# Patient Record
Sex: MALE | Race: OTHER | HISPANIC OR LATINO | Employment: OTHER | URBAN - METROPOLITAN AREA
[De-identification: names, ages, dates, MRNs, and addresses within clinical notes are randomized per-mention and may not be internally consistent; named-entity substitution may affect disease eponyms.]

---

## 2019-10-17 ENCOUNTER — APPOINTMENT (EMERGENCY)
Dept: RADIOLOGY | Facility: HOSPITAL | Age: 74
End: 2019-10-17
Payer: COMMERCIAL

## 2019-10-17 ENCOUNTER — APPOINTMENT (EMERGENCY)
Dept: MRI IMAGING | Facility: HOSPITAL | Age: 74
End: 2019-10-17
Payer: COMMERCIAL

## 2019-10-17 ENCOUNTER — APPOINTMENT (EMERGENCY)
Dept: CT IMAGING | Facility: HOSPITAL | Age: 74
End: 2019-10-17
Payer: COMMERCIAL

## 2019-10-17 ENCOUNTER — HOSPITAL ENCOUNTER (OUTPATIENT)
Facility: HOSPITAL | Age: 74
Setting detail: OBSERVATION
Discharge: HOME HEALTH | End: 2019-10-18
Attending: EMERGENCY MEDICINE | Admitting: INTERNAL MEDICINE
Payer: COMMERCIAL

## 2019-10-17 DIAGNOSIS — F32.A DEPRESSION: ICD-10-CM

## 2019-10-17 DIAGNOSIS — R41.82 ALTERED MENTAL STATUS: Primary | ICD-10-CM

## 2019-10-17 DIAGNOSIS — F19.929 DRUG INTOXICATION (HCC): ICD-10-CM

## 2019-10-17 DIAGNOSIS — F10.20 ALCOHOLISM (HCC): ICD-10-CM

## 2019-10-17 PROBLEM — G92.8 TOXIC METABOLIC ENCEPHALOPATHY: Status: ACTIVE | Noted: 2019-10-17

## 2019-10-17 LAB
ALBUMIN SERPL BCP-MCNC: 3.6 G/DL (ref 3.5–5)
ALP SERPL-CCNC: 54 U/L (ref 46–116)
ALT SERPL W P-5'-P-CCNC: 18 U/L (ref 12–78)
AMMONIA PLAS-SCNC: <10 UMOL/L (ref 11–35)
ANION GAP SERPL CALCULATED.3IONS-SCNC: 12 MMOL/L (ref 4–13)
APTT PPP: 24 SECONDS (ref 23–37)
AST SERPL W P-5'-P-CCNC: 18 U/L (ref 5–45)
BASOPHILS # BLD AUTO: 0.03 THOUSANDS/ΜL (ref 0–0.1)
BASOPHILS NFR BLD AUTO: 1 % (ref 0–1)
BILIRUB SERPL-MCNC: 0.3 MG/DL (ref 0.2–1)
BUN SERPL-MCNC: 12 MG/DL (ref 5–25)
CALCIUM SERPL-MCNC: 8.6 MG/DL (ref 8.3–10.1)
CHLORIDE SERPL-SCNC: 105 MMOL/L (ref 100–108)
CO2 SERPL-SCNC: 25 MMOL/L (ref 21–32)
CREAT SERPL-MCNC: 0.96 MG/DL (ref 0.6–1.3)
EOSINOPHIL # BLD AUTO: 0.06 THOUSAND/ΜL (ref 0–0.61)
EOSINOPHIL NFR BLD AUTO: 1 % (ref 0–6)
ERYTHROCYTE [DISTWIDTH] IN BLOOD BY AUTOMATED COUNT: 16 % (ref 11.6–15.1)
ETHANOL SERPL-MCNC: 65 MG/DL (ref 0–3)
GFR SERPL CREATININE-BSD FRML MDRD: 78 ML/MIN/1.73SQ M
GLUCOSE SERPL-MCNC: 169 MG/DL (ref 65–140)
HCT VFR BLD AUTO: 34.8 % (ref 36.5–49.3)
HGB BLD-MCNC: 11.4 G/DL (ref 12–17)
IMM GRANULOCYTES # BLD AUTO: 0.02 THOUSAND/UL (ref 0–0.2)
IMM GRANULOCYTES NFR BLD AUTO: 0 % (ref 0–2)
INR PPP: 1.11 (ref 0.84–1.19)
LIPASE SERPL-CCNC: 45 U/L (ref 73–393)
LYMPHOCYTES # BLD AUTO: 1.31 THOUSANDS/ΜL (ref 0.6–4.47)
LYMPHOCYTES NFR BLD AUTO: 25 % (ref 14–44)
MCH RBC QN AUTO: 31.2 PG (ref 26.8–34.3)
MCHC RBC AUTO-ENTMCNC: 32.8 G/DL (ref 31.4–37.4)
MCV RBC AUTO: 95 FL (ref 82–98)
MONOCYTES # BLD AUTO: 0.47 THOUSAND/ΜL (ref 0.17–1.22)
MONOCYTES NFR BLD AUTO: 9 % (ref 4–12)
NEUTROPHILS # BLD AUTO: 3.3 THOUSANDS/ΜL (ref 1.85–7.62)
NEUTS SEG NFR BLD AUTO: 64 % (ref 43–75)
NRBC BLD AUTO-RTO: 0 /100 WBCS
PLATELET # BLD AUTO: 222 THOUSANDS/UL (ref 149–390)
PMV BLD AUTO: 9.9 FL (ref 8.9–12.7)
POTASSIUM SERPL-SCNC: 3.9 MMOL/L (ref 3.5–5.3)
PROT SERPL-MCNC: 7 G/DL (ref 6.4–8.2)
PROTHROMBIN TIME: 14.3 SECONDS (ref 11.6–14.5)
RBC # BLD AUTO: 3.65 MILLION/UL (ref 3.88–5.62)
SODIUM SERPL-SCNC: 142 MMOL/L (ref 136–145)
TROPONIN I SERPL-MCNC: <0.02 NG/ML
WBC # BLD AUTO: 5.19 THOUSAND/UL (ref 4.31–10.16)

## 2019-10-17 PROCEDURE — 71045 X-RAY EXAM CHEST 1 VIEW: CPT

## 2019-10-17 PROCEDURE — 70498 CT ANGIOGRAPHY NECK: CPT

## 2019-10-17 PROCEDURE — 93005 ELECTROCARDIOGRAM TRACING: CPT

## 2019-10-17 PROCEDURE — 82140 ASSAY OF AMMONIA: CPT | Performed by: EMERGENCY MEDICINE

## 2019-10-17 PROCEDURE — 83690 ASSAY OF LIPASE: CPT | Performed by: EMERGENCY MEDICINE

## 2019-10-17 PROCEDURE — 84484 ASSAY OF TROPONIN QUANT: CPT | Performed by: EMERGENCY MEDICINE

## 2019-10-17 PROCEDURE — 99285 EMERGENCY DEPT VISIT HI MDM: CPT

## 2019-10-17 PROCEDURE — 99291 CRITICAL CARE FIRST HOUR: CPT | Performed by: EMERGENCY MEDICINE

## 2019-10-17 PROCEDURE — 99220 PR INITIAL OBSERVATION CARE/DAY 70 MINUTES: CPT | Performed by: INTERNAL MEDICINE

## 2019-10-17 PROCEDURE — 80053 COMPREHEN METABOLIC PANEL: CPT | Performed by: EMERGENCY MEDICINE

## 2019-10-17 PROCEDURE — 82948 REAGENT STRIP/BLOOD GLUCOSE: CPT

## 2019-10-17 PROCEDURE — 80320 DRUG SCREEN QUANTALCOHOLS: CPT | Performed by: EMERGENCY MEDICINE

## 2019-10-17 PROCEDURE — 85025 COMPLETE CBC W/AUTO DIFF WBC: CPT | Performed by: EMERGENCY MEDICINE

## 2019-10-17 PROCEDURE — 96375 TX/PRO/DX INJ NEW DRUG ADDON: CPT

## 2019-10-17 PROCEDURE — 85610 PROTHROMBIN TIME: CPT | Performed by: EMERGENCY MEDICINE

## 2019-10-17 PROCEDURE — 96365 THER/PROPH/DIAG IV INF INIT: CPT

## 2019-10-17 PROCEDURE — 85730 THROMBOPLASTIN TIME PARTIAL: CPT | Performed by: EMERGENCY MEDICINE

## 2019-10-17 PROCEDURE — 70496 CT ANGIOGRAPHY HEAD: CPT

## 2019-10-17 PROCEDURE — 36415 COLL VENOUS BLD VENIPUNCTURE: CPT | Performed by: EMERGENCY MEDICINE

## 2019-10-17 PROCEDURE — 80307 DRUG TEST PRSMV CHEM ANLYZR: CPT | Performed by: EMERGENCY MEDICINE

## 2019-10-17 PROCEDURE — 70551 MRI BRAIN STEM W/O DYE: CPT

## 2019-10-17 PROCEDURE — 99292 CRITICAL CARE ADDL 30 MIN: CPT | Performed by: EMERGENCY MEDICINE

## 2019-10-17 RX ORDER — ATORVASTATIN CALCIUM 20 MG/1
20 TABLET, FILM COATED ORAL DAILY
COMMUNITY
End: 2019-11-26 | Stop reason: SDUPTHER

## 2019-10-17 RX ORDER — LISINOPRIL 20 MG/1
20 TABLET ORAL DAILY
COMMUNITY
End: 2019-11-26 | Stop reason: SDUPTHER

## 2019-10-17 RX ORDER — CLOPIDOGREL BISULFATE 75 MG/1
75 TABLET ORAL DAILY
COMMUNITY
End: 2019-10-18 | Stop reason: HOSPADM

## 2019-10-17 RX ORDER — ASPIRIN 81 MG/1
81 TABLET, CHEWABLE ORAL DAILY
Status: CANCELLED | OUTPATIENT
Start: 2019-10-18

## 2019-10-17 RX ORDER — PREGABALIN 300 MG/1
300 CAPSULE ORAL 3 TIMES DAILY
COMMUNITY

## 2019-10-17 RX ORDER — GABAPENTIN 100 MG/1
100 CAPSULE ORAL 3 TIMES DAILY
COMMUNITY
End: 2019-11-26 | Stop reason: SDUPTHER

## 2019-10-17 RX ORDER — MIRTAZAPINE 15 MG/1
15 TABLET, FILM COATED ORAL
COMMUNITY
End: 2019-11-26 | Stop reason: SDUPTHER

## 2019-10-17 RX ORDER — LORAZEPAM 2 MG/ML
1 INJECTION INTRAMUSCULAR ONCE
Status: COMPLETED | OUTPATIENT
Start: 2019-10-17 | End: 2019-10-17

## 2019-10-17 RX ADMIN — THIAMINE HYDROCHLORIDE 100 MG: 100 INJECTION, SOLUTION INTRAMUSCULAR; INTRAVENOUS at 21:10

## 2019-10-17 RX ADMIN — LORAZEPAM 1 MG: 2 INJECTION INTRAMUSCULAR; INTRAVENOUS at 21:09

## 2019-10-17 RX ADMIN — IOHEXOL 85 ML: 350 INJECTION, SOLUTION INTRAVENOUS at 20:07

## 2019-10-17 NOTE — QUICK NOTE
Stroke alert activated at [facility] by Dr Jose Davidson ([phone]) at [facility] ED  Patient is a 68year old man with CAD, DM, peripheral neuropathy, HepC treated, eTOH abuse  Last known well 1800 seen by his son at home with no complaints  Patient's daughter came back from store around , and found him not speaking, on the sofa, shaking and leaning over to the side  She thought he was having a seizure  Eyes were opening and closing, both arms were shaking  He had one seizure in the 's in the setting of ETOH use  Has stopped drinking for some time, but recently started again in setting of brother dying  Told his wife his last drink was 2 weeks ago, but wife is suspicion  At baseline, is having memory deficits, and limited independence, not going far from the house  At home, cares for himself independently  Does not drive  On exam:   /77, HR 76, afebrile  No nuchal rigidity  Exam is limited due to mental status as he is not speaking and not following commands  (Wife reports he told her his brain is frozen)  He looked at her when she was on his L  Not answering orientation questions  Mostly groaning  Appears to make attempts at speech  Eyes open, full L and R gaze  Pupils R 4mm reactive, L 2mm reactive  No obvious cranial nerve deficits, but limited participation from the patient  No drift in upper extremities  Bilateral leg drift, R>L  Not participating in coordination testing  NIHSS: 8   FS  ETOH 60  CTH images reviewed: no hemorrhage  Diffuse cortical atrophy  Chronic lacunar strokes bilateral basal ganglia and L mesopontine brainstem  No hyperdense vessel signs  CTA H&N images reviewed: bilateral carotid atherosclerosis, R>L, and intracranial atherosclerosis of the carotids at the bifurcations  No large vessel occlusion  AP:   68year old man with CAD, DM, HepC, with sudden onset change in neurological status    Exam is significant for preserved level of consciousness, and intact motor and cranial nerves, but has dysfunction in language and cognition  NIHSS is 8  No significant findings on CT or CTA H&N  Differential at this point is ETOH related mental status changes, possible withdrawal seizure, versus a shower of embolic strokes hitting multiple vascular territories, without a clear localization  Will obtain MRI brain to clarify  In meantime, will give IV thiamine, folic acid, and IV fluids  If witnessed to seize, give benzodiazepines  ADDENDUM:     MRI brain reviewed: no diffusion restriction  There is a region of T2 shine through in the L subcortical white matter adjacent to the lateral ventricle  Additional history obtained from Dr Asia Levin  Patient endorsed marijuana use this evening

## 2019-10-17 NOTE — ED PROVIDER NOTES
History  Chief Complaint   Patient presents with    Altered Mental Status     pt unable to speak, last known well around 1800     Patient is a 77-year-old male  He has a history of diabetes  He has a history of alcoholism  He has had an acute MI  He has had hepatitis C which is been treated  It sounds like he has had some alcohol related encephalopathy  Family does not feel he was drinking today  He was at his baseline at about 6:30 p m  Unk Omayra Shortly thereafter they found him altered  He will not speak  Ambulance was called  Patient was not hypoglycemic  He was brought to the emergency room a as a possible stroke  History is limited due to nonverbal patient  Patient has no history of similar episodes  There was no witnessed seizure  Prior to Admission Medications   Prescriptions Last Dose Informant Patient Reported? Taking?   atorvastatin (LIPITOR) 20 mg tablet   Yes Yes   Sig: Take 20 mg by mouth daily   clopidogrel (PLAVIX) 75 mg tablet   Yes Yes   Sig: Take 75 mg by mouth daily   gabapentin (NEURONTIN) 100 mg capsule   Yes Yes   Sig: Take 100 mg by mouth 3 (three) times a day   lisinopril (ZESTRIL) 20 mg tablet   Yes Yes   Sig: Take 20 mg by mouth daily   mirtazapine (REMERON) 15 mg tablet   Yes Yes   Sig: Take 15 mg by mouth daily at bedtime   pregabalin (LYRICA) 300 MG capsule   Yes Yes   Sig: Take 300 mg by mouth 3 (three) times a day   sitaGLIPtin-metFORMIN (JANUMET)  MG per tablet   Yes Yes   Sig: Take 1 tablet by mouth 2 (two) times a day with meals      Facility-Administered Medications: None       Past Medical History:   Diagnosis Date    [facility]     Hepatitis C     Myocardial infarction ([facility] )        No past surgical history on file  No family history on file  I have reviewed and agree with the history as documented      Social History     Tobacco Use    Smoking status: Not on file   Substance Use Topics    Alcohol use: Not on file    Drug use: Not on file        Review of Systems   Unable to perform ROS: Patient nonverbal       Physical Exam  Physical Exam   Constitutional: He appears well-developed and well-nourished  HENT:   Head: Normocephalic and atraumatic  Eyes: Pupils are equal, round, and reactive to light  No scleral icterus  Neck: Normal range of motion  Neck supple  Cardiovascular: Normal rate, regular rhythm, normal heart sounds and intact distal pulses  Exam reveals no gallop and no friction rub  No murmur heard  Pulmonary/Chest: Effort normal  No stridor  No respiratory distress  He has no wheezes  He has rales  Crackles present in the bases  Abdominal: Soft  Bowel sounds are normal  He exhibits no distension  There is no tenderness  There is no guarding  Musculoskeletal: Normal range of motion  He exhibits no edema or tenderness  Neurological:   Patient is awake  There is no facial droop  Patient does not answer questions  He will follow some commands but not others  There is no weakness to the upper extremities  There does appear to be some weakness to the left lower extremity  Skin: Skin is warm and dry  Capillary refill takes less than 2 seconds  Vitals reviewed        Vital Signs  ED Triage Vitals   Temperature Pulse Respirations Blood Pressure SpO2   10/17/19 1944 10/17/19 1944 10/17/19 1944 10/1945 10/17/19 1944   97 5 °F (36 4 °C) 76 22 (!) 189/77 96 %      Temp Source Heart Rate Source Patient Position - Orthostatic VS BP Location FiO2 (%)   10/17/19 1944 10/17/19 1944 -- 10/17/19 1944 --   Oral Monitor  Right arm       Pain Score       --                  Vitals:    10/17/19 2229 10/17/19 2229 10/17/19 2234 10/17/19 2239   BP: 141/79      Pulse:  80 88 82         Visual Acuity      ED Medications  Medications   iohexol (OMNIPAQUE) 350 MG/ML injection (MULTI-DOSE) 85 mL (85 mL Intravenous Given 10/17/19 2007)   LORazepam (ATIVAN) 2 mg/mL injection 1 mg (1 mg Intravenous Given 10/17/19 2109)   thiamine (VITAMIN B1) 100 mg in sodium chloride 0 9 % 50 mL IVPB (0 mg Intravenous Stopped 10/17/19 2146)       Diagnostic Studies  Results Reviewed     Procedure Component Value Units Date/Time    Ammonia [[id number]]  (Abnormal) Collected:  10/17/19 2108    Lab Status:  Final result Specimen:  Blood from Arm, Left Updated:  10/17/19 2132     Ammonia <10 umol/L     Protime-INR [[id number]]  (Normal) Collected:  10/17/19 1954    Lab Status:  Final result Specimen:  Blood from Arm, Left Updated:  10/17/19 2023     Protime 14 3 seconds      INR 1 11    APTT [[id number]]  (Normal) Collected:  10/17/19 1954    Lab Status:  Final result Specimen:  Blood from Arm, Left Updated:  10/17/19 2023     PTT 24 seconds     Troponin I [[id number]]  (Normal) Collected:  10/17/19 1954    Lab Status:  Final result Specimen:  Blood from Arm, Left Updated:  10/17/19 2023     Troponin I <0 02 ng/mL     Ethanol [[id number]]  (Abnormal) Collected:  10/17/19 1954    Lab Status:  Final result Specimen:  Blood from Arm, Left Updated:  10/17/19 2021     Ethanol Lvl 65 mg/dL     Comprehensive metabolic panel [[id number]]  (Abnormal) Collected:  10/17/19 1954    Lab Status:  Final result Specimen:  Blood from Arm, Left Updated:  10/17/19 2019     Sodium 142 mmol/L      Potassium 3 9 mmol/L      Chloride 105 mmol/L      CO2 25 mmol/L      ANION GAP 12 mmol/L      BUN 12 mg/dL      Creatinine 0 96 mg/dL      Glucose 169 mg/dL      Calcium 8 6 mg/dL      AST 18 U/L      ALT 18 U/L      Alkaline Phosphatase 54 U/L      Total Protein 7 0 g/dL      Albumin 3 6 g/dL      Total Bilirubin 0 30 mg/dL      eGFR 78 ml/min/1 73sq m     Félix:       Damien guidelines for Chronic Kidney Disease (CKD):     Stage 1 with normal or high GFR (GFR > 90 mL/min/1 73 square meters)    Stage 2 Mild CKD (GFR = 60-89 mL/min/1 73 square meters)    Stage 3A Moderate CKD (GFR = 45-59 mL/min/1 73 square meters)    Stage 3B Moderate CKD (GFR = 30-44 mL/min/1 73 square meters)    Stage 4 Severe CKD (GFR = 15-29 mL/min/1 73 square meters)    Stage 5 End Stage CKD (GFR <15 mL/min/1 73 square meters)  Note: GFR calculation is accurate only with a steady state creatinine    Lipase [[id number]]  (Abnormal) Collected:  10/17/19 1954    Lab Status:  Final result Specimen:  Blood from Arm, Left Updated:  10/17/19 2019     Lipase 45 u/L     CBC and differential [[id number]]  (Abnormal) Collected:  10/17/19 1954    Lab Status:  Final result Specimen:  Blood from Arm, Left Updated:  10/17/19 2002     WBC 5 19 Thousand/uL      RBC 3 65 Million/uL      Hemoglobin 11 4 g/dL      Hematocrit 34 8 %      MCV 95 fL      MCH 31 2 pg      MCHC 32 8 g/dL      RDW 16 0 %      MPV 9 9 fL      Platelets 264 Thousands/uL      nRBC 0 /100 WBCs      Neutrophils Relative 64 %      Immat GRANS % 0 %      Lymphocytes Relative 25 %      Monocytes Relative 9 %      Eosinophils Relative 1 %      Basophils Relative 1 %      Neutrophils Absolute 3 30 Thousands/µL      Immature Grans Absolute 0 02 Thousand/uL      Lymphocytes Absolute 1 31 Thousands/µL      Monocytes Absolute 0 47 Thousand/µL      Eosinophils Absolute 0 06 Thousand/µL      Basophils Absolute 0 03 Thousands/µL     Rapid drug screen, urine [[id number]]     Lab Status:  No result Specimen:  Urine                  XR stroke alert portable chest   ED Interpretation by Vanessa Serrano MD (10/17 2143)   No pneumonia  Increased vascular congestion  Final Result by Caio Juarez DO (10/17 2119)      Mild pulmonary vascular congestion  No consolidation or pneumothorax  Workstation performed: [id number]         CT stroke alert brain   Final Result by Magdy Perez MD (10/17 2100)      Multiple chronic infarcts and advanced microangiopathic disease  No evidence of acute vascular territorial infarction, intracranial hemorrhage or mass effect        Findings were directly discussed with Britta Carlos on 10/17/2019 8:10 PM       Workstation performed: [id number]         CTA stroke alert (head/neck)   Final Result by Mario Perdomo MD (10/17 2100)         1  Moderate (50-69%) stenosis in the distal vertebral arteries  No hemodynamically significant stenosis, dissection or occlusion of the carotid arteries  2   No significant stenosis or occlusion of the major vessels of the [place]              Findings were directly discussed with Candis Mendosa on 10/17/2019 8:14 PM                      Workstation performed: [id number]         MRI brain wo contrast    (Results Pending)              Procedures  ECG 12 Lead Documentation Only  Date/Time: 10/17/2019 8:04 PM  Performed by: Ronda Jiménez MD  Authorized by: Ronda Jiménez MD     ECG reviewed by me, the ED Provider: yes    Patient location:  ED  Interpretation:     Interpretation: normal    Rate:     ECG rate assessment: normal    Rhythm:     Rhythm: sinus rhythm    Ectopy:     Ectopy: none    QRS:     QRS axis:  Normal  Conduction:     Conduction: normal    ST segments:     ST segments:  Normal  T waves:     T waves: normal      CriticalCare Time  Performed by: Ronda Jiménez MD  Authorized by: Ronda Jiménez MD     Critical care provider statement:     Critical care time (minutes):  90    Critical care time was exclusive of:  Separately billable procedures and treating other patients    Critical care was necessary to treat or prevent imminent or life-threatening deterioration of the following conditions:  CNS failure or compromise    Critical care was time spent personally by me on the following activities:  Obtaining history from patient or surrogate, development of treatment plan with patient or surrogate, discussions with consultants, evaluation of patient's response to treatment, examination of patient, ordering and performing treatments and interventions, ordering and review of laboratory studies, ordering and review of radiographic studies and re-evaluation of patient's condition    I assumed direction of critical care for this patient from another provider in my specialty: no             ED Course             Stroke Assessment     Row Name 10/17/19 2005             NIH Stroke Scale    Interval  Baseline      Level of Consciousness (1a )  0      LOC Questions (1b )        LOC Commands (1c )  2      Best Gaze (2 )        Visual (3 )        Facial Palsy (4 )        Motor Arm, Left (5a )  0      Motor Arm, Right (5b )  0      Motor Leg, Left (6a )  0      Motor Leg, Right (6b )  2      Limb Ataxia (7 )        Sensory (8 )        Best Language (9 )        Dysarthria (10 )        Extinction and Inattention (11 ) (Formerly Neglect)        Total                            MDM  Number of Diagnoses or Management Options  Diagnosis management comments: Patient is definitely altered  It is unclear if this is a stroke or something else  A stroke alert was called  CT of the head showed old strokes and small vessel disease  No acute stroke  CTA of the head and neck was negative for significant lesions  There was no thrombus  Chest x-ray showed increased vascular congestion  EKG was normal   Tele neurology evaluated patient  It is still unclear if this is a stroke  Unable to complete full NIH stroke scale as patient is not cooperating  Neurology recommended MRI  This is pending  They did not recommend tPA prior to MRI results  Additional history from family suggested possible seizure  This could be a postictal phase  Some of this could be from alcohol withdrawal   At this time MRI is pending and will week consult with Neurology after MRI results  MRI was negative for acute stroke  On return from MRI patient was much improved  He is now talking  He is following commands  There does not appear to be any lateralizing deficits  Patient admits to eating marijuana at a bowls today  Patient reports that he feels really high    This is likely the etiology of his symptoms  Nevertheless, will admit patient for mental status changes in for further evaluation and treatment  Consulted with hospitalist   At this time  Patient is not a tPA candidate  Amount and/or Complexity of Data Reviewed  Clinical lab tests: ordered and reviewed  Tests in the radiology section of CPT®: ordered and reviewed  Discuss the patient with other providers: yes  Independent visualization of images, tracings, or specimens: yes        Disposition  Final diagnoses: Altered mental status   Drug intoxication ([facility] )     Time reflects when diagnosis was documented in both MDM as applicable and the Disposition within this note     Time User Action Codes Description Comment    10/17/2019 10:42 PM Ganesh Hernández [R41 82] Altered mental status     10/17/2019 10:42 PM Ganesh Hernández [F94 625] Drug intoxication [facility])       ED Disposition     ED Disposition Condition Date/Time Comment    Admit Stable Thu Oct 17, 2019 10:42 PM         Follow-up Information    None         Patient's Medications   Discharge Prescriptions    No medications on file     No discharge procedures on file      ED Provider  Electronically Signed by           Melany Argueta MD  10/17/19 5841

## 2019-10-18 ENCOUNTER — APPOINTMENT (OUTPATIENT)
Dept: NEUROLOGY | Facility: HOSPITAL | Age: 74
End: 2019-10-18
Payer: COMMERCIAL

## 2019-10-18 VITALS
OXYGEN SATURATION: 99 % | HEART RATE: 67 BPM | WEIGHT: 158.73 LBS | BODY MASS INDEX: 26.45 KG/M2 | TEMPERATURE: 98.6 F | DIASTOLIC BLOOD PRESSURE: 70 MMHG | HEIGHT: 65 IN | RESPIRATION RATE: 18 BRPM | SYSTOLIC BLOOD PRESSURE: 160 MMHG

## 2019-10-18 LAB
AMPHETAMINES SERPL QL SCN: NEGATIVE
BARBITURATES UR QL: NEGATIVE
BENZODIAZ UR QL: NEGATIVE
CHOLEST SERPL-MCNC: 75 MG/DL (ref 50–200)
COCAINE UR QL: NEGATIVE
ERYTHROCYTE [DISTWIDTH] IN BLOOD BY AUTOMATED COUNT: 16.3 % (ref 11.6–15.1)
EST. AVERAGE GLUCOSE BLD GHB EST-MCNC: 151 MG/DL
FOLATE SERPL-MCNC: >20 NG/ML (ref 3.1–17.5)
GLUCOSE SERPL-MCNC: 156 MG/DL (ref 65–140)
GLUCOSE SERPL-MCNC: 159 MG/DL (ref 65–140)
GLUCOSE SERPL-MCNC: 190 MG/DL (ref 65–140)
GLUCOSE SERPL-MCNC: 235 MG/DL (ref 65–140)
HBA1C MFR BLD: 6.9 % (ref 4.2–6.3)
HCT VFR BLD AUTO: 31.4 % (ref 36.5–49.3)
HDLC SERPL-MCNC: 31 MG/DL (ref 40–60)
HGB BLD-MCNC: 10.6 G/DL (ref 12–17)
LDLC SERPL CALC-MCNC: 16 MG/DL (ref 0–100)
MCH RBC QN AUTO: 31.6 PG (ref 26.8–34.3)
MCHC RBC AUTO-ENTMCNC: 33.8 G/DL (ref 31.4–37.4)
MCV RBC AUTO: 94 FL (ref 82–98)
METHADONE UR QL: NEGATIVE
OPIATES UR QL SCN: NEGATIVE
PCP UR QL: NEGATIVE
PLATELET # BLD AUTO: 202 THOUSANDS/UL (ref 149–390)
PMV BLD AUTO: 9.7 FL (ref 8.9–12.7)
RBC # BLD AUTO: 3.35 MILLION/UL (ref 3.88–5.62)
THC UR QL: POSITIVE
TRIGL SERPL-MCNC: 141 MG/DL
WBC # BLD AUTO: 5.56 THOUSAND/UL (ref 4.31–10.16)

## 2019-10-18 PROCEDURE — 97167 OT EVAL HIGH COMPLEX 60 MIN: CPT

## 2019-10-18 PROCEDURE — G8979 MOBILITY GOAL STATUS: HCPCS

## 2019-10-18 PROCEDURE — 94762 N-INVAS EAR/PLS OXIMTRY CONT: CPT

## 2019-10-18 PROCEDURE — 95816 EEG AWAKE AND DROWSY: CPT | Performed by: PSYCHIATRY & NEUROLOGY

## 2019-10-18 PROCEDURE — G8978 MOBILITY CURRENT STATUS: HCPCS

## 2019-10-18 PROCEDURE — 84425 ASSAY OF VITAMIN B-1: CPT | Performed by: INTERNAL MEDICINE

## 2019-10-18 PROCEDURE — 82746 ASSAY OF FOLIC ACID SERUM: CPT | Performed by: INTERNAL MEDICINE

## 2019-10-18 PROCEDURE — 97163 PT EVAL HIGH COMPLEX 45 MIN: CPT

## 2019-10-18 PROCEDURE — NC001 PR NO CHARGE: Performed by: INTERNAL MEDICINE

## 2019-10-18 PROCEDURE — 95816 EEG AWAKE AND DROWSY: CPT

## 2019-10-18 PROCEDURE — 83036 HEMOGLOBIN GLYCOSYLATED A1C: CPT | Performed by: INTERNAL MEDICINE

## 2019-10-18 PROCEDURE — G8987 SELF CARE CURRENT STATUS: HCPCS

## 2019-10-18 PROCEDURE — 82948 REAGENT STRIP/BLOOD GLUCOSE: CPT

## 2019-10-18 PROCEDURE — 80061 LIPID PANEL: CPT | Performed by: INTERNAL MEDICINE

## 2019-10-18 PROCEDURE — 85027 COMPLETE CBC AUTOMATED: CPT | Performed by: INTERNAL MEDICINE

## 2019-10-18 PROCEDURE — 99217 PR OBSERVATION CARE DISCHARGE MANAGEMENT: CPT | Performed by: INTERNAL MEDICINE

## 2019-10-18 PROCEDURE — G8988 SELF CARE GOAL STATUS: HCPCS

## 2019-10-18 PROCEDURE — 99204 OFFICE O/P NEW MOD 45 MIN: CPT | Performed by: PSYCHIATRY & NEUROLOGY

## 2019-10-18 RX ORDER — FOLIC ACID 1 MG/1
1 TABLET ORAL DAILY
Qty: 30 TABLET | Refills: 0 | Status: SHIPPED | OUTPATIENT
Start: 2019-10-19 | End: 2019-11-25 | Stop reason: SDUPTHER

## 2019-10-18 RX ORDER — FERROUS SULFATE 325(65) MG
325 TABLET ORAL
COMMUNITY

## 2019-10-18 RX ORDER — FOLIC ACID 1 MG/1
1 TABLET ORAL DAILY
Status: DISCONTINUED | OUTPATIENT
Start: 2019-10-18 | End: 2019-10-18 | Stop reason: HOSPADM

## 2019-10-18 RX ORDER — PREGABALIN 100 MG/1
300 CAPSULE ORAL 3 TIMES DAILY
Status: DISCONTINUED | OUTPATIENT
Start: 2019-10-18 | End: 2019-10-18

## 2019-10-18 RX ORDER — CLOPIDOGREL BISULFATE 75 MG/1
75 TABLET ORAL DAILY
Status: DISCONTINUED | OUTPATIENT
Start: 2019-10-18 | End: 2019-10-18 | Stop reason: HOSPADM

## 2019-10-18 RX ORDER — ASPIRIN 81 MG/1
81 TABLET ORAL DAILY
Status: DISCONTINUED | OUTPATIENT
Start: 2019-10-18 | End: 2019-10-18 | Stop reason: HOSPADM

## 2019-10-18 RX ORDER — LANOLIN ALCOHOL/MO/W.PET/CERES
100 CREAM (GRAM) TOPICAL DAILY
Qty: 30 TABLET | Refills: 0 | Status: SHIPPED | OUTPATIENT
Start: 2019-10-19 | End: 2019-11-25 | Stop reason: SDUPTHER

## 2019-10-18 RX ORDER — GABAPENTIN 100 MG/1
100 CAPSULE ORAL 3 TIMES DAILY
Status: DISCONTINUED | OUTPATIENT
Start: 2019-10-18 | End: 2019-10-18 | Stop reason: HOSPADM

## 2019-10-18 RX ORDER — ASPIRIN 81 MG/1
81 TABLET, CHEWABLE ORAL DAILY
COMMUNITY

## 2019-10-18 RX ORDER — INSULIN GLARGINE 100 [IU]/ML
10 INJECTION, SOLUTION SUBCUTANEOUS
Status: DISCONTINUED | OUTPATIENT
Start: 2019-10-18 | End: 2019-10-18 | Stop reason: HOSPADM

## 2019-10-18 RX ORDER — ATORVASTATIN CALCIUM 20 MG/1
20 TABLET, FILM COATED ORAL DAILY
Status: DISCONTINUED | OUTPATIENT
Start: 2019-10-18 | End: 2019-10-18 | Stop reason: HOSPADM

## 2019-10-18 RX ORDER — THIAMINE MONONITRATE (VIT B1) 100 MG
100 TABLET ORAL DAILY
Status: DISCONTINUED | OUTPATIENT
Start: 2019-10-18 | End: 2019-10-18 | Stop reason: HOSPADM

## 2019-10-18 RX ORDER — CLOPIDOGREL BISULFATE 75 MG/1
75 TABLET ORAL DAILY
Qty: 30 TABLET | Refills: 0 | Status: SHIPPED | OUTPATIENT
Start: 2019-10-18 | End: 2019-11-25 | Stop reason: SDUPTHER

## 2019-10-18 RX ORDER — MIRTAZAPINE 15 MG/1
15 TABLET, FILM COATED ORAL
Status: DISCONTINUED | OUTPATIENT
Start: 2019-10-18 | End: 2019-10-18 | Stop reason: HOSPADM

## 2019-10-18 RX ORDER — LISINOPRIL 20 MG/1
20 TABLET ORAL DAILY
Status: DISCONTINUED | OUTPATIENT
Start: 2019-10-18 | End: 2019-10-18 | Stop reason: HOSPADM

## 2019-10-18 RX ADMIN — GABAPENTIN 100 MG: 100 CAPSULE ORAL at 11:07

## 2019-10-18 RX ADMIN — ENOXAPARIN SODIUM 40 MG: 40 INJECTION SUBCUTANEOUS at 11:06

## 2019-10-18 RX ADMIN — THIAMINE HCL TAB 100 MG 100 MG: 100 TAB at 11:06

## 2019-10-18 RX ADMIN — GABAPENTIN 100 MG: 100 CAPSULE ORAL at 18:29

## 2019-10-18 RX ADMIN — LISINOPRIL 20 MG: 20 TABLET ORAL at 11:07

## 2019-10-18 RX ADMIN — INSULIN LISPRO 4 UNITS: 100 INJECTION, SOLUTION INTRAVENOUS; SUBCUTANEOUS at 11:09

## 2019-10-18 RX ADMIN — CLOPIDOGREL BISULFATE 75 MG: 75 TABLET ORAL at 11:07

## 2019-10-18 RX ADMIN — ATORVASTATIN CALCIUM 20 MG: 20 TABLET, FILM COATED ORAL at 18:29

## 2019-10-18 RX ADMIN — ASPIRIN 81 MG: 81 TABLET, COATED ORAL at 18:29

## 2019-10-18 RX ADMIN — FOLIC ACID 1 MG: 1 TABLET ORAL at 11:06

## 2019-10-18 RX ADMIN — INSULIN GLARGINE 10 UNITS: 100 INJECTION, SOLUTION SUBCUTANEOUS at 01:41

## 2019-10-18 NOTE — DISCHARGE SUMMARY
Discharge Summary - [facility]    Patient Information: Madelyn Zuluaga 68 y o  male MRN: [id number]  Unit/Bed#: -01 Encounter: [id number]    Discharging Physician / Practitioner: Farhat Lindquist MD  PCP: No primary care provider on file  Admission Date: 10/17/2019  Discharge Date: 10/18/19    Disposition:     Home with [facility]NA Services (Reminder: Complete face to face encounter)     Reason for Admission:    Discharge Diagnoses:     Principal Problem:    Toxic metabolic encephalopathy  Active Problems:    Type 2 diabetes mellitus with diabetic polyneuropathy, without long-term current use of insulin ([facility])    Alcoholism ([facility] )    Coronary artery disease involving native coronary artery of native heart without angina pectoris    Essential hypertension    Depression    Altered mental status  Resolved Problems:    * No resolved hospital problems  *      Consultations During Hospital Stay:  Neurology    Procedures Performed:     · EEG normal    Significant Findings / Test Results:   · MRI of the brain  IMPRESSION:     Small focal area of restricted diffusion in the left centrum semiovale white matter just above the posterior left insula (axial image 18, series 3) suspicious for acute/subacute infarct  No territorial infarct is identified      Old lacunar infarcts, chronic appearing white matter changes, and other findings as above  Incidental Findings:   · None    Test Results Pending at Discharge (will require follow up): · None     Outpatient Tests Requested:  · None    Complications:  None    Hospital Course:     Madelyn Zuluaga is a 68 y o  male patient with cad, diabetes,  hep c, alcohol abuse who originally presented to the hospital on 10/17/2019 due to questionable seizure activity and slurred speech  He was initially placed on stroke path way but later dc as MRI was negative for acute stroke, the findings with restricted diffusion in left centrum ovale were believed sec to artifact  EEG was normal   His sx were believed sec to alcohol/ marijuana use  Neuro recommended dual antiplatelets for 3 weeks and then plavix    Condition at Discharge: stable     Discharge Day Visit / Exam:     * Please refer to separate progress note for these details *    Discussion with Family:  Patient      Discharge instructions/Information to patient and family:   See after visit summary for information provided to patient and family  Provisions for Follow-Up Care:  See after visit summary for information related to follow-up care and any pertinent home health orders  Planned Readmission:  None    Discharge Statement:  I spent32 minutes discharging the patient  This time was spent on the day of discharge  I had direct contact with the patient on the day of discharge  Greater than 50% of the total time was spent examining patient, answering all patient questions, arranging and discussing plan of care with patient as well as directly providing post-discharge instructions  Additional time then spent on discharge activities  Discharge Medications:  See after visit summary for reconciled discharge medications provided to patient and family  ** Please Note: This note has been constructed using a voice recognition system   **

## 2019-10-18 NOTE — ASSESSMENT & PLAN NOTE
Patient initially stated he had a drink for 2 weeks, but then admitted to drinking recently due to the death of his brother  He had alcohol in his system on admission  Suspect that alcohol ingestion along with marijuana ingestion are the culprit of his current encephalopathy  · CIWA 3 on admission  · Start on CIWA protocol  · Currently not in withdrawal at this point  · Consult case management   on abstinence    · Continue thiamine and folate

## 2019-10-18 NOTE — H&P
H&P- Beather Lanes 1945, 68 y o  male MRN: [id number]    Unit/Bed#: ED 28 Encounter: [id number]    Primary Care Provider: No primary care provider on file  Date and time admitted to hospital: 10/17/2019  7:42 PM        Alcoholism [facility])  Assessment & Plan  Patient initially stated he had a drink for 2 weeks, but then admitted to drinking recently due to the death of his brother  He had alcohol in his system on admission  Suspect that alcohol ingestion along with marijuana ingestion are the culprit of his current encephalopathy  · CIWA 3 on admission  · Start on CIWA protocol  · Currently not in withdrawal at this point  · Consult case management   on abstinence  · Continue thiamine and folate    * Toxic metabolic encephalopathy  Assessment & Plan  CT and MRI both obtained in the ED  MRI demonstrated old lacunar infarcts, small focal area of restricted diffusion and left centrum semiovale, possibly representing a subacute/acute infarct  Case reviewed by Neurology, who at this point do not suspect acute CVA  At this time, my suspicion is that patient's altered mental status is actually secondary to ingestion of both marijuana and alcohol  Neuro deficits:  No focal deficits  Patient oriented to person and place, but not time     Continue neuro checks   Consult neurology   Continue aspirin, statin   Recommend counseling on substance abuse given likely culprit of his encephalopathy   CIWA protocol in case of alcohol withdrawal, although no evidence of withdrawal at this point   Give benzos if concern for seizure      Coronary artery disease involving native coronary artery of native heart without angina pectoris  Assessment & Plan  · Continue statin  · Continue Plavix    Depression  Assessment & Plan  · Continue mirtazapine    Essential hypertension  Assessment & Plan  · Continue lisinopril daily    Type 2 diabetes mellitus with diabetic polyneuropathy, without long-term current use of insulin ([facility] )  Assessment & Plan  No results found for: HGBA1C    No results for input(s): POCGLU in the last 72 hours  Blood Sugar Average: Last 72 hrs:    · Continue Lyrica t i d   · Will start on basal insulin and mealtime insulin  · Lantus 10 units at bedtime  · Humalog 4 units t i d  With meals    EDIT: will continue gabapentin instead of lyrica TID    History and Physical - [facility]    Patient Information: Caryle Garb 68 y o  male MRN: [id number]  Unit/Bed#: ED 29 Encounter: [id number]  Admitting Physician: Osei Rodriguez DO  PCP: No primary care provider on file  Date of Admission:  10/18/19      VTE Prophylaxis: Enoxaparin (Lovenox)  / reason for no mechanical VTE prophylaxis mod risk dvt   Code Status: FULL CODE  POLST: POLST form is not discussed and not completed at this time  Anticipated Length of Stay:  Patient will be admitted on an Observation basis with an anticipated length of stay of < 2 midnights  Justification for Hospital Stay: Please see detailed plans noted above  Total Time for Visit, including Counseling / Coordination of Care: 1 hour  Greater than 50% of this total time spent on direct patient counseling and coordination of care  Chief Complaint:  Altered mental status    History of Present Illness:    Caryle Garb is a 68 y o  male who presents with altered mental status  He has known history of diabetes with peripheral neuropathy, alcoholism, remote seizure in the 70s likely from alcohol use, and hepatitis-C which was treated  Patient is a poor historian on exam   History mainly obtained from family and from chart review  His wife and son are both present at bedside  Patient was at his baseline at around 6:30 p m  earlier today  Shortly thereafter, he was found by his daughter unable to speak  He was on the sofa, shaking, and leading over to the side  There was concern for seizure  Ambulance was called    He was not hypoglycemic at this time   There was initial concern for stroke  He was evaluated by ED staff and by Neurology  There were no significant findings on CT or CTA of the head and neck  MRI of the brain revealed left subcortical white matter showing through  There is lacunar infarct, although questionable whether acute versus subacute  He was given thiamine and fluid in the ED  On exam, he was a poor historian, but no focal neuro deficits  Initial NIH SS was 8  Concern then shifted from acute CVA to a toxic metabolic encephalopathy  Patient has history of alcoholism and initially said he had a drink for 2 weeks, but there is alcohol in his system  In addition, it was found that he had been ingesting marijuana at home  He lives with his daughter  Patient's mental status improved over the course of his stay in the ED  He was admitted for observation for continued neurological monitoring  On exam, he is still poor historian, but mental status is improved  He has no acute distress  He has poor recollection of the event earlier today and does not relate any events from earlier, only stating that he remembers being angry    He is oriented to person and place, but not time  Review of Systems:    Review of Systems   Unable to perform ROS: Mental status change   Respiratory: Negative for chest tightness and shortness of breath  Cardiovascular: Negative for chest pain  Past Medical and Surgical History:     Past Medical History:   Diagnosis Date    [facility] 3/15/2013    Diabetes mellitus [facility]     Hepatitis C     Myocardial infarction ([facility] )        No past surgical history on file  Meds/Allergies:    Prior to Admission medications    Medication Sig Start Date End Date Taking?  Authorizing Provider   atorvastatin (LIPITOR) 20 mg tablet Take 20 mg by mouth daily   Yes Historical Provider, MD   clopidogrel (PLAVIX) 75 mg tablet Take 75 mg by mouth daily   Yes Historical Provider, MD   gabapentin (NEURONTIN) 100 mg capsule Take 100 mg by mouth 3 (three) times a day   Yes Historical Provider, MD   lisinopril (ZESTRIL) 20 mg tablet Take 20 mg by mouth daily   Yes Historical Provider, MD   mirtazapine (REMERON) 15 mg tablet Take 15 mg by mouth daily at bedtime   Yes Historical Provider, MD   pregabalin (LYRICA) 300 MG capsule Take 300 mg by mouth 3 (three) times a day   Yes Historical Provider, MD   sitaGLIPtin-metFORMIN (JANUMET)  MG per tablet Take 1 tablet by mouth 2 (two) times a day with meals   Yes Historical Provider, MD     I have reviewed home medications with patient family member  Allergies: No Known Allergies    Social History:     Marital Status: /Civil Union   Occupation: None  Patient Pre-hospital Living Situation: With daughter  Patient Pre-hospital Level of Mobility: Uses cane  Patient Pre-hospital Diet Restrictions: diabetic  Substance Use History:   Social History     Substance and Sexual Activity   Alcohol Use Not on file     Social History     Tobacco Use   Smoking Status Not on file     Social History     Substance and Sexual Activity   Drug Use Not on file       Family History:    non-contributory    Physical Exam:     Vitals:   Blood Pressure: 141/79 (10/17/19 2229)  Pulse: 82 (10/17/19 2239)  Temperature: 97 5 °F (36 4 °C) (10/17/19 1944)  Temp Source: Oral (10/17/19 1944)  Respirations: 19 (10/17/19 2239)  Weight - Scale: 72 kg (158 lb 11 7 oz) (10/17/19 1948)  SpO2: 100 % (10/17/19 2139)    Physical Exam   Constitutional: He appears well-developed and well-nourished  No distress  HENT:   Head: Normocephalic and atraumatic  Nose: Nose normal    Mouth/Throat: No oropharyngeal exudate  Eyes: Pupils are equal, round, and reactive to light  Conjunctivae and EOM are normal    Neck: Normal range of motion  Neck supple  Cardiovascular: Normal rate, regular rhythm, normal heart sounds and intact distal pulses     Pulmonary/Chest: Effort normal and breath sounds normal  Abdominal: Soft  He exhibits no distension  There is no tenderness  Musculoskeletal: He exhibits no edema, tenderness or deformity  Neurological: He is alert  He has normal strength  He is disoriented  No cranial nerve deficit or sensory deficit  Skin: Skin is warm and dry  No rash noted  He is not diaphoretic  Psychiatric: His speech is slurred  Vitals reviewed  Additional Data:     Lab Results: I have personally reviewed pertinent reports  Results from last 7 days   Lab Units 10/17/19  1954   WBC Thousand/uL 5 19   HEMOGLOBIN g/dL 11 4*   HEMATOCRIT % 34 8*   PLATELETS Thousands/uL 222   NEUTROS PCT % 64   LYMPHS PCT % 25   MONOS PCT % 9   EOS PCT % 1     Results from last 7 days   Lab Units 10/17/19  1954   POTASSIUM mmol/L 3 9   CHLORIDE mmol/L 105   CO2 mmol/L 25   BUN mg/dL 12   CREATININE mg/dL 0 96   CALCIUM mg/dL 8 6   ALK PHOS U/L 54   ALT U/L 18   AST U/L 18     Results from last 7 days   Lab Units 10/17/19  1954   INR  1 11       Imaging: I have personally reviewed pertinent reports  and I have personally reviewed pertinent films in PACS    Mri Brain Wo Contrast    Result Date: 10/17/2019  Narrative: MRI BRAIN WITHOUT CONTRAST INDICATION: stroke  COMPARISON:   CT head and CTA head and neck same day TECHNIQUE:  Sagittal T1, axial T2, axial FLAIR, axial T1, axial [place] and axial diffusion imaging  IMAGE QUALITY:  Diagnostic  FINDINGS: BRAIN PARENCHYMA:  Focal and confluent hyperintensities on T2/FLAIR imaging are noted in the periventricular and subcortical white matter demonstrating an appearance that is statistically most likely to represent moderate microangiopathic change  Superimposed on this, there is a small area of restricted diffusion in the left centrum semiovale white matter just above the posterior left insula (axial image 18, series 3) suspicious for focal acute/subacute infarct  No territorial infarct is identified   There is no discrete mass, mass effect or midline shift  There is no intracranial hemorrhage  Mineralization of bilateral basal ganglion noted  There is generalized parenchymal atrophy  Small lacunar infarcts are redemonstrated in the left corona radiata white matter, the moise, the right insular region, and the right thalamus  There is a focal  area of encephalomalacia in the posterior right parietal region, probably related to chronic ischemia  VENTRICLES:  No hydrocephalus  SELLA AND PITUITARY GLAND:  Normal  ORBITS:  Normal  PARANASAL SINUSES:  Grossly clear VASCULATURE:  Evaluation of the major intracranial vasculature demonstrates appropriate flow voids, these are better evaluated on the CT head of the head and neck, recently performed  CALVARIUM AND SKULL BASE:  Normal  EXTRACRANIAL SOFT TISSUES:  Unremarkable  Impression: Small focal area of restricted diffusion in the left centrum semiovale white matter just above the posterior left insula (axial image 18, series 3) suspicious for acute/subacute infarct  No territorial infarct is identified  Old lacunar infarcts, chronic appearing white matter changes, and other findings as above  Findings discussed with Dr Camilla Elias by Dr Esa Poole at 10:50 PM on 10/17/2019  Workstation performed: [id number]     Xr Stroke Alert Portable Chest    Result Date: 10/17/2019  Narrative: CHEST INDICATION:   cva  COMPARISON:  None EXAM PERFORMED/VIEWS:  XR STROKE ALERT PORTABLE CHEST FINDINGS: Cardiomediastinal silhouette appears unremarkable  There is mild pulmonary vascular congestion  No consolidation or pneumothorax  Osseous structures appear within normal limits for patient age  Impression: Mild pulmonary vascular congestion  No consolidation or pneumothorax  Workstation performed: [id number]     Ct Stroke Alert Brain    Result Date: 10/17/2019  Narrative: CT BRAIN - STROKE ALERT PROTOCOL INDICATION:   Altered mental status  COMPARISON:  None  TECHNIQUE:  CT examination of the brain was performed    In addition to axial images, coronal reformatted images were created and submitted for interpretation  Radiation dose length product (DLP) for this visit:  744 mGy-cm   This examination, like all CT scans performed in the [facility], was performed utilizing techniques to minimize radiation dose exposure, including the use of iterative reconstruction and automated exposure control  IMAGE QUALITY:  Diagnostic  FINDINGS:  PARENCHYMA:  Small area of encephalomalacia and gliosis in the right inferior parietal lobe consistent with a chronic infarct  Small, chronic left parietal infarct  Chronic lacunar infarcts in the left corona radiata, right thalamus and right subinsular white matter  Chronic lacunar infarcts in the left midbrain and right aspect of the moise  Periventricular and subcortical hypoattenuating foci consistent with microangiopathic disease  No acute intracranial hemorrhage or mass effect VENTRICLES AND EXTRA-AXIAL SPACES:  Prominence of the ventricles and sulci consistent with volume loss  No hydrocephalus or extra-axial collection  VISUALIZED ORBITS AND PARANASAL SINUSES:  Intact globes and orbits  No paranasal sinus disease  CALVARIUM AND EXTRACRANIAL SOFT TISSUES:  No lytic or blastic lesion or soft tissue mass  Impression: Multiple chronic infarcts and advanced microangiopathic disease  No evidence of acute vascular territorial infarction, intracranial hemorrhage or mass effect  Findings were directly discussed with Kirill Glez on 10/17/2019 8:10 PM  Workstation performed: [id number]     Cta Stroke Alert (head/neck)    Result Date: 10/17/2019  Narrative: CTA NECK AND BRAIN WITH CONTRAST INDICATION: cva COMPARISON:   None  TECHNIQUE:   Post contrast imaging was performed after administration of iodinated contrast through the neck and brain  Post contrast axial 0 625 mm images timed to opacify the arterial system  3D rendering was performed on an independent workstation     MIP reconstructions performed  Coronal reconstructions were performed of the noncontrast portion of the brain  Radiation dose length product (DLP) for this visit:  372 mGy-cm   This examination, like all CT scans performed in the [facility], was performed utilizing techniques to minimize radiation dose exposure, including the use of iterative reconstruction and automated exposure control  IV Contrast:  85 mL of iohexol (OMNIPAQUE)  IMAGE QUALITY:   Diagnostic FINDINGS: CERVICAL VASCULATURE AORTIC ARCH AND GREAT VESSELS:  Three-vessel configuration aortic arch  No stenosis in the subclavian arteries  RIGHT VERTEBRAL ARTERY CERVICAL SEGMENT:  Mild (less than 50%) narrowing at the origin  The vessel is normal in caliber throughout the neck  LEFT VERTEBRAL ARTERY CERVICAL SEGMENT:  Normal origin  The vessel is normal in caliber throughout the neck  RIGHT EXTRACRANIAL CAROTID SEGMENT:  Normal caliber common carotid artery  Calcified plaque the bifurcation and internal carotid artery origin without significant stenosis  LEFT EXTRACRANIAL CAROTID SEGMENT:  Normal caliber common carotid artery  Minimal plaque at the bifurcation and internal carotid artery origin without stenosis  NASCET criteria was used to determine the degree of internal carotid artery diameter stenosis  INTRACRANIAL VASCULATURE INTERNAL CAROTID ARTERIES:  Calcified plaque throughout the carotid siphons resulting in mild (less than 50%) stenosis  Patent ophthalmic arteries  ANTERIOR CIRCULATION:  Symmetric A1 segments and anterior cerebral arteries with normal enhancement  Normal anterior communicating artery  MIDDLE CEREBRAL ARTERY CIRCULATION:  M1 segment and middle cerebral artery branches demonstrate normal enhancement bilaterally   DISTAL VERTEBRAL ARTERIES:  Multifocal calcified plaque along the intradural segment of the right vertebral artery and in the distal aspect of the left vertebral artery, resulting in moderate (50-69%) narrowing  Posterior inferior cerebellar arteries are patent  BASILAR ARTERY:  Basilar artery is normal in caliber  Normal superior cerebellar arteries  POSTERIOR CEREBRAL ARTERIES: No stenosis in the posterior cerebral arteries  Posterior to indicating arteries not visualized  DURAL VENOUS SINUSES:  Patent  NON VASCULAR ANATOMY BONY STRUCTURES:  No lytic or blastic lesion  SOFT TISSUES OF THE NECK:  No soft tissue mass or lymphadenopathy  THORACIC INLET:  Minimal paraseptal emphysema in the lung apices  Impression: 1  Moderate (50-69%) stenosis in the distal vertebral arteries  No hemodynamically significant stenosis, dissection or occlusion of the carotid arteries  2   No significant stenosis or occlusion of the major vessels of the [place]  Findings were directly discussed with Olivia Alonzo on 10/17/2019 8:14 PM  Workstation performed: [id number]       EKG, Pathology, and Other Studies Reviewed on Admission:   · EKG: NSR    Allscripts / Epic Records Reviewed: Yes     Portions of the record may have been created with voice recognition software  Occasional wrong word or "sound a like" substitutions may have occurred due to the inherent limitations of voice recognition software  Read the chart carefully and recognize, using context, where substitutions have occurred

## 2019-10-18 NOTE — ASSESSMENT & PLAN NOTE
Patient initially stated he had a drink for 2 weeks, but then admitted to drinking recently due to the death of his brother  He had alcohol in his system on admission  Suspect that alcohol ingestion along with marijuana ingestion are the culprit of his current encephalopathy      Continue CIWA

## 2019-10-18 NOTE — ASSESSMENT & PLAN NOTE
CT and MRI both obtained in the ED  MRI demonstrated old lacunar infarcts, small focal area of restricted diffusion and left centrum semiovale, possibly representing a subacute/acute infarct  Case reviewed by Neurology, who at this point do not suspect acute CVA  At this time, my suspicion is that patient's altered mental status is actually secondary to ingestion of both marijuana and alcohol  Neuro deficits:  No focal deficits  Patient oriented to person and place, but not time     Neurology recommendations appreciated   Continue [facility] protocol   EEG ordered and pending

## 2019-10-18 NOTE — ASSESSMENT & PLAN NOTE
No results found for: HGBA1C    No results for input(s): POCGLU in the last 72 hours      Blood Sugar Average: Last 72 hrs:  Blood sugars stable continue current region

## 2019-10-18 NOTE — UTILIZATION REVIEW
Initial Clinical Review    Admission: Date/Time/Statement: observation 10/17/19 @2243  Orders Placed This Encounter   Procedures    Place in Observation (expected length of stay for this patient is less than two midnights)     Standing Status:   Standing     Number of Occurrences:   1     Order Specific Question:   Admitting Physician     Answer:   Jimmy Blanton [[id number]]     Order Specific Question:   Level of Care     Answer:   Med Surg [[id number]]     ED Arrival Information     Expected Arrival Acuity Means of Arrival Escorted By Service Admission Type    - 10/17/2019 19:42 Emergent Ambulance [address] Emergency    Arrival Complaint    -        Chief Complaint   Patient presents with    Altered Mental Status     pt unable to speak, last known well around 1800     Assessment/Plan: 68year old male to the ED via EMS with change in mental status, non-verbal   Admitted under observation for toxic metabolic encephalopathy, alcoholism  H/O alcoholism, diabetes, MI, hep c was at baseline and verbal until 1 5 hours prior to his arrival to the ED  Family does not think he was drinking  Upon arrival to the ED, he is nonverbal, awake, follows commands intermittently appears to have some weakness to left lower extremity  Seen by neuro in ED:  Not answering orientation questions  Mostly groaning  Appears to make attempts at speech  Eyes open, full L and R gaze  NIHSS 8  ETOH 60  Start on CIWA, not appearing to be in withdrawal   Altered mental status, encephalopathy likely due to alcohol and marijuana ingestion  Continue with neuro checks  Check MRI  IV fluids with thiamin and folic acid  PRN benzos for seizure activity     ED Triage Vitals   Temperature Pulse Respirations Blood Pressure SpO2   10/17/19 1944 10/17/19 1944 10/17/19 1944 10/1945 10/17/19 1944   97 5 °F (36 4 °C) 76 22 (!) 189/77 96 %      Temp Source Heart Rate Source Patient Position - Orthostatic VS BP Location FiO2 (%) 10/17/19 1944 10/17/19 1944 10/18/19 0046 10/17/19 1944 --   Oral Monitor Lying Right arm       Pain Score       10/18/19 0046       No Pain        Wt Readings from Last 1 Encounters:   10/17/19 72 kg (158 lb 11 7 oz)     Additional Vital Signs:  Date/Time Temp Pulse Resp BP MAP (mmHg) SpO2 O2 Device Patient Position - Orthostatic VS   10/18/19 0800 98 3 °F (36 8 °C) 70 18 165/72 103 98 % Nasal cannula Lying   10/18/19 0734      99 % Nasal cannula    10/18/19 0300 97 6 °F (36 4 °C) 74 17 145/65  100 %  Lying   10/18/19 0200 97 8 °F (36 6 °C) 79 16 140/62  100 % Nasal cannula Lying   10/18/19 0100 97 7 °F (36 5 °C) 81 16 142/64    Lying   10/18/19 0046  79 16 142/63  95 % Nasal cannula Lying   10/17/19 2110  86 19 182/79Abnormal  114 98 %     10/17/19 2108  78 24Abnormal    98 %     10/17/19 2107  78 12   99 %     10/17/19 2106  82 15   99 %     10/17/19 2104  82 14   99 %     10/17/19 2103  80 13   98 %     10/17/19 2102  82 13   98 %     10/17/19 2100  82 15 184/79Abnormal  114 98 %     CIWA:  CIWA-Ar Score     Row Name    10/18/19  0400     BP         Pulse         Nausea and Vomiting    0     Tactile Disturbances    0     Tremor    0     Auditory Disturbances    0     Paroxysmal Sweats    0     Visual Disturbances    0     Anxiety    0     Headache, Fullness in Head    0     Agitation    0     Orientation and Clouding of Sensorium    0     CIWA-Ar Total    0     Neurological/Vitals     Row Name   10/18/19  0600  10/18/19  0400    Level of Consciousness   Alert/awake  Alert/awake    Orientation Level   Oriented X4  Oriented X4    Cognition   Appropriate for  developmental  age  Appropriate for  developmental  age    Extraocular Movements   Full  Full    Right Upper Visual Fields   Intact  Intact    Left Upper Visual Fields   Intact  Intact    Right Lower Visual Fields   Intact  Intact    Left Lower Visual Fields   Intact  Intact    Speech   Clear  Clear    Facial Symmetry   Symmetrical  Symmetrical    Tongue Deviation   Midline  Midline    R Pupil Size (mm)   2  2    R Pupil Reaction   Brisk  Brisk    L Pupil Size (mm)   2  2    L Pupil Reaction   Brisk  Brisk    R Hand    Strong  Strong    L Hand    Strong  Strong    RUE Muscle Strength   5- Normal stre-  ngth  5- Normal stre-  ngth    LUE Muscle Strength   5- Normal stre-  ngth  5- Normal stre-  ngth    RLE Muscle Strength   5- Normal stre-  ngth  5- Normal stre-  ngth    LLE Muscle Strength   5- Normal stre-  ngth  5- Normal stre-  ngth    RUE Sensation   Full sensation  Full sensation    LUE Sensation   Full sensation  Full sensation    RLE Sensation   Full sensation  Full sensation    LLE Sensation   Full sensation  Full sensation    Eye Opening   4  4    Best Verbal Response   5  5    Best Motor Response   6  6    [place] Coma Scale Score   15  15      Pertinent Labs/Diagnostic Test Results:   MRI brain 10/17: Small focal area of restricted diffusion in the left centrum semiovale white matter just above the posterior left insula (axial image 18, series 3) suspicious for acute/subacute infarct   No territorial infarct is identified  Old lacunar infarcts, chronic appearing white matter changes, and other findings as above  CXR 10/17:  Mild pulmonary vascular congestion   No consolidation or pneumothorax  CT stroke alert brain 10/17:  Multiple chronic infarcts and advanced microangiopathic disease   No evidence of acute vascular territorial infarction, intracranial hemorrhage or mass effect  cTA stroke alert head/neck 10/17: Moderate (50-69%) stenosis in the distal vertebral arteries   No hemodynamically significant stenosis, dissection or occlusion of the carotid arteries    No significant stenosis or occlusion of the major vessels of the [place]  EKG:   Interpretation:     Interpretation: normal    Rate:     ECG rate assessment: normal    Rhythm:     Rhythm: sinus rhythm    Ectopy:     Ectopy: none QRS:     QRS axis:  Normal  Conduction:     Conduction: normal    ST segments:     ST segments:  Normal  T waves:     T waves: normal    Results from last 7 days   Lab Units 10/18/19  0549 10/17/19  1954   WBC Thousand/uL 5 56 5 19   HEMOGLOBIN g/dL 10 6* 11 4*   HEMATOCRIT % 31 4* 34 8*   PLATELETS Thousands/uL 202 222   NEUTROS ABS Thousands/µL  --  3 30         Results from last 7 days   Lab Units 10/17/19  1954   SODIUM mmol/L 142   POTASSIUM mmol/L 3 9   CHLORIDE mmol/L 105   CO2 mmol/L 25   ANION GAP mmol/L 12   BUN mg/dL 12   CREATININE mg/dL 0 96   EGFR ml/min/1 73sq m 78   CALCIUM mg/dL 8 6     Results from last 7 days   Lab Units 10/17/19  2108 10/17/19  1954   AST U/L  --  18   ALT U/L  --  18   ALK PHOS U/L  --  54   TOTAL PROTEIN g/dL  --  7 0   ALBUMIN g/dL  --  3 6   TOTAL BILIRUBIN mg/dL  --  0 30   AMMONIA umol/L <10*  --      Results from last 7 days   Lab Units 10/18/19  0609 10/18/19  0143   POC GLUCOSE mg/dl 156* 190*     Results from last 7 days   Lab Units 10/17/19  1954   GLUCOSE RANDOM mg/dL 169*     Results from last 7 days   Lab Units 10/17/19  1954   TROPONIN I ng/mL <0 02         Results from last 7 days   Lab Units 10/17/19  1954   PROTIME seconds 14 3   INR  1 11   PTT seconds 24     Results from last 7 days   Lab Units 10/17/19  1954   LIPASE u/L 45*       Results from last 7 days   Lab Units 10/17/19  2359   AMPH/METH  Negative   BARBITURATE UR  Negative   BENZODIAZEPINE UR  Negative   COCAINE UR  Negative   METHADONE URINE  Negative   OPIATE UR  Negative   PCP UR  Negative   THC UR  Positive*     Results from last 7 days   Lab Units 10/17/19  1954   ETHANOL LVL mg/dL 65*     ED Treatment:   Medication Administration from 10/17/2019 1942 to 10/18/2019 0107       Date/Time Order Dose Route Action     10/17/2019 2109 LORazepam (ATIVAN) 2 mg/mL injection 1 mg 1 mg Intravenous Given     10/17/2019 2110 thiamine (VITAMIN B1) 100 mg in sodium chloride 0 9 % 50 mL IVPB 100 mg Intravenous New Bag        Past Medical History:   Diagnosis Date    [facility] 3/15/2013    Diabetes mellitus (HCC)     Hepatitis C     Myocardial infarction [facility])      Admitting Diagnosis: Altered mental status [R41 82]  Drug intoxication ([facility] ) [F12 329]  Age/Sex: 68 y o  male  Admission Orders:  Vitals every 4 hours  Neuro checks: Every 1 hour x 4 hours, then every 2 hours x 8 hours, then every 4 hours x 72 hours  CIWA protocol  Up with assist   Dysphagia assess  Vitam B1, HGB a1C, folate  Medications:  atorvastatin 20 mg Oral Daily   clopidogrel 75 mg Oral Daily   enoxaparin 40 mg Subcutaneous Daily   folic acid 1 mg Oral Daily   gabapentin 100 mg Oral TID   insulin glargine 10 Units Subcutaneous HS   insulin lispro 4 Units Subcutaneous TID With Meals   lisinopril 20 mg Oral Daily   mirtazapine 15 mg Oral HS   thiamine 100 mg Oral Daily     IP CONSULT TO NEUROLOGY    Network Utilization Review Department  Phone: [phone]; Fax [phone]  [email or web address]  ATTENTION: Please call with any questions or concerns to [phone]  and carefully listen to the prompts so that you are directed to the right person  Send all requests for admission clinical reviews, approved or denied determinations and any other requests to fax [phone]   All voicemails are confidential

## 2019-10-18 NOTE — ASSESSMENT & PLAN NOTE
No results found for: HGBA1C    No results for input(s): POCGLU in the last 72 hours  Blood Sugar Average: Last 72 hrs:    · Continue Lyrica t i d   · Will start on basal insulin and mealtime insulin  · Lantus 10 units at bedtime  · Humalog 4 units t i d   With meals

## 2019-10-18 NOTE — PLAN OF CARE
Problem: PHYSICAL THERAPY ADULT  Goal: Performs mobility at highest level of function for planned discharge setting  See evaluation for individualized goals  Description  Treatment/Interventions: Functional transfer training, LE strengthening/ROM, Elevations, Therapeutic exercise, Endurance training, Patient/family training, Equipment eval/education, Bed mobility, Gait training, Spoke to nursing, OT          See flowsheet documentation for full assessment, interventions and recommendations  Note:   Prognosis: Good  Problem List: Decreased strength, Impaired balance, Decreased endurance, Decreased mobility  Assessment: pt is a 66y/o m who presents to [facility] c toxic metabolic encephalopathy  also c stroke-like symptoms  MRI noted acute-subacute infarct L semiovale white matter  PMH significant for DM 2, alcoholism, CAD, liver cirrhosis, + lumbosacral spinal stenosis  at baseline, pt mod (I) c functional mobility c SPC  resides c family in 2 story home c 2 SWATI + 13 steps to bedroom  currently requires min (A)x1 to complete mobility tasks 2* deficits in strength, balance, gait quality, + activity tolerance noted in PT exam above  Barthel Index 50/100  ambulated 20' c RW + 36' s AD c seated rest btwn trials  min verbal cues required for RW advancement  able to sit OOB in chair at end of session c chair alarm activated  would benefit from skilled PT to maximize functional mobility + return home safely  upon d/c, recommend pt return home c family support + hhpt  PT eval of high complexity 2* unstable med status c pt requiring ongoing medical management 2* toxic metabolic encephalopathy  pt c multiple co-morbidities + mobility deficits above requiring (A)x1 to safely perform tasks  resides c family in 2 story home c 13 steps to 2nd floor  Barriers to Discharge: Inaccessible home environment     Recommendation: Home PT, Home with family support          See flowsheet documentation for full assessment

## 2019-10-18 NOTE — OCCUPATIONAL THERAPY NOTE
Occupational Therapy Evaluation        Patient Name: Apurva Interiano  BCHNO'E Date: 10/18/2019         10/18/19 0930   Note Type   Note type Eval only   Restrictions/Precautions   Weight Bearing Precautions Per Order No   Braces or Orthoses Other (Comment)  (none at baseline)   Other Precautions Cognitive; Chair Alarm; Bed Alarm;Seizure;Multiple lines;Telemetry; Fall Risk   Pain Assessment   Pain Assessment No/denies pain   Pain Score No Pain   Home Living   Type of Home House   Home Layout Two level;Bed/bath upstairs;Stairs to enter with rails  (2 SWATI thru the garage)   Bathroom Shower/Tub Tub/shower unit   [employer] Raised   Bathroom Equipment Grab bars in shower;Grab bars around toilet   [address] [facility]   Additional Comments ambulates with SPC   Prior Function   Level of [place] Independent with ADLs and functional mobility   Lives With [employer] Help From Family   ADL Assistance Independent   IADLs Needs assistance   Falls in the last 6 months 0   Vocational Retired   Comments patient does not drive   Lifestyle   Autonomy Patient reported independent with basic self care, transfers and ambulation  patient lives with family in a  2 story  house, 2 SWATI and full flight to 2nd floor  patient ambulates with SPC and does not drive  family assists with IADLs     Reciprocal Relationships Supportive family   Service to Others Retired housekeeping business   Intrinsic Gratification Enjoys working out     Dominic Taylor 19 ([address] [facility]) [address]  5  [address] [facility] 5  Supervision/Setup   [address] 4  Minimal Assistance   LB [address] 3  Moderate Assistance   [address] 4  [address] 2  Maximal [address]  3  Moderate Assistance   Functional Assistance 4  Minimal Assistance   Bed Mobility   Rolling R 4  Minimal assistance Additional items Assist x 1;HOB elevated; Increased time required;Verbal cues;LE management   Supine to Sit 4  Minimal assistance   Additional items Assist x 1;Bedrails;HOB elevated; Increased time required;Verbal cues;LE management   Transfers   Sit to Stand 4  Minimal assistance   Additional items Assist x 1; Increased time required;Verbal cues   Stand to Sit 4  Minimal assistance   Additional items Assist x 1; Increased time required;Verbal cues   Toilet transfer 4  Minimal assistance   Additional items Assist x 1; Increased time required;Standard toilet;Verbal cues   Functional Mobility   Functional Mobility 4  Minimal assistance   Additional items Rolling walker   Balance   Static Sitting Good   Dynamic Sitting Fair +   Static Standing Fair +   Dynamic Standing Fair   Activity Tolerance   Activity Tolerance Patient limited by fatigue   Nurse Made Aware RN verbalized patient appropriate for therapy  RUE Assessment   RUE Assessment WFL   LUE Assessment   LUE Assessment WFL   Hand Function   Gross Motor Coordination Functional   Fine Motor Coordination Functional   Sensation   Light Touch No apparent deficits  (BUEs)   Vision-Basic Assessment   Current Vision Wears glasses all the time  (not available bedside)   Perception   Inattention/Neglect Appears intact   Cognition   Overall Cognitive Status Impaired   Arousal/Participation Alert; Responsive; Cooperative   Attention Within functional limits   Orientation Level Oriented to person;Oriented to place;Oriented to time;Disoriented to situation   Memory Decreased recall of biographical information;Decreased recall of recent events   Following Commands Follows multistep commands without difficulty   Assessment   Limitation Decreased ADL status; Decreased Safe judgement during ADL;Decreased cognition;Decreased endurance;Decreased self-care trans;Decreased high-level ADLs   Prognosis Good   Assessment Patient is a 68 y o  male seen for OT evaluation s/p admit to [facility] on 08/52[facility]7738 w/Toxic metabolic encephalopathy  Commorbidities affecting patient's functional performance at time of assessment include: Alcoholism, Toxic metabolic encephalopathy, CAD, depression, HTN, Type 2 DM  Orders placed for OT evaluation and treatment  Performed at least two patient identifiers during session including name and wristband  Prior to admission, Patient reported independent with basic self care, transfers and ambulation  patient lives with family in a  2 story  house, 2 SWATI and full flight to 2nd floor  patient ambulates with SPC and does not drive  family assists with IADLs  Personal factors affecting patient at time of initial evaluation include: steps to enter, limited insight into deficits, non compliance, decreased initiation and engagement, difficulty performing ADLs and difficulty performing IADLs  Upon evaluation, patient requires minimal  assist for UB ADLs, moderate assist for LB ADLs, transfers and functional ambulation in room and bathroom with minimal  assist, with the use of Rolling Walker  Occupational performance is affected by the following deficits: anxiety, orientation, impulsive behavior, impaired gross motor coordination, dynamic sit/ stand balance deficit with poor standing tolerance time for self care and functional mobility, decreased activity tolerance, impaired memory, impaired problem solving, decreased safety awareness and postural control and postural alignment deficit, requiring external assistance to complete transitional movements  Therapist completed  extensive additional review of medical records and additional review of physical, cognitive or psychosocial history, clinical examination identifying 5 or more performance deficits, clinical decision making of a high complexity , consistent with a high complexity level evaluation   Patient to benefit from continued Occupational Therapy treatment while in the hospital to address deficits as defined above and maximize level of functional independence with ADLs and functional mobility  Occupational Performance areas to address include: bathing/ shower, dressing, toilet hygiene, transfer to all surfaces, functional ambulation, functional mobility, community mobility, emergency response, health maintenance, medication routine/ management, IADLs: safety procedures, IADLs: meal prep/ clean up, Leisure Participation and Social participation  Goals   Patient Goals I want to be able to walk to the bathroom   Plan   Treatment Interventions ADL retraining;Functional transfer training; Endurance training;Patient/family training;Equipment evaluation/education; Compensatory technique education;Continued evaluation;Cardiac education; Energy conservation; Activityengagement   Goal Expiration Date 11/01/19   OT Frequency 3-5x/wk   Recommendation   OT Discharge Recommendation Home OT   Barthel Index   Feeding 10   Bathing 0   Grooming Score 5   Dressing Score 5   Bladder Score 10   Bowels Score 10   Toilet Use Score 5   Transfers (Bed/Chair) Score 10   Mobility (Level Surface) Score 0   Stairs Score 0   Barthel Index Score 55   Modified [place] Scale   Modified [place] Scale 4       1 - Patient will verbalize and demonstrate use of energy conservation/ deep breathing technique and work simplification skills during functional activity with no verbal cues  2 - Patient will verbalize and demonstrate good body mechanics and joint protection techniques during  ADLs/ IADLs with no verbal cues  3 - Patient will increase OOB/ sitting tolerance to 2-4 hours per day for increased participation in self care and leisure tasks with no s/s of exertion  4 - Patient will increase standing tolerance time to 5  minutes with unilateral UE support to complete sink level ADLs@ mod I level  5 - Patient will increase sitting tolerance at edge of bed to 20 minutes to complete UB ADLs @ set up assist level      6 - Patient will transfer bed to Chair / toilet at Set up assist level with AD as indicated  7 - Patient will complete UB ADLs with set up assist      8 - Patient will complete LB ADLs with min assist with the use of adaptive equipment       9 - Patient will complete toileting hygiene with set up assist/ supervision for thoroughness    10 - Patient/ Family  will demonstrate competency with UE Home Exercise Program

## 2019-10-18 NOTE — SOCIAL WORK
LOS OBS GMLOS none  Per SLIM patient is cleared for discharge today  Patient is agreeable to [facility] services with [facility] who have accepted patient for service  A post acute care recommendation was made by your care team for [facility]  Discussed [place] of Choice with patient  List of agencies given to patient via in person  patient aware the list is custom filtered for them by preference  and that [facility] post acute providers are designated  Patient states his family will transport him home  Patient asked this cm to call his dtr  CM phoned dtrcassaangélica who states patient's dtr-Kaley will come to transport patient home soon  Nurse and slim notified

## 2019-10-18 NOTE — ED NOTES
1  CC: Altered Mental Status - pt arrived by EMS and was completely unable to speak or follow most commands; stroke alert called and evaluated by neurology    2  OS: pt previously disoriented - pt awake and speaking upon return from MRI    3  Abnormal labs/focused assessment/vitals: Pt unable to speak upon arrival to ED, pt awake and oriented upon return from MRI    4  Medication / Drips: Ativan 1 mg; thiamine    5  Narcotic time/ pain: none    6  IV lines/drains/etc : 18 L AC    7  Isolation Status: Standard    8  Skin: intact    9  Ambulation Status: pt did not ambulate in ED - hx of neuropathy, uses cane    10  TRAUMA? No    11   Phone Number: keshia Lerner, RN  10/17/19 8663       Brandi Ware, RN  10/17/19 9697

## 2019-10-18 NOTE — PLAN OF CARE
Problem: OCCUPATIONAL THERAPY ADULT  Goal: Performs self-care activities at highest level of function for planned discharge setting  See evaluation for individualized goals  Description  Treatment Interventions: ADL retraining, Functional transfer training, Endurance training, Patient/family training, Equipment evaluation/education, Compensatory technique education, Continued evaluation, Cardiac education, Energy conservation, Activityengagement          See flowsheet documentation for full assessment, interventions and recommendations  Note:   Limitation: Decreased ADL status, Decreased Safe judgement during ADL, Decreased cognition, Decreased endurance, Decreased self-care trans, Decreased high-level ADLs  Prognosis: Good  Assessment: Patient is a 68 y o  male seen for OT evaluation s/p admit to [address] [place] [address] on 77/67/9124 w/Toxic metabolic encephalopathy  Commorbidities affecting patient's functional performance at time of assessment include: Alcoholism, Toxic metabolic encephalopathy, CAD, depression, HTN, Type 2 DM  Orders placed for OT evaluation and treatment  Performed at least two patient identifiers during session including name and wristband  Prior to admission, Patient reported independent with basic self care, transfers and ambulation  patient lives with family in a  2 story  house, 2 SWATI and full flight to 2nd floor  patient ambulates with SPC and does not drive  family assists with IADLs  Personal factors affecting patient at time of initial evaluation include: steps to enter, limited insight into deficits, non compliance, decreased initiation and engagement, difficulty performing ADLs and difficulty performing IADLs  Upon evaluation, patient requires minimal  assist for UB ADLs, moderate assist for LB ADLs, transfers and functional ambulation in room and bathroom with minimal  assist, with the use of Rolling Walker   Occupational performance is affected by the following deficits: anxiety, orientation, impulsive behavior, impaired gross motor coordination, dynamic sit/ stand balance deficit with poor standing tolerance time for self care and functional mobility, decreased activity tolerance, impaired memory, impaired problem solving, decreased safety awareness and postural control and postural alignment deficit, requiring external assistance to complete transitional movements  Therapist completed  extensive additional review of medical records and additional review of physical, cognitive or psychosocial history, clinical examination identifying 5 or more performance deficits, clinical decision making of a high complexity , consistent with a high complexity level evaluation  Patient to benefit from continued Occupational Therapy treatment while in the hospital to address deficits as defined above and maximize level of functional independence with ADLs and functional mobility  Occupational Performance areas to address include: bathing/ shower, dressing, toilet hygiene, transfer to all surfaces, functional ambulation, functional mobility, community mobility, emergency response, health maintenance, medication routine/ management, IADLs: safety procedures, IADLs: meal prep/ clean up, Leisure Participation and Social participation        OT Discharge Recommendation: Home OT

## 2019-10-18 NOTE — PLAN OF CARE
Problem: Potential for Falls  Goal: Patient will remain free of falls  Description  INTERVENTIONS:  - Assess patient frequently for physical needs  -  Identify cognitive and physical deficits and behaviors that affect risk of falls    -  [place] fall precautions as indicated by assessment   - Educate patient/family on patient safety including physical limitations  - Instruct patient to call for assistance with activity based on assessment  - Modify environment to reduce risk of injury  - Consider OT/PT consult to assist with strengthening/mobility  Outcome: Not Progressing     Problem: Prexisting or High Potential for Compromised Skin Integrity  Goal: Skin integrity is maintained or improved  Description  INTERVENTIONS:  - Identify patients at risk for skin breakdown  - Assess and monitor skin integrity  - Assess and monitor nutrition and hydration status  - Monitor labs   - Assess for incontinence   - Turn and reposition patient  - Assist with mobility/ambulation  - Relieve pressure over bony prominences  - Avoid friction and shearing  - Provide appropriate hygiene as needed including keeping skin clean and dry  - Evaluate need for skin moisturizer/barrier cream  - Collaborate with interdisciplinary team   - Patient/family teaching  - Consider wound care consult   Outcome: Not Progressing     Problem: PAIN - ADULT  Goal: Verbalizes/displays adequate comfort level or baseline comfort level  Description  Interventions:  - Encourage patient to monitor pain and request assistance  - Assess pain using appropriate pain scale  - Administer analgesics based on type and severity of pain and evaluate response  - Implement non-pharmacological measures as appropriate and evaluate response  - Consider cultural and social influences on pain and pain management  - Notify physician/advanced practitioner if interventions unsuccessful or patient reports new pain  Outcome: Not Progressing     Problem: INFECTION - ADULT  Goal: Absence or prevention of progression during hospitalization  Description  INTERVENTIONS:  - Assess and monitor for signs and symptoms of infection  - Monitor lab/diagnostic results  - Monitor all insertion sites, i e  indwelling lines, tubes, and drains  - Monitor endotracheal if appropriate and nasal secretions for changes in amount and color  - [place] appropriate cooling/warming therapies per order  - Administer medications as ordered  - Instruct and encourage patient and family to use good hand hygiene technique  - Identify and instruct in appropriate isolation precautions for identified infection/condition  Outcome: Not Progressing  Goal: Absence of fever/infection during neutropenic period  Description  INTERVENTIONS:  - Monitor WBC    Outcome: Not Progressing     Problem: SAFETY ADULT  Goal: Maintain or return to baseline ADL function  Description  INTERVENTIONS:  -  Assess patient's ability to carry out ADLs; assess patient's baseline for ADL function and identify physical deficits which impact ability to perform ADLs (bathing, care of mouth/teeth, toileting, grooming, dressing, etc )  - Assess/evaluate cause of self-care deficits   - Assess range of motion  - Assess patient's mobility; develop plan if impaired  - Assess patient's need for assistive devices and provide as appropriate  - Encourage maximum independence but intervene and supervise when necessary  - Involve family in performance of ADLs  - Assess for home care needs following discharge   - Consider OT consult to assist with ADL evaluation and planning for discharge  - Provide patient education as appropriate  Outcome: Not Progressing  Goal: Maintain or return mobility status to optimal level  Description  INTERVENTIONS:  - Assess patient's baseline mobility status (ambulation, transfers, stairs, etc )    - Identify cognitive and physical deficits and behaviors that affect mobility  - Identify mobility aids required to assist with transfers and/or ambulation (gait belt, sit-to-stand, lift, walker, cane, etc )  - [place] fall precautions as indicated by assessment  - Record patient progress and toleration of activity level on Mobility SBAR; progress patient to next Phase/Stage  - Instruct patient to call for assistance with activity based on assessment  - Consider rehabilitation consult to assist with strengthening/weightbearing, etc   Outcome: Not Progressing     Problem: DISCHARGE PLANNING  Goal: Discharge to home or other facility with appropriate resources  Description  INTERVENTIONS:  - Identify barriers to discharge w/patient and caregiver  - Arrange for needed discharge resources and transportation as appropriate  - Identify discharge learning needs (meds, wound care, etc )  - Arrange for interpretive services to assist at discharge as needed  - Refer to Case Management Department for coordinating discharge planning if the patient needs post-hospital services based on physician/advanced practitioner order or complex needs related to functional status, cognitive ability, or social support system  Outcome: Not Progressing     Problem: Knowledge Deficit  Goal: Patient/family/caregiver demonstrates understanding of disease process, treatment plan, medications, and discharge instructions  Description  Complete learning assessment and assess knowledge base    Interventions:  - Provide teaching at level of understanding  - Provide teaching via preferred learning methods  Outcome: Not Progressing

## 2019-10-18 NOTE — PROGRESS NOTES
Progress Note - Erminio Gowers 1945, 68 y o  male MRN: [id number]    Unit/Bed#: -01 Encounter: [id number]    Primary Care Provider: No primary care provider on file  Date and time admitted to hospital: 10/17/2019  7:42 PM        * Toxic metabolic encephalopathy  Assessment & Plan  CT and MRI both obtained in the ED  MRI demonstrated old lacunar infarcts, small focal area of restricted diffusion and left centrum semiovale, possibly representing a subacute/acute infarct  Case reviewed by Neurology, who at this point do not suspect acute CVA  At this time, my suspicion is that patient's altered mental status is actually secondary to ingestion of both marijuana and alcohol  Neuro deficits:  No focal deficits  Patient oriented to person and place, but not time   Neurology recommendations appreciated   Continue [facility] protocol   EEG ordered and pending      Depression  Assessment & Plan  · Continue mirtazapine    Essential hypertension  Assessment & Plan  · Continue lisinopril daily    Coronary artery disease involving native coronary artery of native heart without angina pectoris  Assessment & Plan  · Continue statin  · Continue Plavix    Alcoholism ([facility] )  Assessment & Plan  Patient initially stated he had a drink for 2 weeks, but then admitted to drinking recently due to the death of his brother  He had alcohol in his system on admission  Suspect that alcohol ingestion along with marijuana ingestion are the culprit of his current encephalopathy  Continue [facility]    Type 2 diabetes mellitus with diabetic polyneuropathy, without long-term current use of insulin ([facility] )  Assessment & Plan  No results found for: HGBA1C    No results for input(s): POCGLU in the last 72 hours      Blood Sugar Average: Last 72 hrs:  Blood sugars stable continue current region      VTE Pharmacologic Prophylaxis:   Pharmacologic: Heparin  Mechanical VTE Prophylaxis in Place: Yes    Patient Centered Rounds: I have performed bedside rounds with nursing staff today  Discussions with Specialists or Other Care Team Provider:  Neurology    Education and Discussions with Family / Patient:  Patient    Time Spent for Care: 30 minutes  More than 50% of total time spent on counseling and coordination of care as described above  Current Length of Stay: 0 day(s)    Current Patient Status: Observation   Certification Statement: The patient will continue to require additional inpatient hospital stay due to Pending EEG    Discharge Plan:  Pending EEG if normal can be discharged    Code Status: Level 1 - Full Code      Subjective:   Patient seen and examined  He is alert and oriented  Denies any complaints at this time  No dizziness at this time  Objective:     Vitals:   Temp (24hrs), Av °F (36 7 °C), Min:97 5 °F (36 4 °C), Max:98 7 °F (37 1 °C)    Temp:  [97 5 °F (36 4 °C)-98 7 °F (37 1 °C)] 98 6 °F (37 °C)  HR:  [62-92] 67  Resp:  [6-35] 18  BP: (140-210)/(58-90) 160/70  SpO2:  [94 %-100 %] 99 %  Body mass index is 26 41 kg/m²  Input and Output Summary (last 24 hours): Intake/Output Summary (Last 24 hours) at 10/18/2019 1538  Last data filed at 10/18/2019 1500  Gross per 24 hour   Intake 450 ml   Output    Net 450 ml       Physical Exam:     Physical Exam   Constitutional: He is oriented to person, place, and time  He appears well-developed and well-nourished  HENT:   Head: Normocephalic and atraumatic  Eyes: Pupils are equal, round, and reactive to light  EOM are normal    Neck: Normal range of motion  Neck supple  Cardiovascular: Normal rate, regular rhythm and normal heart sounds  Pulmonary/Chest: Effort normal and breath sounds normal    Abdominal: Soft  Bowel sounds are normal    Musculoskeletal: Normal range of motion  Neurological: He is alert and oriented to person, place, and time  Skin: Skin is warm and dry         Additional Data:     Labs:    Results from last 7 days   Lab Units 10/18/19  0549 10/17/19  1954   WBC Thousand/uL 5 56 5 19   HEMOGLOBIN g/dL 10 6* 11 4*   HEMATOCRIT % 31 4* 34 8*   PLATELETS Thousands/uL 202 222   NEUTROS PCT %  --  64   LYMPHS PCT %  --  25   MONOS PCT %  --  9   EOS PCT %  --  1     Results from last 7 days   Lab Units 10/17/19  1954   SODIUM mmol/L 142   POTASSIUM mmol/L 3 9   CHLORIDE mmol/L 105   CO2 mmol/L 25   BUN mg/dL 12   CREATININE mg/dL 0 96   ANION GAP mmol/L 12   CALCIUM mg/dL 8 6   ALBUMIN g/dL 3 6   TOTAL BILIRUBIN mg/dL 0 30   ALK PHOS U/L 54   ALT U/L 18   AST U/L 18   GLUCOSE RANDOM mg/dL 169*     Results from last 7 days   Lab Units 10/17/19  1954   INR  1 11     Results from last 7 days   Lab Units 10/18/19  1055 10/18/19  0609 10/18/19  0143   POC GLUCOSE mg/dl 235* 156* 190*                   * I Have Reviewed All Lab Data Listed Above  * Additional Pertinent Lab Tests Reviewed: [facility] Admission Reviewed    Imaging:    Imaging Reports Reviewed Today Include:   Imaging Personally Reviewed by Myself Includes:  Mri brain    Recent Cultures (last 7 days):           Last 24 Hours Medication List:     Current Facility-Administered Medications:  aspirin 81 mg Oral Daily Kathia Lindsey PA-C   atorvastatin 20 mg Oral Daily Gosia Guest Veres, DO   clopidogrel 75 mg Oral Daily Gosia Guest Veres, DO   enoxaparin 40 mg Subcutaneous Daily Gosia Guest Veres, DO   folic acid 1 mg Oral Daily Gosia Guest Veres, DO   gabapentin 100 mg Oral TID Jackeline Grayson, DO   insulin glargine 10 Units Subcutaneous HS Gosia Guest Veres, DO   insulin lispro 4 Units Subcutaneous TID With Meals David Yusuf, DO   lisinopril 20 mg Oral Daily Gosia Guest Veres, DO   mirtazapine 15 mg Oral HS Gosia Guest Veres, DO   thiamine 100 mg Oral Daily Jackeline Grayson, DO        Today, Patient Was Seen By: Nanci Torre MD    ** Please Note: Dictation voice to text software may have been used in the creation of this document   **

## 2019-10-18 NOTE — PHYSICAL THERAPY NOTE
PT Evaluation (23min)  (9:29-9:52)    Past Medical History:   Diagnosis Date    Chronic hepatitis C ([facility] ) 3/15/2013    Diabetes mellitus ([email or web address] )     Hepatitis C     Myocardial infarction [facility])         10/18/19 0952   Note Type   Note type Eval only   Pain Assessment   Pain Assessment No/denies pain   Home Living   Type of [address] Two level;Bed/bath upstairs;Stairs to enter with rails  (2 SWATI through garage; 13 steps to bedroom)   Bathroom Shower/Tub Tub/shower unit   [employer] Raised   Bathroom Equipment Grab bars in shower;Grab bars around toilet   [address]   Prior Function   Level of [address] with ADLs and functional mobility  (ambulates c SPC)   Lives With [employer] Help From Family   ADL Assistance Independent   IADLs Needs assistance   Falls in the last 6 months 0   Vocational Retired   Comments (-) drive   Restrictions/Precautions   [place] Bearing Precautions Per Order No   Other Precautions Chair Alarm; Bed Alarm;Telemetry; Fall Risk   General   Additional Pertinent History pt presents to [facility] c change in mental status + stroke-like symptoms  MRI noted acute-subacute infarct L semiovale while matter  PT consulted for mobility + d/c planning  up c (A)  Family/Caregiver Present No   Cognition   Orientation Level Oriented X4   RUE Assessment   RUE Assessment WFL   LUE Assessment   LUE Assessment WFL   RLE Assessment   RLE Assessment WFL  (4/5)   LLE Assessment   LLE Assessment WFL  (4/5)   Coordination   Movements are Fluid and Coordinated 1   Sensation WFL   Bed Mobility   Rolling R 4  Minimal assistance   Additional items Assist x 1;HOB elevated; Bedrails; Increased time required;Verbal cues  (cues for log roll technique)   Supine to Sit 4  Minimal assistance   Additional items Assist x 1;HOB elevated; Increased time required;Verbal cues   Transfers   Sit to Stand 4  Minimal assistance   Additional items Assist x 1;Verbal cues; Increased time required   Stand to Sit 4  Minimal assistance   Additional items Assist x 2;Armrests; Verbal cues; Increased time required   Toilet transfer 4  Minimal assistance   Additional items Assist x 1;Verbal cues;Standard toilet; Increased time required  (c grab bar)   Ambulation/Elevation   Gait pattern Narrow JUANA; Forward Flexion;Decreased foot clearance; Inconsistent vahe   Gait Assistance 4  Minimal assist   Additional items Assist x 1;Verbal cues   Assistive Device Rolling walker;None   Distance 20' c RW + 40' s AD  (seated rest btwn trials)   Stair Management Assistance Not tested   Balance   Static Sitting Fair +   Dynamic Sitting Fair +   Static Standing Fair   Dynamic Standing Fair   Ambulatory Fair   Endurance Deficit   Endurance Deficit   (SaO2 96% on RA)   Activity Tolerance   Activity Tolerance Patient limited by fatigue   Nurse Made Aware RN Dhiraj aware pt ambulated (A)x1 c + s RW  pt OOB in chair c chair alarm activated  Assessment   Prognosis Good   Problem List Decreased strength; Impaired balance;Decreased endurance;Decreased mobility   Assessment pt is a 64y/o m who presents to [facility] c toxic metabolic encephalopathy  also c stroke-like symptoms  MRI noted acute-subacute infarct L semiovale white matter  PMH significant for DM 2, alcoholism, CAD, liver cirrhosis, + lumbosacral spinal stenosis  at baseline, pt mod (I) c functional mobility c SPC  resides c family in 2 story home c 2 SWATI + 13 steps to bedroom  currently requires min (A)x1 to complete mobility tasks 2* deficits in strength, balance, gait quality, + activity tolerance noted in PT exam above  [place] Index 50/100  ambulated 20' c RW + 36' s AD c seated rest btwn trials  min verbal cues required for RW advancement  able to sit OOB in chair at end of session c chair alarm activated  would benefit from skilled PT to maximize functional mobility + return home safely   upon d/c, recommend pt return home c family support + hhpt  PT eval of high complexity 2* unstable med status c pt requiring ongoing medical management 2* toxic metabolic encephalopathy  pt c multiple co-morbidities + mobility deficits above requiring (A)x1 to safely perform tasks  resides c family in 2 story home c 13 steps to 2nd floor  Barriers to Discharge Inaccessible home environment   Goals   Patient Goals "to go home and take care of myself"  STG Expiration Date 10/28/19   Short Term Goal #1 1  increase strength 1/2 grade to improve overall functional mobility, 2  perform bed mobility mod (I) to decrease caregiver burden, 3  perform transfers mod (I) to safely perform ADLs, 4  ambulate >150' mod (I)/(I) c no or least restrictive AD to safely navigate home environment, 5  negotiate 13 stairs mod (I) to safely access bedroom   Plan   Treatment/Interventions Functional transfer training;LE strengthening/ROM; Elevations; Therapeutic exercise; Endurance training;Patient/family training;Equipment eval/education; Bed mobility;Gait training;Spoke to nursing;OT   PT Frequency 5x/wk   Recommendation   Recommendation Home PT; Home with family support   Modified [place] Scale   Modified [place] Scale 4   [place] Index   Feeding 5   Bathing 0   Grooming Score 5   Dressing Score 5   Bladder Score 10   Bowels Score 10   Toilet Use Score 5   Transfers (Bed/Chair) Score 10   Mobility (Level Surface) Score 0   Stairs Score 0   B[place]hel Index Score 50     Ghassan Lizarraga, PT, DPT

## 2019-10-18 NOTE — CONSULTS
Consultation - Neurology   Geraldine Mitchell 68 y o  male MRN: [id number]  Unit/Bed#: -01 Encounter: [id number]      Assessment/Plan   Assessment/Plan:  22-year-old male with CAD, diabetes, peripheral neuropathy, hepatitis-C, alcohol abuse with remote seizure several decades ago who presents with poorly responsive event/questionable seizure activity, as well as speech disturbance  In regards to the MRI brain, restricted diffusion was noted in the left centrum semiovale (do feel it is artifact/shine through with advanced microvascular disease on T2/FLAIR)    Unlikely withdrawal seizure as patient's alcohol level in ED was 60      1  Poorly responsive event, seizure activity, speech disturbance:  -stroke pathway initiated:  -MRI brain with restricted diffusion in left centrum semiovale  -CTA head/neck with intracranial vertebral artery disease as well as intracranial carotid disease, no critical stenosis identified  -echocardiogram pending  -will obtain routine EEG as well given seizure activity leading to presentation   -hold on AED initiation unless EEG abnormal   -initiate seizure precautions  -on Plavix and low-dose statin prior to admission per home med review, will proceed with dual antiplatelet for 3 weeks and then continue plavix  -hemoglobin A1c pending, lipid panel with LDL of 16  -neuro checks  -telemetry monitoring  -stroke education provided  -therapies to follow    2  Medical management per primary team:  -reviewed alcohol level and UDS, no hyperammonemia  -agree with CIWA protocol and thiamine/folic acid    If routine EEG negative, no further inpatient neurologic workup   Discussed plan of care with attending neurologist     History of Present Illness     Reason for Consult / Principal Problem:  Poorly responsive events/seizure activity:  Word-finding difficulty    HPI: Geraldine Mitchell is a 68 y o  male with history as mentioned above in assessment who neurology is asked to evaluate in regards to unresponsive episode concerning for seizure activity last night  History obtained per reviewing current notes as well as discussing with patient this morning  Family is not available at bedside for supplemental history  The patient states ultimately he felt in his normal state of health yesterday, he did state that he felt he took too many edibles"in the afternoon  He does not recall passing out, but recalls waking up in his home and his family driving him to the ED  He was found by his family poorly responsive with shaking last evening, having speech difficulty as well (the patient does recall having trouble with his speech last night)  Due to his presentation stroke alert was activated upon arrival to ED   systolic, initial NIH calculated as 8  CT head revealed no acute intracranial pathology, chronic basal ganglia/pontine/brainstem infarctions, CTA head/neck revealed bilateral carotid atherosclerosis, as well as carotid bifurcation atherosclerosis as well  No large vessel occlusion  Upon discussion with the patient this morning, he does feel better, denies any focal neurologic complaints, states he feels sleepy as he has not been sleeping much at night of lately  He does endorse feeling down and depressed in regards to significant stressors lately, due in large part to the recent passing of his brother several months ago (patient states he was a part of his whole life)  The patient denies any recent alcohol use (medical alcohol level was 60 on initial presentation)  He denies any recent infection or illness, denies any clear fevers/chills/night sweats  Does use marijuana proximally monthly  Aside from positive THC, UDS was negative     He states he has a history of seizures many decades ago, no interim seizures since then  No personal history of stroke  He is more interactive this morning, feels better, no further speech disturbance, still slightly sleepy    No focal deficits on exam     Inpatient consult to Neurology  Consult performed by: Maximiliano Malone PA-C  Consult ordered by: Ellen Holly DO          Review of Systems   Constitutional: Negative  HENT: Negative  Eyes: Negative  Respiratory: Negative  Cardiovascular: Negative  Gastrointestinal: Negative  Musculoskeletal: Negative  Skin: Negative  Neurological: Positive for seizures, syncope and speech difficulty  Negative for dizziness, tremors, facial asymmetry, weakness, light-headedness, numbness and headaches  All other ROS reviewed and negative  Historical Information   Past Medical History:   Diagnosis Date    Chronic hepatitis C ([place] [address] ) 3/15/2013    Diabetes mellitus ([place] [address] )     Hepatitis C     Myocardial infarction ([place] [address] )      No past surgical history on file  Social History   Social History     Substance and Sexual Activity   Alcohol Use Yes    Alcohol/week: 21 0 standard drinks    Types: 21 Cans of beer per week    Frequency: 4 or more times a week    Drinks per session: 3 or 4     Social History     Substance and Sexual Activity   Drug Use Yes    Types: Marijuana     Social History     Tobacco Use   Smoking Status Not on file     Family History: No family history on file  Review of previous medical records was completed      Meds/Allergies   current meds:   Current Facility-Administered Medications   Medication Dose Route Frequency    atorvastatin (LIPITOR) tablet 20 mg  20 mg Oral Daily    clopidogrel (PLAVIX) tablet 75 mg  75 mg Oral Daily    enoxaparin (LOVENOX) subcutaneous injection 40 mg  40 mg Subcutaneous Daily    folic acid (FOLVITE) tablet 1 mg  1 mg Oral Daily    gabapentin (NEURONTIN) capsule 100 mg  100 mg Oral TID    insulin glargine (LANTUS) subcutaneous injection 10 Units 0 1 mL  10 Units Subcutaneous HS    insulin lispro (HumaLOG) 100 units/mL subcutaneous injection 4 Units  4 Units Subcutaneous TID With Meals    lisinopril (ZESTRIL) tablet 20 mg  20 mg Oral Daily    mirtazapine (REMERON) tablet 15 mg  15 mg Oral HS    thiamine (VITAMIN B1) tablet 100 mg  100 mg Oral Daily    and PTA meds:   Prior to Admission Medications   Prescriptions Last Dose Informant Patient Reported? Taking?   atorvastatin (LIPITOR) 20 mg tablet   Yes Yes   Sig: Take 20 mg by mouth daily   clopidogrel (PLAVIX) 75 mg tablet   Yes Yes   Sig: Take 75 mg by mouth daily   gabapentin (NEURONTIN) 100 mg capsule   Yes Yes   Sig: Take 100 mg by mouth 3 (three) times a day   lisinopril (ZESTRIL) 20 mg tablet   Yes Yes   Sig: Take 20 mg by mouth daily   mirtazapine (REMERON) 15 mg tablet   Yes Yes   Sig: Take 15 mg by mouth daily at bedtime   pregabalin (LYRICA) 300 MG capsule   Yes Yes   Sig: Take 300 mg by mouth 3 (three) times a day   sitaGLIPtin-metFORMIN (JANUMET)  MG per tablet   Yes Yes   Sig: Take 1 tablet by mouth 2 (two) times a day with meals      Facility-Administered Medications: None       No Known Allergies    Objective   Vitals:Blood pressure 145/67, pulse 72, temperature 97 8 °F (36 6 °C), temperature source Oral, resp  rate 17, height 5' 5" (1 651 m), weight 72 kg (158 lb 11 7 oz), SpO2 99 %  ,Body mass index is 26 41 kg/m²  Intake/Output Summary (Last 24 hours) at 10/18/2019 0754  Last data filed at 10/17/2019 2146  Gross per 24 hour   Intake 50 ml   Output    Net 50 ml       Invasive Devices: Invasive Devices     Peripheral Intravenous Line            Peripheral IV 10/17/19 1 day    Peripheral IV 10/18/19 Dorsal (posterior); Left Forearm less than 1 day                Physical Exam   Eyes: Pupils are equal, round, and reactive to light  EOM are normal    Neurological: He has a normal Finger-Nose-Finger Test and a normal Heel to [employer] (Limited range of motion with right heel-to-shin due to like tightness)  Reflex Scores:       Patellar reflexes are 2+ on the right side and 2+ on the left side      Neurologic Exam     Mental Status     He is minimally somnolent, but with continued stimulation he is awake and alert and participates with entire exam   He is oriented to self as well as his family members, oriented to place as well as the month and year  He can name the time of the clock correctly, can name simple objects and colors without word-finding difficulty  Estefani Atkinson is fluent as well  Is dysarthric, but states he does not have his dentures (he feels speech is at baseline right now)  He follows commands fluently  Appears that if left unstimulated he drifts to sleep  Cranial Nerves     CN II   Visual fields full to confrontation  CN III, IV, VI   Pupils are equal, round, and reactive to light  Extraocular motions are normal    Nystagmus: none   Diplopia: none  Ophthalmoparesis: none  Conjugate gaze: present    CN V   Facial sensation intact  CN VII   Facial expression full, symmetric  CN VIII   CN VIII normal      CN IX, X   CN IX normal    CN X normal      CN XI   CN XI normal      CN XII   CN XII normal      Motor Exam   Muscle bulk: normal  Overall muscle tone: normal  Right arm pronator drift: absent  Left arm pronator drift: absent  5/5 strength throughout upper and lower extremities, no focal deficit or laterality on exam      Sensory Exam   Light touch normal    Vibration normal      Cold temperature sensation in distal extremities throughout  Gait, Coordination, and Reflexes     Gait  Gait: (Deferred due to right leg pain/tightness)    Coordination   Finger to nose coordination: normal  Heel to shin coordination: normal (Limited range of motion with right heel-to-shin due to like tightness)    Tremor   Resting tremor: absent  Intention tremor: absent    Reflexes   Right patellar: 2+  Left patellar: 2+  Right plantar: normal  Left plantar: normal  Right ankle clonus: absent  Left ankle clonus: absent  Cross adductors present with patellar DTRs         Lab Results:   CBC:   Results from last 7 days   Lab Units 10/18/19  0549 10/17/19  1954   WBC Thousand/uL 5 56 5 19   RBC Million/uL 3 35* 3 65*   HEMOGLOBIN g/dL 10 6* 11 4*   HEMATOCRIT % 31 4* 34 8*   MCV fL 94 95   PLATELETS Thousands/uL 202 222   , BMP/CMP:   Results from last 7 days   Lab Units 10/17/19  1954   SODIUM mmol/L 142   POTASSIUM mmol/L 3 9   CHLORIDE mmol/L 105   CO2 mmol/L 25   BUN mg/dL 12   CREATININE mg/dL 0 96   CALCIUM mg/dL 8 6   AST U/L 18   ALT U/L 18   ALK PHOS U/L 54   EGFR ml/min/1 73sq m 78   , Vitamin B12:   , HgBA1C:   , TSH:   , Coagulation:   Results from last 7 days   Lab Units 10/17/19  1954   INR  1 11   , Lipid Profile:   Results from last 7 days   Lab Units 10/18/19  0550   HDL mg/dL 31*   LDL CALC mg/dL 16   TRIGLYCERIDES mg/dL 141   , Ammonia:   Results from last 7 days   Lab Units 10/17/19  2108   AMMONIA umol/L <10*   , Urinalysis:       Invalid input(s): URIBILINOGEN, Drug Screen:   Results from last 7 days   Lab Units 10/17/19  2359   BARBITURATE UR  Negative   BENZODIAZEPINE UR  Negative   THC UR  Positive*   COCAINE UR  Negative   METHADONE URINE  Negative   OPIATE UR  Negative   PCP UR  Negative   , Medication Drug Levels:       Invalid input(s): CARBAMAZEPINE,  PHENOBARB, LACOSAMIDE, OXCARBAZEPINE  Imaging Studies: I have personally reviewed pertinent films in PACS   CT head stroke alert 10/17/2019: Multiple chronic infarcts and advanced microangiopathic disease  No evidence of acute vascular territorial infarction, intracranial hemorrhage or mass effect  CTA head/neck 10/17/2019: 1  Moderate (50-69%) stenosis in the distal vertebral arteries  No hemodynamically significant stenosis, dissection or occlusion of the carotid arteries  2   No significant stenosis or occlusion of the major vessels of the [place]  MRI brain 10/17/2019:    Small focal area of restricted diffusion in the left centrum semiovale white matter just above the posterior left insula (axial image 18, series 3) suspicious for acute/subacute infarct    No territorial infarct is identified      Old lacunar infarcts, chronic appearing white matter changes, and other findings as above  EKG, Pathology, and Other Studies: I have personally reviewed pertinent reports  VTE Prophylaxis: Sequential compression device (Venodyne)  and Enoxaparin (Lovenox)    Code Status: Level 1 - Full Code    Total time spent today 55 minutes   Discussed plan of care with patient and primary team:

## 2019-10-19 LAB
ATRIAL RATE: 71 BPM
GLUCOSE SERPL-MCNC: 176 MG/DL (ref 65–140)
P AXIS: 70 DEGREES
PR INTERVAL: 174 MS
QRS AXIS: 35 DEGREES
QRSD INTERVAL: 86 MS
QT INTERVAL: 376 MS
QTC INTERVAL: 408 MS
T WAVE AXIS: 50 DEGREES
VENTRICULAR RATE: 71 BPM

## 2019-10-19 PROCEDURE — 93010 ELECTROCARDIOGRAM REPORT: CPT | Performed by: INTERNAL MEDICINE

## 2019-10-23 LAB — VIT B1 BLD-SCNC: 195.1 NMOL/L (ref 66.5–200)

## 2019-11-25 ENCOUNTER — OFFICE VISIT (OUTPATIENT)
Dept: NEUROLOGY | Facility: CLINIC | Age: 74
End: 2019-11-25
Payer: COMMERCIAL

## 2019-11-25 VITALS
WEIGHT: 156 LBS | SYSTOLIC BLOOD PRESSURE: 120 MMHG | HEIGHT: 65 IN | BODY MASS INDEX: 25.99 KG/M2 | DIASTOLIC BLOOD PRESSURE: 70 MMHG

## 2019-11-25 DIAGNOSIS — R41.82 ALTERED MENTAL STATUS: ICD-10-CM

## 2019-11-25 DIAGNOSIS — F10.20 ALCOHOLISM (HCC): ICD-10-CM

## 2019-11-25 DIAGNOSIS — G92.8 TOXIC METABOLIC ENCEPHALOPATHY: Primary | ICD-10-CM

## 2019-11-25 PROCEDURE — 99215 OFFICE O/P EST HI 40 MIN: CPT | Performed by: PSYCHIATRY & NEUROLOGY

## 2019-11-25 RX ORDER — FOLIC ACID 1 MG/1
1 TABLET ORAL DAILY
Qty: 30 TABLET | Refills: 1 | Status: SHIPPED | OUTPATIENT
Start: 2019-11-25

## 2019-11-25 RX ORDER — OMEGA-3-ACID ETHYL ESTERS 1 G/1
2 CAPSULE, LIQUID FILLED ORAL 2 TIMES DAILY
COMMUNITY
Start: 2012-01-23

## 2019-11-25 RX ORDER — CLOPIDOGREL BISULFATE 75 MG/1
75 TABLET ORAL DAILY
Qty: 30 TABLET | Refills: 5 | Status: SHIPPED | OUTPATIENT
Start: 2019-11-25 | End: 2019-11-26 | Stop reason: SDUPTHER

## 2019-11-25 RX ORDER — LANOLIN ALCOHOL/MO/W.PET/CERES
100 CREAM (GRAM) TOPICAL DAILY
Qty: 30 TABLET | Refills: 1 | Status: SHIPPED | OUTPATIENT
Start: 2019-11-25

## 2019-11-25 NOTE — PROGRESS NOTES
Gerardo Kingsley is a 76 y o  male  No chief complaint on file  Assessment:  1  Toxic metabolic encephalopathy    2  Alcoholism ([facility] [address] )    3  Altered mental status          Discussion:  Patient's hospital records and imaging study results were reviewed, MRI scan of the brain showed restricted diffusion abnormality in the left central semiovale which could be a shined through with advanced microvascular disease, patient is status post 3 weeks of dual anti-platelet treatment he was advised to stop aspirin and just continue with Plavix medication refill was given, also it is not clear as to if he lost consciousness secondary to his history of alcohol versus seizure, regular EEG is normal would recommend the sleep-deprived EEG to make sure, patient to call me after the test to discuss the result, he does have some issues with his memories which could be related to his alcohol history, I have advised the patient to go off the alcohol and continue with thiamine and folic acid, he is also on a bunch of other medications and he is not in follow up with his family physician I have advised him to see 1 of her [facility] internal medicine to help with his other medication, he was advised not to drive for the time being, to be under constant supervision, continue with mentally stimulating exercises, we may need to do a complete [place] once he is off the alcohol to see where his baseline memory is and the wife understands, stroke education given to the patient, to go to the hospital if has any worsening symptoms and call me otherwise to see me back in 2 months and follow up with family physician      Subjective:    HPI   Patient is here in follow-up of to his recent discharge from the hospital, he is accompanied with his ex-wife, he was admitted into the hospital for an unresponsive episode concerning for seizure activity, patient is not a great historian and he tells me that he was drinking alcohol, he did too many it doubles in the afternoon and also he was drinking alcohol he does not remember any other thing and his wife was not at home when this happened and she got a phone call, he was found by other family members poorly responsive with shaking in the evening having speech difficulty as well, he says he is continuing to drink on and off, currently denies any headache no vision or speech difficulty denies any history of seizures he has been in alcohol rehab a few times, he denies any bowel and bladder incontinence, no focal weakness no other complaints      Vitals:    11/25/19 1600   BP: 120/70   BP Location: Left arm   Patient Position: Sitting   Cuff Size: Standard   Weight: 70 8 kg (156 lb)   Height: 5' 5" (1 651 m)       Current Medications    Current Outpatient Medications:     aspirin 81 mg chewable tablet, Chew 81 mg daily, Disp: , Rfl:     atorvastatin (LIPITOR) 20 mg tablet, Take 20 mg by mouth daily, Disp: , Rfl:     clopidogrel (PLAVIX) 75 mg tablet, Take 1 tablet (75 mg total) by mouth daily, Disp: 30 tablet, Rfl: 0    ferrous sulfate 325 (65 Fe) mg tablet, Take 325 mg by mouth daily with breakfast, Disp: , Rfl:     gabapentin (NEURONTIN) 100 mg capsule, Take 100 mg by mouth 3 (three) times a day, Disp: , Rfl:     lisinopril (ZESTRIL) 20 mg tablet, Take 20 mg by mouth daily, Disp: , Rfl:     mirtazapine (REMERON) 15 mg tablet, Take 15 mg by mouth daily at bedtime, Disp: , Rfl:     omega-3-acid ethyl esters (LOVAZA) 1 g capsule, Take 2 capsules by mouth 2 (two) times a day, Disp: , Rfl:     pregabalin (LYRICA) 300 MG capsule, Take 300 mg by mouth 3 (three) times a day, Disp: , Rfl:     sitaGLIPtin-metFORMIN (JANUMET)  MG per tablet, Take 1 tablet by mouth 2 (two) times a day with meals, Disp: , Rfl:     folic acid (FOLVITE) 1 mg tablet, Take 1 tablet (1 mg total) by mouth daily (Patient not taking: Reported on 11/25/2019), Disp: 30 tablet, Rfl: 0    thiamine 100 MG tablet, Take 1 tablet (100 mg total) by mouth daily (Patient not taking: Reported on 11/25/2019), Disp: 30 tablet, Rfl: 0      Allergies  Patient has no known allergies  Past Medical History  Past Medical History:   Diagnosis Date    Chronic hepatitis C ([facility] ) 3/15/2013    Diabetes mellitus ([facility] )     Hepatitis C     Myocardial infarction [facility])          Past Surgical History:  History reviewed  No pertinent surgical history  Family History:  Family History   Problem Relation Age of Onset    Heart attack Mother     Diabetes Mother     Colon cancer Father     Diabetes Father        Social History:   reports that he has been smoking  He has never used smokeless tobacco  He reports that he drinks about 21 0 standard drinks of alcohol per week  He reports that he has current or past drug history  Drug: Marijuana  I have reviewed the past medical history, surgical history, social and family history, current medications, allergies vitals, review of systems, and updated this information as appropriate today  Objective:    Physical Exam    Neurological Exam    GENERAL:  Cooperative in no acute distress  Well-developed and well-nourished    HEAD and NECK   Head is atraumatic normocephalic with no lesions or masses  Neck is supple with full range of motion    CARDIOVASCULAR  Carotid Arteries-no carotid bruits  NEUROLOGIC:  Mental Status-the patient is awake alert and oriented without aphasia or apraxia immediate recall is 3/3, delayed recall is 2/3, he can tell me the month ER president,[facility], he is able to follow 2 and 3 step commands  Cranial Nerves: Visual fields are full to confrontation  Extraocular movements are full without nystagmus  Pupils are 2-1/2 mm and reactive  Face is symmetrical to light touch  Movements of facial expression move symmetrically  Hearing is normal to finger rub bilaterally  Soft palate lifts symmetrically  Shoulder shrug is symmetrical  Tongue is midline without atrophy    Motor: No drift is noted on arm extension  Strength is full in the upper and lower extremities with normal bulk and tone  Sensory:  Decreased light touch pinprick temperature sensation Cortical function is intact  Coordination: Finger to nose testing is performed accurately  Romberg is negative  Gait reveals a normal base with symmetrical arm swing  Tandem walk is normal   Reflexes:    2+ and symmetrical   Toes are downgoing          ROS:  Review of Systems   Constitutional: Negative  Negative for appetite change and fever  HENT: Negative  Negative for hearing loss, tinnitus, trouble swallowing and voice change  Eyes: Negative  Negative for photophobia and pain  Respiratory: Negative  Negative for shortness of breath  Cardiovascular: Negative  Negative for palpitations  Gastrointestinal: Negative  Negative for nausea and vomiting  Endocrine: Negative  Negative for cold intolerance and heat intolerance  Genitourinary: Negative  Negative for dysuria, frequency and urgency  Musculoskeletal: Negative  Negative for myalgias and neck pain  Skin: Negative  Negative for rash  Neurological: Negative  Negative for dizziness, tremors, seizures, syncope, facial asymmetry, speech difficulty, weakness, light-headedness, numbness and headaches  Hematological: Negative  Does not bruise/bleed easily  Psychiatric/Behavioral: Negative  Negative for confusion, hallucinations and sleep disturbance

## 2019-11-26 ENCOUNTER — OFFICE VISIT (OUTPATIENT)
Dept: INTERNAL MEDICINE CLINIC | Facility: CLINIC | Age: 74
End: 2019-11-26
Payer: COMMERCIAL

## 2019-11-26 ENCOUNTER — APPOINTMENT (OUTPATIENT)
Dept: LAB | Facility: CLINIC | Age: 74
End: 2019-11-26
Payer: COMMERCIAL

## 2019-11-26 VITALS
TEMPERATURE: 97.5 F | DIASTOLIC BLOOD PRESSURE: 80 MMHG | WEIGHT: 155.6 LBS | BODY MASS INDEX: 25.92 KG/M2 | OXYGEN SATURATION: 98 % | RESPIRATION RATE: 18 BRPM | SYSTOLIC BLOOD PRESSURE: 120 MMHG | HEIGHT: 65 IN | HEART RATE: 78 BPM

## 2019-11-26 DIAGNOSIS — E78.2 MIXED HYPERLIPIDEMIA: ICD-10-CM

## 2019-11-26 DIAGNOSIS — B18.2 CHRONIC HEPATITIS C WITHOUT HEPATIC COMA (HCC): ICD-10-CM

## 2019-11-26 DIAGNOSIS — I73.9 PERIPHERAL VASCULAR DISEASE (HCC): ICD-10-CM

## 2019-11-26 DIAGNOSIS — I25.10 CORONARY ARTERY DISEASE INVOLVING NATIVE CORONARY ARTERY OF NATIVE HEART WITHOUT ANGINA PECTORIS: ICD-10-CM

## 2019-11-26 DIAGNOSIS — R41.82 ALTERED MENTAL STATUS: ICD-10-CM

## 2019-11-26 DIAGNOSIS — E11.42 TYPE 2 DIABETES MELLITUS WITH DIABETIC POLYNEUROPATHY, WITHOUT LONG-TERM CURRENT USE OF INSULIN (HCC): Primary | ICD-10-CM

## 2019-11-26 DIAGNOSIS — I10 ESSENTIAL HYPERTENSION: ICD-10-CM

## 2019-11-26 DIAGNOSIS — I87.2 VENOUS INSUFFICIENCY: ICD-10-CM

## 2019-11-26 DIAGNOSIS — F32.89 OTHER DEPRESSION: ICD-10-CM

## 2019-11-26 LAB
CREAT UR-MCNC: 176 MG/DL
MICROALBUMIN UR-MCNC: 5.8 MG/L (ref 0–20)
MICROALBUMIN/CREAT 24H UR: 3 MG/G CREATININE (ref 0–30)

## 2019-11-26 PROCEDURE — 3061F NEG MICROALBUMINURIA REV: CPT | Performed by: NURSE PRACTITIONER

## 2019-11-26 PROCEDURE — 82043 UR ALBUMIN QUANTITATIVE: CPT | Performed by: NURSE PRACTITIONER

## 2019-11-26 PROCEDURE — 82570 ASSAY OF URINE CREATININE: CPT | Performed by: NURSE PRACTITIONER

## 2019-11-26 PROCEDURE — 3725F SCREEN DEPRESSION PERFORMED: CPT | Performed by: NURSE PRACTITIONER

## 2019-11-26 PROCEDURE — 4010F ACE/ARB THERAPY RXD/TAKEN: CPT | Performed by: NURSE PRACTITIONER

## 2019-11-26 PROCEDURE — 99205 OFFICE O/P NEW HI 60 MIN: CPT | Performed by: NURSE PRACTITIONER

## 2019-11-26 PROCEDURE — 1160F RVW MEDS BY RX/DR IN RCRD: CPT | Performed by: NURSE PRACTITIONER

## 2019-11-26 RX ORDER — CLOPIDOGREL BISULFATE 75 MG/1
75 TABLET ORAL DAILY
Qty: 90 TABLET | Refills: 0 | Status: SHIPPED | OUTPATIENT
Start: 2019-11-26

## 2019-11-26 RX ORDER — MIRTAZAPINE 15 MG/1
15 TABLET, FILM COATED ORAL
Qty: 30 TABLET | Refills: 2 | Status: SHIPPED | OUTPATIENT
Start: 2019-11-26

## 2019-11-26 RX ORDER — ATORVASTATIN CALCIUM 20 MG/1
20 TABLET, FILM COATED ORAL DAILY
Qty: 90 TABLET | Refills: 0 | Status: SHIPPED | OUTPATIENT
Start: 2019-11-26

## 2019-11-26 RX ORDER — MINERAL OIL 100 MG/100ML
OIL ORAL; TOPICAL DAILY
Qty: 1 DEVICE | Refills: 0 | Status: SHIPPED | OUTPATIENT
Start: 2019-11-26

## 2019-11-26 RX ORDER — BLOOD-GLUCOSE METER
1 KIT MISCELLANEOUS DAILY
Qty: 1 EACH | Refills: 0 | Status: SHIPPED | OUTPATIENT
Start: 2019-11-26

## 2019-11-26 RX ORDER — LANCETS 28 GAUGE
EACH MISCELLANEOUS
Qty: 100 EACH | Refills: 0 | Status: SHIPPED | OUTPATIENT
Start: 2019-11-26

## 2019-11-26 RX ORDER — LISINOPRIL 20 MG/1
20 TABLET ORAL DAILY
Qty: 90 TABLET | Refills: 0 | Status: SHIPPED | OUTPATIENT
Start: 2019-11-26

## 2019-11-26 RX ORDER — GABAPENTIN 100 MG/1
100 CAPSULE ORAL 3 TIMES DAILY
Qty: 90 CAPSULE | Refills: 0 | Status: SHIPPED | OUTPATIENT
Start: 2019-11-26 | End: 2020-04-27 | Stop reason: SDUPTHER

## 2019-11-26 NOTE — ASSESSMENT & PLAN NOTE
Not sure of exact cardiac history, patient is a poor historian  Will attempt to get records from previous PCP/cardiologist  Per patient he has a history of 7 stents in his heart   On plavix   Referral given today for cardiology

## 2019-11-26 NOTE — ASSESSMENT & PLAN NOTE
Lab Results   Component Value Date    HGBA1C 6 9 (H) 10/18/2019   well controlled  Continue Janumet  Urine micro alb/foot exam done today   Referral given to ophthalmology   Script for glucometer given today

## 2019-11-26 NOTE — ASSESSMENT & PLAN NOTE
Referral given today for vascular evaluation, patient has history of bypass, LE stents in the past, exact history is unclear

## 2019-11-26 NOTE — PROGRESS NOTES
Assessment/Plan:    Chronic hepatitis C ([facility] )  Referral given for GI     Type 2 diabetes mellitus with diabetic polyneuropathy, without long-term current use of insulin ([facility] )    Lab Results   Component Value Date    HGBA1C 6 9 (H) 10/18/2019   well controlled  Continue Janumet  Urine micro alb/foot exam done today   Referral given to ophthalmology   Script for glucometer given today     Coronary artery disease involving native coronary artery of native heart without angina pectoris  Not sure of exact cardiac history, patient is a poor historian  Will attempt to get records from previous PCP/cardiologist  Per patient he has a history of 7 stents in his heart   On plavix   Referral given today for cardiology     Essential hypertension  Well controlled  Continue lisinopril     Peripheral vascular disease ([place] [address] )  Referral given today for vascular evaluation, patient has history of bypass, LE stents in the past, exact history is unclear    Depression  Stable  On remeron        Diagnoses and all orders for this visit:    Type 2 diabetes mellitus with diabetic polyneuropathy, without long-term current use of insulin ([place] [address] )  -     Comprehensive metabolic panel; Future  -     Hemoglobin A1C; Future  -     Ambulatory referral to Ophthalmology; Future  -     glucose monitoring kit (FREESTYLE) monitoring kit; 1 each by Does not apply route daily  -     Lancets (FREESTYLE) lancets; Use as instructed  -     glucose blood test strip; Use as instructed  -     sitaGLIPtin-metFORMIN (JANUMET)  MG per tablet; Take 1 tablet by mouth 2 (two) times a day with meals  -     gabapentin (NEURONTIN) 100 mg capsule; Take 1 capsule (100 mg total) by mouth 3 (three) times a day  -     Microalbumin / creatinine urine ratio    Essential hypertension  -     Blood Pressure Monitor MARIELLA; by Does not apply route daily  -     lisinopril (ZESTRIL) 20 mg tablet;  Take 1 tablet (20 mg total) by mouth daily    Mixed hyperlipidemia  -     Lipid Panel with Direct LDL reflex; Future  -     atorvastatin (LIPITOR) 20 mg tablet; Take 1 tablet (20 mg total) by mouth daily    Chronic hepatitis C without hepatic coma ([facility] )  -     Ambulatory referral to Gastroenterology; Future  -     CBC and differential; Future    Coronary artery disease involving native coronary artery of native heart without angina pectoris  -     Ambulatory referral to Cardiology; Future    Peripheral vascular disease ([facility] )    Venous insufficiency  -     Ambulatory referral to Vascular Surgery; Future    Other depression  -     mirtazapine (REMERON) 15 mg tablet; Take 1 tablet (15 mg total) by mouth daily at bedtime    Altered mental status  -     clopidogrel (PLAVIX) 75 mg tablet; Take 1 tablet (75 mg total) by mouth daily          Subjective:      Patient ID: Lenora Rodríguez is a 76 y o  male  Here today to establish care  Recently moved here from [place], [place]  He was recently in the hospital for alcohol intoxication/acute encephalopathy   He followed up with neurology yesterday, EEG and MRI were essentially normal   He was advised to continue plavix but stop aspirin   His medical history includes diabetes, hypertension, hyperlipidemia, peripheral vascular disease with LE stents, MI with cardiac stents, and hepatitis C   He is a poor historian and his exact medical history/timing is unclear   He smokes 1 ppd and has a history of alcoholism but states he is mostly sober right now, states last drink was 2 weeks ago? His last colonoscopy was 2016 which he gets every few years due to his father dying of colon cancer                   The following portions of the patient's history were reviewed and updated as appropriate: allergies, current medications, past family history, past medical history, past social history, past surgical history and problem list     Review of Systems   Constitutional: Negative for activity change, appetite change, fatigue, fever and unexpected weight change     Eyes: Negative for visual disturbance  Respiratory: Negative for cough, chest tightness, shortness of breath and wheezing  Cardiovascular: Negative for chest pain, palpitations and leg swelling  Gastrointestinal: Negative for abdominal pain, blood in stool, constipation and diarrhea  Genitourinary: Negative for difficulty urinating  Musculoskeletal: Negative for arthralgias  Skin: Negative for rash  Neurological: Negative for dizziness, weakness, light-headedness and headaches  Psychiatric/Behavioral: Negative for sleep disturbance  Objective:      /80   Pulse 78   Temp 97 5 °F (36 4 °C)   Resp 18   Ht 5' 5" (1 651 m)   Wt 70 6 kg (155 lb 9 6 oz)   SpO2 98%   BMI 25 89 kg/m²          Physical Exam   Constitutional: He is oriented to person, place, and time  He appears well-developed and well-nourished  HENT:   Head: Normocephalic and atraumatic  Mouth/Throat: Oropharynx is clear and moist    Eyes: Pupils are equal, round, and reactive to light  Conjunctivae are normal    Neck: No thyromegaly present  Cardiovascular: Normal rate, regular rhythm, normal heart sounds and intact distal pulses  Pulmonary/Chest: Effort normal and breath sounds normal    Abdominal: Soft  Bowel sounds are normal    Musculoskeletal: Normal range of motion  He exhibits no edema  Lymphadenopathy:     He has no cervical adenopathy  Neurological: He is alert and oriented to person, place, and time  Skin: Skin is warm and dry  Psychiatric: He has a normal mood and affect  His behavior is normal    Vitals reviewed  BMI Counseling: Body mass index is 25 89 kg/m²  The BMI is above normal  Nutrition recommendations include reducing portion sizes, decreasing overall calorie intake, consuming healthier snacks, decreasing soda and/or juice intake, moderation in carbohydrate intake and increasing intake of lean protein  Exercise recommendations include exercising 3-5 times per week

## 2020-04-27 DIAGNOSIS — E11.42 TYPE 2 DIABETES MELLITUS WITH DIABETIC POLYNEUROPATHY, WITHOUT LONG-TERM CURRENT USE OF INSULIN (HCC): ICD-10-CM

## 2020-04-28 RX ORDER — GABAPENTIN 100 MG/1
100 CAPSULE ORAL 3 TIMES DAILY
Qty: 90 CAPSULE | Refills: 0 | Status: SHIPPED | OUTPATIENT
Start: 2020-04-28 | End: 2020-06-01 | Stop reason: SDUPTHER

## 2020-06-01 DIAGNOSIS — E11.42 TYPE 2 DIABETES MELLITUS WITH DIABETIC POLYNEUROPATHY, WITHOUT LONG-TERM CURRENT USE OF INSULIN (HCC): ICD-10-CM

## 2020-06-02 RX ORDER — GABAPENTIN 100 MG/1
100 CAPSULE ORAL 3 TIMES DAILY
Qty: 90 CAPSULE | Refills: 0 | Status: SHIPPED | OUTPATIENT
Start: 2020-06-02 | End: 2020-08-05 | Stop reason: SDUPTHER

## 2020-08-05 DIAGNOSIS — E11.42 TYPE 2 DIABETES MELLITUS WITH DIABETIC POLYNEUROPATHY, WITHOUT LONG-TERM CURRENT USE OF INSULIN (HCC): ICD-10-CM

## 2020-08-05 RX ORDER — GABAPENTIN 100 MG/1
100 CAPSULE ORAL 3 TIMES DAILY
Qty: 90 CAPSULE | Refills: 0 | Status: SHIPPED | OUTPATIENT
Start: 2020-08-05 | End: 2020-09-16

## 2020-09-14 DIAGNOSIS — E11.42 TYPE 2 DIABETES MELLITUS WITH DIABETIC POLYNEUROPATHY, WITHOUT LONG-TERM CURRENT USE OF INSULIN (HCC): ICD-10-CM

## 2020-09-16 RX ORDER — GABAPENTIN 100 MG/1
CAPSULE ORAL
Qty: 90 CAPSULE | Refills: 0 | Status: SHIPPED | OUTPATIENT
Start: 2020-09-16 | End: 2020-10-22

## 2020-09-16 NOTE — TELEPHONE ENCOUNTER
Wife states Pt is currently in the hospital ( [facility] ) - do not see in chart   States he was admitted 2 days ago and is going to be having a EDG done

## 2020-09-25 ENCOUNTER — APPOINTMENT (EMERGENCY)
Dept: RADIOLOGY | Facility: HOSPITAL | Age: 75
End: 2020-09-25
Payer: COMMERCIAL

## 2020-09-25 ENCOUNTER — APPOINTMENT (EMERGENCY)
Dept: CT IMAGING | Facility: HOSPITAL | Age: 75
End: 2020-09-25
Payer: COMMERCIAL

## 2020-09-25 ENCOUNTER — HOSPITAL ENCOUNTER (EMERGENCY)
Facility: HOSPITAL | Age: 75
Discharge: HOME | End: 2020-09-26
Attending: EMERGENCY MEDICINE | Admitting: EMERGENCY MEDICINE
Payer: COMMERCIAL

## 2020-09-25 DIAGNOSIS — K29.70 GASTRITIS: Primary | ICD-10-CM

## 2020-09-25 DIAGNOSIS — I10 HYPERTENSION: ICD-10-CM

## 2020-09-25 LAB
ALBUMIN SERPL BCP-MCNC: 3.9 G/DL (ref 3.5–5)
ALP SERPL-CCNC: 67 U/L (ref 46–116)
ALT SERPL W P-5'-P-CCNC: 27 U/L (ref 12–78)
ANION GAP SERPL CALCULATED.3IONS-SCNC: 7 MMOL/L (ref 4–13)
AST SERPL W P-5'-P-CCNC: 16 U/L (ref 5–45)
BASOPHILS # BLD AUTO: 0.02 THOUSANDS/ΜL (ref 0–0.1)
BASOPHILS NFR BLD AUTO: 0 % (ref 0–1)
BILIRUB SERPL-MCNC: 0.6 MG/DL (ref 0.2–1)
BUN SERPL-MCNC: 10 MG/DL (ref 5–25)
CALCIUM SERPL-MCNC: 9.7 MG/DL (ref 8.3–10.1)
CHLORIDE SERPL-SCNC: 96 MMOL/L (ref 100–108)
CO2 SERPL-SCNC: 29 MMOL/L (ref 21–32)
CREAT SERPL-MCNC: 1.03 MG/DL (ref 0.6–1.3)
EOSINOPHIL # BLD AUTO: 0.07 THOUSAND/ΜL (ref 0–0.61)
EOSINOPHIL NFR BLD AUTO: 1 % (ref 0–6)
ERYTHROCYTE [DISTWIDTH] IN BLOOD BY AUTOMATED COUNT: 13.2 % (ref 11.6–15.1)
GFR SERPL CREATININE-BSD FRML MDRD: 71 ML/MIN/1.73SQ M
GLUCOSE SERPL-MCNC: 169 MG/DL (ref 65–140)
HCT VFR BLD AUTO: 38.6 % (ref 36.5–49.3)
HGB BLD-MCNC: 13.2 G/DL (ref 12–17)
IMM GRANULOCYTES # BLD AUTO: 0.03 THOUSAND/UL (ref 0–0.2)
IMM GRANULOCYTES NFR BLD AUTO: 0 % (ref 0–2)
LIPASE SERPL-CCNC: 329 U/L (ref 73–393)
LYMPHOCYTES # BLD AUTO: 1.93 THOUSANDS/ΜL (ref 0.6–4.47)
LYMPHOCYTES NFR BLD AUTO: 24 % (ref 14–44)
MCH RBC QN AUTO: 32.3 PG (ref 26.8–34.3)
MCHC RBC AUTO-ENTMCNC: 34.2 G/DL (ref 31.4–37.4)
MCV RBC AUTO: 94 FL (ref 82–98)
MONOCYTES # BLD AUTO: 0.65 THOUSAND/ΜL (ref 0.17–1.22)
MONOCYTES NFR BLD AUTO: 8 % (ref 4–12)
NEUTROPHILS # BLD AUTO: 5.41 THOUSANDS/ΜL (ref 1.85–7.62)
NEUTS SEG NFR BLD AUTO: 67 % (ref 43–75)
NRBC BLD AUTO-RTO: 0 /100 WBCS
PLATELET # BLD AUTO: 207 THOUSANDS/UL (ref 149–390)
PMV BLD AUTO: 9.5 FL (ref 8.9–12.7)
POTASSIUM SERPL-SCNC: 4 MMOL/L (ref 3.5–5.3)
PROT SERPL-MCNC: 8 G/DL (ref 6.4–8.2)
RBC # BLD AUTO: 4.09 MILLION/UL (ref 3.88–5.62)
SODIUM SERPL-SCNC: 132 MMOL/L (ref 136–145)
TROPONIN I SERPL-MCNC: <0.02 NG/ML
TROPONIN I SERPL-MCNC: <0.02 NG/ML
WBC # BLD AUTO: 8.11 THOUSAND/UL (ref 4.31–10.16)

## 2020-09-25 PROCEDURE — C9113 INJ PANTOPRAZOLE SODIUM, VIA: HCPCS | Performed by: PHYSICIAN ASSISTANT

## 2020-09-25 PROCEDURE — 84484 ASSAY OF TROPONIN QUANT: CPT | Performed by: EMERGENCY MEDICINE

## 2020-09-25 PROCEDURE — 80053 COMPREHEN METABOLIC PANEL: CPT | Performed by: EMERGENCY MEDICINE

## 2020-09-25 PROCEDURE — 36415 COLL VENOUS BLD VENIPUNCTURE: CPT

## 2020-09-25 PROCEDURE — G1004 CDSM NDSC: HCPCS

## 2020-09-25 PROCEDURE — 93005 ELECTROCARDIOGRAM TRACING: CPT

## 2020-09-25 PROCEDURE — 71045 X-RAY EXAM CHEST 1 VIEW: CPT

## 2020-09-25 PROCEDURE — 96374 THER/PROPH/DIAG INJ IV PUSH: CPT

## 2020-09-25 PROCEDURE — 99285 EMERGENCY DEPT VISIT HI MDM: CPT

## 2020-09-25 PROCEDURE — 83690 ASSAY OF LIPASE: CPT | Performed by: EMERGENCY MEDICINE

## 2020-09-25 PROCEDURE — 84484 ASSAY OF TROPONIN QUANT: CPT | Performed by: PHYSICIAN ASSISTANT

## 2020-09-25 PROCEDURE — 74177 CT ABD & PELVIS W/CONTRAST: CPT

## 2020-09-25 PROCEDURE — 85025 COMPLETE CBC W/AUTO DIFF WBC: CPT | Performed by: EMERGENCY MEDICINE

## 2020-09-25 PROCEDURE — 99285 EMERGENCY DEPT VISIT HI MDM: CPT | Performed by: PHYSICIAN ASSISTANT

## 2020-09-25 RX ORDER — PANTOPRAZOLE SODIUM 40 MG/1
40 TABLET, DELAYED RELEASE ORAL 2 TIMES DAILY
Qty: 28 TABLET | Refills: 0 | Status: SHIPPED | OUTPATIENT
Start: 2020-09-25 | End: 2020-10-09

## 2020-09-25 RX ORDER — MAGNESIUM HYDROXIDE/ALUMINUM HYDROXICE/SIMETHICONE 120; 1200; 1200 MG/30ML; MG/30ML; MG/30ML
15 SUSPENSION ORAL ONCE
Status: COMPLETED | OUTPATIENT
Start: 2020-09-25 | End: 2020-09-25

## 2020-09-25 RX ORDER — PANTOPRAZOLE SODIUM 40 MG/1
40 INJECTION, POWDER, FOR SOLUTION INTRAVENOUS ONCE
Status: COMPLETED | OUTPATIENT
Start: 2020-09-25 | End: 2020-09-25

## 2020-09-25 RX ORDER — SUCRALFATE ORAL 1 G/10ML
1 SUSPENSION ORAL 4 TIMES DAILY
Qty: 420 ML | Refills: 0 | Status: SHIPPED | OUTPATIENT
Start: 2020-09-25

## 2020-09-25 RX ORDER — LIDOCAINE HYDROCHLORIDE 20 MG/ML
10 SOLUTION OROPHARYNGEAL ONCE
Status: COMPLETED | OUTPATIENT
Start: 2020-09-25 | End: 2020-09-25

## 2020-09-25 RX ORDER — FAMOTIDINE 20 MG/1
20 TABLET, FILM COATED ORAL ONCE
Status: COMPLETED | OUTPATIENT
Start: 2020-09-25 | End: 2020-09-25

## 2020-09-25 RX ADMIN — PANTOPRAZOLE SODIUM 40 MG: 40 INJECTION, POWDER, FOR SOLUTION INTRAVENOUS at 23:19

## 2020-09-25 RX ADMIN — IOHEXOL 100 ML: 350 INJECTION, SOLUTION INTRAVENOUS at 21:53

## 2020-09-25 RX ADMIN — LIDOCAINE HYDROCHLORIDE 10 ML: 20 SOLUTION ORAL; TOPICAL at 23:19

## 2020-09-25 RX ADMIN — ALUMINUM HYDROXIDE, MAGNESIUM HYDROXIDE, AND SIMETHICONE 15 ML: 200; 200; 20 SUSPENSION ORAL at 23:18

## 2020-09-25 RX ADMIN — FAMOTIDINE 20 MG: 20 TABLET ORAL at 23:19

## 2020-09-26 VITALS
TEMPERATURE: 98.3 F | BODY MASS INDEX: 26.48 KG/M2 | RESPIRATION RATE: 20 BRPM | OXYGEN SATURATION: 99 % | SYSTOLIC BLOOD PRESSURE: 168 MMHG | WEIGHT: 158.95 LBS | HEART RATE: 62 BPM | HEIGHT: 65 IN | DIASTOLIC BLOOD PRESSURE: 70 MMHG

## 2020-09-26 LAB
ATRIAL RATE: 64 BPM
ATRIAL RATE: 67 BPM
P AXIS: 51 DEGREES
P AXIS: 84 DEGREES
PR INTERVAL: 146 MS
PR INTERVAL: 154 MS
QRS AXIS: 62 DEGREES
QRS AXIS: 65 DEGREES
QRSD INTERVAL: 82 MS
QRSD INTERVAL: 86 MS
QT INTERVAL: 390 MS
QT INTERVAL: 406 MS
QTC INTERVAL: 412 MS
QTC INTERVAL: 418 MS
T WAVE AXIS: 58 DEGREES
T WAVE AXIS: 60 DEGREES
VENTRICULAR RATE: 64 BPM
VENTRICULAR RATE: 67 BPM

## 2020-09-26 PROCEDURE — 93010 ELECTROCARDIOGRAM REPORT: CPT | Performed by: INTERNAL MEDICINE

## 2020-09-26 NOTE — ED NOTES
Patient transported to [facility], [address] [place] [address]  09/25/20 5502

## 2020-09-26 NOTE — ED NOTES
Pt resting comfortably with family at [facility]  09/25/20 2102

## 2020-09-26 NOTE — ED PROVIDER NOTES
History  Chief Complaint   Patient presents with    Chest Pain     w/ abdominal pain x4 days denies n/v/d reports not being able to eat     24-year-old male with history of insulin-dependent type 2 diabetes, hypertension, hyperlipidemia, coronary artery disease, and chronic hepatitis presenting for evaluation of epigastric abdominal pain that has been ongoing for the past 4 days  Pain started after drinking alcohol  He describes his pain as a burning sensation  Pain radiates upwards to his chest  Pain is worse with eating and patient states he has a very poor appetite due to his symptoms  He denies shortness of breath, diaphoresis, dizziness, vomiting, diarrhea, fevers  Patient has a remote history of ulcers several decades ago  History provided by:  Patient, medical records and spouse   used: No    Epigastric Pain   Pain location:  Epigastric  Pain quality: burning    Pain radiates to:  Precordial region  Pain radiates to the back: no    Pain severity:  Moderate  Onset quality:  Gradual  Duration:  4 days  Timing:  Constant  Progression:  Unchanged  Chronicity:  New  Context: eating    Relieved by:  Nothing  Exacerbated by: Eating  Ineffective treatments:  None tried  Associated symptoms: abdominal pain, anorexia and heartburn    Associated symptoms: no altered mental status, no anxiety, no back pain, no claudication, no cough, no diaphoresis, no dizziness, no dysphagia, no fatigue, no fever, no lower extremity edema, no near-syncope, no palpitations, no shortness of breath, no syncope and not vomiting        Prior to Admission Medications   Prescriptions Last Dose Informant Patient Reported? Taking?    Blood Pressure Monitor MARIELLA   No No   Sig: by Does not apply route daily   Lancets (FREESTYLE) lancets   No No   Sig: Use as instructed   aspirin 81 mg chewable tablet  Self Yes No   Sig: Chew 81 mg daily   atorvastatin (LIPITOR) 20 mg tablet   No No   Sig: Take 1 tablet (20 mg total) by mouth daily   clopidogrel (PLAVIX) 75 mg tablet   No No   Sig: Take 1 tablet (75 mg total) by mouth daily   ferrous sulfate 325 (65 Fe) mg tablet  Self Yes No   Sig: Take 325 mg by mouth daily with breakfast   folic acid (FOLVITE) 1 mg tablet  Self No No   Sig: Take 1 tablet (1 mg total) by mouth daily   gabapentin (NEURONTIN) 100 mg capsule   No No   Sig: take 1 capsule by mouth three times a day   glucose blood test strip   No No   Sig: Use as instructed   glucose monitoring kit (FREESTYLE) monitoring kit   No No   Si each by Does not apply route daily   lisinopril (ZESTRIL) 20 mg tablet   No No   Sig: Take 1 tablet (20 mg total) by mouth daily   mirtazapine (REMERON) 15 mg tablet   No No   Sig: Take 1 tablet (15 mg total) by mouth daily at bedtime   omega-3-acid ethyl esters (LOVAZA) 1 g capsule  Self Yes No   Sig: Take 2 capsules by mouth 2 (two) times a day   pregabalin (LYRICA) 300 MG capsule  Self Yes No   Sig: Take 300 mg by mouth 3 (three) times a day   sitaGLIPtin-metFORMIN (JANUMET)  MG per tablet   No No   Sig: Take 1 tablet by mouth 2 (two) times a day with meals   thiamine 100 MG tablet  Self No No   Sig: Take 1 tablet (100 mg total) by mouth daily      Facility-Administered Medications: None       Past Medical History:   Diagnosis Date    Chest pain     [facility] ) 3/15/2013    Diabetes mellitus (HCC)     Heartburn     Hepatitis C     Indigestion     Liver disease     Myocardial infarction ([facility] )     Seizures ([facility] )        History reviewed  No pertinent surgical history  Family History   Problem Relation Age of Onset    Heart attack Mother     Diabetes Mother     Colon cancer Father     Diabetes Father     Alcohol abuse Neg Hx     Substance Abuse Neg Hx     Mental illness Neg Hx     Depression Neg Hx      I have reviewed and agree with the history as documented      E-Cigarette/Vaping     E-Cigarette/Vaping Substances     Social History     Tobacco Use    Smoking status: Current Every Day Smoker    Smokeless tobacco: Never Used   Substance Use Topics    Alcohol use: Yes     Alcohol/week: 21 0 standard drinks     Types: 21 Cans of beer per week     Frequency: 4 or more times a week     Drinks per session: 3 or 4    Drug use: Not Currently     Types: Marijuana       Review of Systems   Constitutional: Negative for chills, diaphoresis, fatigue and fever  HENT: Negative for congestion, drooling and trouble swallowing  Eyes: Negative for discharge and redness  Respiratory: Negative for cough and shortness of breath  Cardiovascular: Positive for chest pain  Negative for palpitations, claudication, leg swelling, syncope and near-syncope  Gastrointestinal: Positive for abdominal pain, anorexia and heartburn  Negative for abdominal distention, diarrhea and vomiting  Genitourinary: Negative for difficulty urinating and dysuria  Musculoskeletal: Negative for back pain, neck pain and neck stiffness  Skin: Negative for color change and rash  Neurological: Negative for dizziness and seizures  Psychiatric/Behavioral: Negative for confusion  The patient is not nervous/anxious  Physical Exam  Physical Exam  Vitals signs and nursing note reviewed  Constitutional:       General: He is not in acute distress  Appearance: He is well-developed  He is not diaphoretic  Comments: Non-toxic appearing   HENT:      Head: Normocephalic and atraumatic  Right Ear: External ear normal       Left Ear: External ear normal    Eyes:      General: No scleral icterus  Right eye: No discharge  Left eye: No discharge  Conjunctiva/sclera: Conjunctivae normal    Neck:      Musculoskeletal: Normal range of motion and neck supple  Cardiovascular:      Rate and Rhythm: Normal rate and regular rhythm  Pulses:           Radial pulses are 2+ on the right side and 2+ on the left side  Heart sounds: Normal heart sounds  No murmur  Pulmonary:      Effort: Pulmonary effort is normal  No respiratory distress  Breath sounds: Normal breath sounds  No stridor  No wheezing or rales  Abdominal:      General: Bowel sounds are normal  There is no distension  Palpations: Abdomen is soft  Tenderness: There is abdominal tenderness in the epigastric area  There is no guarding  Musculoskeletal: Normal range of motion  General: No deformity  Right lower leg: No edema  Left lower leg: No edema  Skin:     General: Skin is warm and dry  Neurological:      Mental Status: He is alert  He is not disoriented  GCS: GCS eye subscore is 4  GCS verbal subscore is 5  GCS motor subscore is 6     Psychiatric:         Behavior: Behavior normal          Vital Signs  ED Triage Vitals [09/25/20 2018]   Temperature Pulse Respirations Blood Pressure SpO2   98 3 °F (36 8 °C) 66 20 (!) 183/84 99 %      Temp Source Heart Rate Source Patient Position - Orthostatic VS BP Location FiO2 (%)   Oral Monitor Sitting Right arm --      Pain Score       8           Vitals:    09/25/20 2139 09/25/20 2145 09/25/20 2245 09/25/20 2345   BP: (!) 186/77 (!) 186/77 (!) 178/76 168/70   Pulse: 60 62 61 62   Patient Position - Orthostatic VS: Lying Lying Lying Lying         Visual Acuity      ED Medications  Medications   iohexol (OMNIPAQUE) 350 MG/ML injection (MULTI-DOSE) 100 mL (100 mL Intravenous Given 9/25/20 2153)   Lidocaine Viscous HCl (XYLOCAINE) 2 % mucosal solution 10 mL (10 mL Swish & Swallow Given 9/25/20 2319)   aluminum-magnesium hydroxide-simethicone (MYLANTA) 200-200-20 mg/5 mL oral suspension 15 mL (15 mL Oral Given 9/25/20 2318)   famotidine (PEPCID) tablet 20 mg (20 mg Oral Given 9/25/20 2319)   pantoprazole (PROTONIX) injection 40 mg (40 mg Intravenous Given 9/25/20 2319)       Diagnostic Studies  Results Reviewed     Procedure Component Value Units Date/Time    Troponin I [[id number]]  (Normal) Collected:  09/25/20 2316    Lab Status:  Final result Specimen:  Blood from Arm, Left Updated:  09/25/20 2345     Troponin I <0 02 ng/mL     Troponin I [[id number]]  (Normal) Collected:  09/25/20 2024    Lab Status:  Final result Specimen:  Blood from Arm, Left Updated:  09/25/20 2050     Troponin I <0 02 ng/mL     Comprehensive metabolic panel [[id number]]  (Abnormal) Collected:  09/25/20 2024    Lab Status:  Final result Specimen:  Blood from Arm, Left Updated:  09/25/20 2048     Sodium 132 mmol/L      Potassium 4 0 mmol/L      Chloride 96 mmol/L      CO2 29 mmol/L      ANION GAP 7 mmol/L      BUN 10 mg/dL      Creatinine 1 03 mg/dL      Glucose 169 mg/dL      Calcium 9 7 mg/dL      AST 16 U/L      ALT 27 U/L      Alkaline Phosphatase 67 U/L      Total Protein 8 0 g/dL      Albumin 3 9 g/dL      Total Bilirubin 0 60 mg/dL      eGFR 71 ml/min/1 73sq m     Narrative:       [place] guidelines for Chronic Kidney Disease (CKD):     Stage 1 with normal or high GFR (GFR > 90 mL/min/1 73 square meters)    Stage 2 Mild CKD (GFR = 60-89 mL/min/1 73 square meters)    Stage 3A Moderate CKD (GFR = 45-59 mL/min/1 73 square meters)    Stage 3B Moderate CKD (GFR = 30-44 mL/min/1 73 square meters)    Stage 4 Severe CKD (GFR = 15-29 mL/min/1 73 square meters)    Stage 5 End Stage CKD (GFR <15 mL/min/1 73 square meters)  Note: GFR calculation is accurate only with a steady state creatinine    Lipase [[id number]]  (Normal) Collected:  09/25/20 2024    Lab Status:  Final result Specimen:  Blood from Arm, Left Updated:  09/25/20 2048     Lipase 329 u/L     CBC and differential [[id number]] Collected:  09/25/20 2024    Lab Status:  Final result Specimen:  Blood from Arm, Left Updated:  09/25/20 2031     WBC 8 11 Thousand/uL      RBC 4 09 Million/uL      Hemoglobin 13 2 g/dL      Hematocrit 38 6 %      MCV 94 fL      MCH 32 3 pg      MCHC 34 2 g/dL      RDW 13 2 %      MPV 9 5 fL      Platelets 452 Thousands/uL      nRBC 0 /100 WBCs Neutrophils Relative 67 %      Immat GRANS % 0 %      Lymphocytes Relative 24 %      Monocytes Relative 8 %      Eosinophils Relative 1 %      Basophils Relative 0 %      Neutrophils Absolute 5 41 Thousands/µL      Immature Grans Absolute 0 03 Thousand/uL      Lymphocytes Absolute 1 93 Thousands/µL      Monocytes Absolute 0 65 Thousand/µL      Eosinophils Absolute 0 07 Thousand/µL      Basophils Absolute 0 02 Thousands/µL                  CT abdomen pelvis with contrast   Final Result by Yoko Montano DO (09/25 2257)      Some thickening of the distal stomach and proximal duodenum wall nonspecific but consider gastroenteritis or peptic ulcer disease  Correlation with patient's symptoms recommended  Partially distended bladder  Mild circumferential bladder wall thickening noted, probably exaggerated by underdistention  Superimposed cystitis could be considered in the appropriate clinical setting  No bowel obstruction or acute process seen otherwise  Other findings as above              Workstation performed: [id number]         XR chest 1 view portable    (Results Pending)              Procedures  ECG 12 Lead Documentation Only    Date/Time: 9/26/2020 12:33 AM  Performed by: Yusef Figueredo PA-C  Authorized by: Yusef Figueredo PA-C     Indications / Diagnosis:  CP  ECG reviewed by me, the ED Provider: yes    Patient location:  ED  Previous ECG:     Previous ECG:  Compared to current    Comparison ECG info:  10/17/19    Similarity:  No change    Comparison to cardiac monitor: Yes    Interpretation:     Interpretation: normal    Rate:     ECG rate:  67    ECG rate assessment: normal    Rhythm:     Rhythm: sinus rhythm    Ectopy:     Ectopy: none    QRS:     QRS axis:  Normal    QRS intervals:  Normal  Conduction:     Conduction: normal    ST segments:     ST segments:  Normal  T waves:     T waves: normal               ED Course           HEART Risk Score      Most Recent Value   Heart Score Risk Calculator   History  0 Filed at: 09/25/2020 2138   ECG  0 Filed at: 09/25/2020 2138   Age  2 Filed at: 09/25/2020 2138   Risk Factors  2 Filed at: 09/25/2020 2138   Troponin  0 Filed at: 09/25/2020 2138   HEART Score  4 Filed at: 09/25/2020 2138                      SBIRT 22yo+      Most Recent Value   SBIRT (25 yo +)   In order to provide better care to our patients, we are screening all of our patients for alcohol and drug use  Would it be okay to ask you these screening questions? Yes Filed at: 09/25/2020 2025   Initial Alcohol Screen: US AUDIT-C    1  How often do you have a drink containing alcohol?  0 Filed at: 09/25/2020 2025   2  How many drinks containing alcohol do you have on a typical day you are drinking? 0 Filed at: 09/25/2020 2025   3a  Male UNDER 65: How often do you have five or more drinks on one occasion? 0 Filed at: 09/25/2020 2025   3b  FEMALE Any Age, or MALE 65+: How often do you have 4 or more drinks on one occassion? 0 Filed at: 09/25/2020 2025   Audit-C Score  0 Filed at: 09/25/2020 2025   MICHELLE: How many times in the past year have you    Used an illegal drug or used a prescription medication for non-medical reasons? Never Filed at: 09/25/2020 2025                  MDM  Number of Diagnoses or Management Options  Gastritis: new and requires workup  Hypertension: new and requires workup  Diagnosis management comments: 66yo male with a history of CAD and GERD presenting for epigastric pain x 4 days  Symptoms have been constant since drinking alcohol  Pain is worse with eating and is describes as burning  Pain radiates upward to chest  Patient is hypertensive with otherwise normal vital signs  Differential diagnosis includes but is not limited to: ACS, pneumonia, pneumothorax, pericarditis, GERD, gastritis, musculoskeletal, anxiety, PE, aortic dissection, pleuritis, esophagitis, referred pain, nonspecific chest pain       Initial ED plan: Check cardiac labs, lipase, EKG, CXR, and CT abdomen  Final assessment: Labs overall unremarkable  Lipase, LFTs, renal function normal  Troponin negative and EKG reveals NSR without ST changes  CT abdomen reveals thickening of stomach and proximal duodenum which may represent PUD  Imaging findings correlate with patient's symptoms  He was given a GI cocktail with improvement in symptoms  HEART score is 4 although clinically his pain is GI related  Delta troponin and EKG ordered which were unchanged  No indication for admission at this time  Scripts provided for Protonix and Carafate  Discussed avoidance of triggers with patient including avoidance of smoking, alcohol, spicy foods, tomatoes, etc  GI referral provided  ED return precautions discussed  Patient expressed understanding and is agreeable to plan  Patient discharged in stable condition  Amount and/or Complexity of Data Reviewed  Clinical lab tests: ordered and reviewed  Tests in the radiology section of CPT®: ordered and reviewed  Tests in the medicine section of CPT®: ordered and reviewed  Review and summarize past medical records: yes  Independent visualization of images, tracings, or specimens: yes    Risk of Complications, Morbidity, and/or Mortality  Presenting problems: moderate  Diagnostic procedures: moderate  Management options: moderate    Patient Progress  Patient progress: stable      Disposition  Final diagnoses:   Gastritis   Hypertension     Time reflects when diagnosis was documented in both MDM as applicable and the Disposition within this note     Time User Action Codes Description Comment    9/25/2020 11:46 PM [employer] [address] [place] [[address] [id number]] Gastritis     9/25/2020 11:51 PM [employer] [address]/ [address] Hypertension       ED Disposition     ED Disposition Condition Date/Time Comment    Discharge Stable Fri Sep 25, 2020 11:46 PM Lenny Ruiz discharge to home/self care              Follow-up Information     Follow up With Specialties Details Why Contact Info Additional [facility] Schedule an appointment as soon as possible for a visit   [facility] [address] [facility] [place], [address], [place], [place], [place], [address]    [address] [facility] Emergency Department Emergency Medicine  If symptoms worsen [address] [facility] [place] [address]  [address] [place] [address] ED, [address] [facility], [place], [place], [address]          Discharge Medication List as of 9/25/2020 11:53 PM      START taking these medications    Details   pantoprazole (PROTONIX) 40 mg tablet Take 1 tablet (40 mg total) by mouth 2 (two) times a day for 14 days, Starting Fri 9/25/2020, Until Fri 10/9/2020, Normal      sucralfate (CARAFATE) 1 g/10 mL suspension Take 10 mL (1 g total) by mouth 4 (four) times a day, Starting Fri 9/25/2020, Normal         CONTINUE these medications which have NOT CHANGED    Details   aspirin 81 mg chewable tablet Chew 81 mg daily, Historical Med      atorvastatin (LIPITOR) 20 mg tablet Take 1 tablet (20 mg total) by mouth daily, Starting Tue 11/26/2019, Normal      Blood Pressure Monitor MARIELLA by Does not apply route daily, Starting Tue 11/26/2019, Normal      clopidogrel (PLAVIX) 75 mg tablet Take 1 tablet (75 mg total) by mouth daily, Starting Tue 11/26/2019, Normal      ferrous sulfate 325 (65 Fe) mg tablet Take 325 mg by mouth daily with breakfast, Historical Med      folic acid (FOLVITE) 1 mg tablet Take 1 tablet (1 mg total) by mouth daily, Starting Mon 11/25/2019, Normal      gabapentin (NEURONTIN) 100 mg capsule take 1 capsule by mouth three times a day, Normal      glucose blood test strip Use as instructed, Normal      glucose monitoring kit (FREESTYLE) monitoring kit 1 each by Does not apply route daily, Starting Tue 11/26/2019, Normal      Lancets (FREESTYLE) lancets Use as instructed, Normal      lisinopril (ZESTRIL) 20 mg tablet Take 1 tablet (20 mg total) by mouth daily, Starting Tue 11/26/2019, Normal      mirtazapine (REMERON) 15 mg tablet Take 1 tablet (15 mg total) by mouth daily at bedtime, Starting Tue 11/26/2019, Normal      omega-3-acid ethyl esters (LOVAZA) 1 g capsule Take 2 capsules by mouth 2 (two) times a day, Starting Mon 1/23/2012, Historical Med      pregabalin (LYRICA) 300 MG capsule Take 300 mg by mouth 3 (three) times a day, Historical Med      sitaGLIPtin-metFORMIN (JANUMET)  MG per tablet Take 1 tablet by mouth 2 (two) times a day with meals, Starting Tue 11/26/2019, Normal      thiamine 100 MG tablet Take 1 tablet (100 mg total) by mouth daily, Starting Mon 11/25/2019, Normal           No discharge procedures on file      PDMP Review     None          ED Provider  Electronically Signed by           Jaspal El PA-C  09/26/20 8615

## 2020-09-26 NOTE — DISCHARGE INSTRUCTIONS
Take pantoprazole and Carafate as prescribed  Take Pepcid 20mg twice a day as needed for pain  Check your blood pressure daily and keep a log  Follow-up with GI and your family doctor  Return to ER with any worsening symptoms

## 2020-09-26 NOTE — ED NOTES
EKG delayed due to patient initially c/o abdominal pain, later informing the RN that he was having chest pain       Dawson Coello RN  09/25/20 2020

## 2020-10-22 DIAGNOSIS — E11.42 TYPE 2 DIABETES MELLITUS WITH DIABETIC POLYNEUROPATHY, WITHOUT LONG-TERM CURRENT USE OF INSULIN (HCC): ICD-10-CM

## 2020-10-22 RX ORDER — GABAPENTIN 100 MG/1
CAPSULE ORAL
Qty: 90 CAPSULE | Refills: 0 | Status: SHIPPED | OUTPATIENT
Start: 2020-10-22 | End: 2020-11-18

## 2020-10-23 ENCOUNTER — PREP FOR PROCEDURE (OUTPATIENT)
Dept: GASTROENTEROLOGY | Facility: CLINIC | Age: 75
End: 2020-10-23

## 2020-10-23 ENCOUNTER — OFFICE VISIT (OUTPATIENT)
Dept: GASTROENTEROLOGY | Facility: CLINIC | Age: 75
End: 2020-10-23
Payer: COMMERCIAL

## 2020-10-23 VITALS
SYSTOLIC BLOOD PRESSURE: 108 MMHG | WEIGHT: 156 LBS | TEMPERATURE: 96.5 F | DIASTOLIC BLOOD PRESSURE: 66 MMHG | BODY MASS INDEX: 25.99 KG/M2 | HEART RATE: 66 BPM | HEIGHT: 65 IN

## 2020-10-23 DIAGNOSIS — R93.5 ABNORMAL CT OF THE ABDOMEN: ICD-10-CM

## 2020-10-23 DIAGNOSIS — Z80.0 FAMILY HISTORY OF COLON CANCER: Primary | ICD-10-CM

## 2020-10-23 DIAGNOSIS — R10.12 LEFT UPPER QUADRANT ABDOMINAL PAIN: Primary | ICD-10-CM

## 2020-10-23 DIAGNOSIS — Z80.0 FAMILY HISTORY OF COLON CANCER: ICD-10-CM

## 2020-10-23 PROCEDURE — 99204 OFFICE O/P NEW MOD 45 MIN: CPT | Performed by: INTERNAL MEDICINE

## 2020-11-18 DIAGNOSIS — E11.42 TYPE 2 DIABETES MELLITUS WITH DIABETIC POLYNEUROPATHY, WITHOUT LONG-TERM CURRENT USE OF INSULIN (HCC): ICD-10-CM

## 2020-11-18 RX ORDER — GABAPENTIN 100 MG/1
CAPSULE ORAL
Qty: 90 CAPSULE | Refills: 0 | Status: SHIPPED | OUTPATIENT
Start: 2020-11-18

## 2020-12-21 DIAGNOSIS — E11.42 TYPE 2 DIABETES MELLITUS WITH DIABETIC POLYNEUROPATHY, WITHOUT LONG-TERM CURRENT USE OF INSULIN (HCC): ICD-10-CM

## 2020-12-21 RX ORDER — GABAPENTIN 100 MG/1
CAPSULE ORAL
Qty: 90 CAPSULE | Refills: 0 | OUTPATIENT
Start: 2020-12-21

## 2021-01-06 DIAGNOSIS — E11.42 TYPE 2 DIABETES MELLITUS WITH DIABETIC POLYNEUROPATHY, WITHOUT LONG-TERM CURRENT USE OF INSULIN (HCC): ICD-10-CM

## 2021-01-06 RX ORDER — GABAPENTIN 100 MG/1
CAPSULE ORAL
Qty: 90 CAPSULE | Refills: 0 | OUTPATIENT
Start: 2021-01-06

## 2021-01-22 DIAGNOSIS — E11.42 TYPE 2 DIABETES MELLITUS WITH DIABETIC POLYNEUROPATHY, WITHOUT LONG-TERM CURRENT USE OF INSULIN (HCC): ICD-10-CM

## 2021-01-22 RX ORDER — GABAPENTIN 100 MG/1
CAPSULE ORAL
Qty: 90 CAPSULE | Refills: 0 | OUTPATIENT
Start: 2021-01-22

## 2022-01-21 ENCOUNTER — APPOINTMENT (OUTPATIENT)
Dept: GASTROENTEROLOGY | Facility: CLINIC | Age: 77
End: 2022-01-21
Payer: MEDICARE

## 2022-01-21 ENCOUNTER — LABORATORY RESULT (OUTPATIENT)
Age: 77
End: 2022-01-21

## 2022-01-21 VITALS
TEMPERATURE: 91.4 F | BODY MASS INDEX: 26.66 KG/M2 | WEIGHT: 160 LBS | HEIGHT: 65 IN | DIASTOLIC BLOOD PRESSURE: 78 MMHG | HEART RATE: 83 BPM | SYSTOLIC BLOOD PRESSURE: 136 MMHG | OXYGEN SATURATION: 100 %

## 2022-01-21 DIAGNOSIS — Z86.39 PERSONAL HISTORY OF OTHER ENDOCRINE, NUTRITIONAL AND METABOLIC DISEASE: ICD-10-CM

## 2022-01-21 DIAGNOSIS — R10.13 EPIGASTRIC PAIN: ICD-10-CM

## 2022-01-21 DIAGNOSIS — Z83.3 FAMILY HISTORY OF DIABETES MELLITUS: ICD-10-CM

## 2022-01-21 DIAGNOSIS — Z01.818 ENCOUNTER FOR OTHER PREPROCEDURAL EXAMINATION: ICD-10-CM

## 2022-01-21 DIAGNOSIS — Z72.89 OTHER PROBLEMS RELATED TO LIFESTYLE: ICD-10-CM

## 2022-01-21 DIAGNOSIS — Z86.010 PERSONAL HISTORY OF COLONIC POLYPS: ICD-10-CM

## 2022-01-21 DIAGNOSIS — Z86.79 PERSONAL HISTORY OF OTHER DISEASES OF THE CIRCULATORY SYSTEM: ICD-10-CM

## 2022-01-21 DIAGNOSIS — R13.10 DYSPHAGIA, UNSPECIFIED: ICD-10-CM

## 2022-01-21 DIAGNOSIS — F17.200 NICOTINE DEPENDENCE, UNSPECIFIED, UNCOMPLICATED: ICD-10-CM

## 2022-01-21 DIAGNOSIS — Z12.11 ENCOUNTER FOR SCREENING FOR MALIGNANT NEOPLASM OF COLON: ICD-10-CM

## 2022-01-21 DIAGNOSIS — Z83.71 ENCOUNTER FOR SCREENING FOR MALIGNANT NEOPLASM OF COLON: ICD-10-CM

## 2022-01-21 DIAGNOSIS — Z80.0 FAMILY HISTORY OF MALIGNANT NEOPLASM OF DIGESTIVE ORGANS: ICD-10-CM

## 2022-01-21 DIAGNOSIS — Z78.9 OTHER SPECIFIED HEALTH STATUS: ICD-10-CM

## 2022-01-21 DIAGNOSIS — K21.9 GASTRO-ESOPHAGEAL REFLUX DISEASE W/OUT ESOPHAGITIS: ICD-10-CM

## 2022-01-21 DIAGNOSIS — K59.00 CONSTIPATION, UNSPECIFIED: ICD-10-CM

## 2022-01-21 PROBLEM — Z00.00 ENCOUNTER FOR PREVENTIVE HEALTH EXAMINATION: Status: ACTIVE | Noted: 2022-01-21

## 2022-01-21 PROCEDURE — 99204 OFFICE O/P NEW MOD 45 MIN: CPT

## 2022-01-21 RX ORDER — ATORVASTATIN CALCIUM 20 MG/1
20 TABLET, FILM COATED ORAL
Refills: 0 | Status: ACTIVE | COMMUNITY

## 2022-01-21 RX ORDER — CLOPIDOGREL 75 MG/1
75 TABLET, FILM COATED ORAL
Refills: 0 | Status: ACTIVE | COMMUNITY

## 2022-01-21 RX ORDER — ASPIRIN 81 MG
81 TABLET, DELAYED RELEASE (ENTERIC COATED) ORAL
Refills: 0 | Status: ACTIVE | COMMUNITY

## 2022-01-21 RX ORDER — SITAGLIPTIN AND METFORMIN HYDROCHLORIDE 50; 500 MG/1; MG/1
50-500 TABLET, FILM COATED, EXTENDED RELEASE ORAL
Refills: 0 | Status: ACTIVE | COMMUNITY

## 2022-01-21 RX ORDER — PANTOPRAZOLE 40 MG/1
40 TABLET, DELAYED RELEASE ORAL DAILY
Qty: 30 | Refills: 3 | Status: ACTIVE | COMMUNITY
Start: 2022-01-21 | End: 1900-01-01

## 2022-01-21 RX ORDER — POLYETHYLENE GLYCOL 3350 17 G/17G
17 POWDER, FOR SOLUTION ORAL DAILY
Qty: 30 | Refills: 3 | Status: ACTIVE | COMMUNITY
Start: 2022-01-21 | End: 1900-01-01

## 2022-01-21 RX ORDER — POLYETHYLENE GLYCOL 3350 AND ELECTROLYTES WITH LEMON FLAVOR 236; 22.74; 6.74; 5.86; 2.97 G/4L; G/4L; G/4L; G/4L; G/4L
236 POWDER, FOR SOLUTION ORAL
Qty: 1 | Refills: 0 | Status: ACTIVE | COMMUNITY
Start: 2022-01-21 | End: 1900-01-01

## 2022-01-21 NOTE — ASSESSMENT
[FreeTextEntry1] : Dyspepsia: The patient complains of dyspeptic symptoms.  The patient was advised to abide by an anti-gas (low FOD-MAP) diet.  The patient was given a pamphlet for anti-gas (low FOD-MAP).  The patient and I reviewed the anti-gas (low FOD-MAP) diet at length. The patient is to start on a trial of Simethicone one tablet 4 times a day p.r.n. abdominal pain and gas.\par GERD: The patient was advised to avoid late-night meals and dietary indiscretions.  The patient was advised to avoid fried and fatty foods.  The patient was advised to abide by an anti-GERD diet. The patient was given a pamphlet for anti-GERD.  The patient and I reviewed the anti-GERD diet at length. I recommend a trial of Pantoprazole 40 mg once a day x 3 months for the symptoms.\par Dysphagia: The patient complains of dysphagia of unclear etiology.   The dysphagia is worse with meals but not with liquids.   If the symptoms persist despite medications and if the upper endoscopy is unremarkable, the patient may require motility studies to assess the etiology of the dysphagia.  The patient agrees and will follow-up for further work-up and treatment.\par Constipation: The patient complains of constipation. I recommend a high-fiber diet. I recommend a trial of a probiotic such as Align once a day. I recommend a trial of Metamucil once a day for fiber supplementation. I recommend a trial of Miralax 1 packet once a day for the constipation. The patient agreed and will followup to reassess the symptoms.  \par History of Colonic Polyps: The patient has a history of colonic polyps.  I recommend a repeat colonoscopy to reassess for colonic polyps.  The patient was told of the risks and benefits of the procedure.  The patient was told of the risks of perforation, emergency surgery, bleeding, infections and missed lesions.  The patient is to be on a clear liquid diet the entire day prior to the procedure. The patient is to complete the entire prescribed bowel prep the day prior to the procedure as directed. The patient is to have a COVID 19 PCR nasopharyngeal swab performed at the approved sites 3 days prior to the procedure.  The patient is told not to drive, drink alcohol, use recreational drugs, exercise, or work the day of the procedure.  The patient was told of the need for an escort to accompany the patient home after the procedure. The patient is aware that the procedure may be cancelled if they fail to follow the directions.  The patient agreed and will schedule for the procedure. The patient can take the antihypertensive medication with a sip of water one hour prior to the procedure. The patient is to hold the diabetic medication the day before and the morning of the procedure. The patient is to hold the blood thinner medication for 7 days prior to the procedure. The patient is to be n.p.o. after midnight and bowel prep was given.  The patient is to return for the procedure. \par Colonoscopy: I recommend a colonoscopy to assess the symptoms and for colonic polyps.  The patient was told of the risks and benefits of the procedure.  The patient was told of the risks of perforation, emergency surgery, bleeding, infections and missed lesions.  The patient is to be on a clear liquid diet the entire day prior to the procedure. The patient is to complete the entire prescribed bowel prep the day prior to the procedure as directed. The patient is to have a COVID 19 PCR nasopharyngeal swab performed at the approved sites 3 days prior to the procedure.  The patient is told not to drive, drink alcohol, use recreational drugs, exercise, or work the day of the procedure.  The patient was told of the need for an escort to accompany the patient home after the procedure. The patient is aware that the procedure may be cancelled if they fail to follow the directions.  The patient agreed and will schedule for the procedure. The patient can take the antihypertensive medication with a sip of water one hour prior to the procedure. The patient is to hold the diabetic medication the day before and the morning of the procedure. The patient is to hold the blood thinner medication for 7 days prior to the procedure. The patient is to be n.p.o. after midnight and bowel prep was given.  The patient is to return for the procedure.\par Upper Endoscopy: I recommend an upper endoscopy to assess for peptic ulcer disease versus esophagitis.  The patient was told of the risks and benefits of the procedure.  The patient was told of the risks of perforation, emergency surgery, bleeding, infections and missed lesions. The patient is to have a COVID 19 PCR nasopharyngeal swab performed at the approved sites 3 days prior to the procedure.  The patient is told not to drive, drink alcohol, use recreational drugs, exercise, or work the day of the procedure.  The patient was told of the need for an escort to accompany the patient home after the procedure. The patient is aware that the procedure may be cancelled if they fail to follow the directions.  The patient agreed and will schedule for the procedure. The patient can take the antihypertensive medication with a sip of water one hour prior to the procedure. The patient is to hold the diabetic medication the day the morning of the procedure. The patient is to be n.p.o. after midnight.  The patient is to return for the procedure.\par Family History of Colon Cancer and Colonic Polyps: The patient admits to a family history of GI problems.  The patient’s father had a history of colon cancer and sister requiring surgery for colonic polyps.\par Follow-up: The patient is to follow-up in the office in 4 weeks to reassess the symptoms. The patient was told to call the office if any further problems. \par \par \par

## 2022-01-21 NOTE — HISTORY OF PRESENT ILLNESS
[None] : had no significant interval events [Nausea] : denies nausea [Vomiting] : denies vomiting [Diarrhea] : denies diarrhea [Yellow Skin Or Eyes (Jaundice)] : denies jaundice [Abdominal Pain] : denies abdominal pain [Rectal Pain] : denies rectal pain [Wt Gain ___ Lbs] : recent [unfilled] ~Upound(s) weight gain [Heartburn] : heartburn [Constipation] : constipation [Abdominal Swelling] : abdominal swelling [GERD] : gastroesophageal reflux disease [Wt Loss ___ Lbs] : no recent weight loss [Hiatus Hernia] : no hiatus hernia [Peptic Ulcer Disease] : no peptic ulcer disease [Pancreatitis] : no pancreatitis [Cholelithiasis] : no cholelithiasis [Kidney Stone] : no kidney stone [Inflammatory Bowel Disease] : no inflammatory bowel disease [Irritable Bowel Syndrome] : no irritable bowel syndrome [Diverticulitis] : no diverticulitis [Alcohol Abuse] : no alcohol abuse [Malignancy] : no malignancy [Abdominal Surgery] : no abdominal surgery [Appendectomy] : no appendectomy [Cholecystectomy] : no cholecystectomy [de-identified] : The patient is a 76-year-old  male with past medical history significant for hypertension, hypercholesterolemia, diabetes mellitus and coronary artery disease, s/p cardiac stent placement x 6 on Plavix and aspirin who was referred to my office by Dr. Alanis Rasmussen for constipation, change in bowel habits and change in caliber of stool. The patient also admits to having dyspepsia and gastroesophageal reflux disease. I was asked to render an opinion for consultation for the above complaints.  The patient admits to a family history of colon cancer and colonic polyps.  The patient admits to having a history of colonic polyps.  The patient states that he is feeling fine.  The patient denies any abdominal pain.  The patient complains of abdominal gas and bloating.  The patient denies any nausea or vomiting.  The patient complains of gastroesophageal reflux disease and occasional dysphagia. The dysphagia occurs with both solids and liquids.  The gastroesophageal reflux disease is worse after meals and late at night and in the early morning.  The patient denies taking medications for the gastroesophageal reflux disease. The denies any atypical chest pain, shortness of breath or palpitations.  The patient denies any diaphoresis. The patient complains of constipation but denies any diarrhea.  The patient has 1 bowel movement a week. The patient complains of a change in bowel habits.  The patient complains of a change in caliber of stool.  The patient denies having mucus discharge with the bowel movements.  The patient denies any bright red blood per rectum, melena or hematemesis.  The patient denies any rectal pain or rectal pruritus. The patient denies any weight loss or anorexia.  The patient admits to gaining weight recently. He denies any fevers or chills.  The patient denies any jaundice or pruritus.  The patient denies any back pain.  The patient admits to having a prior upper endoscopy and colonoscopy performed by another gastroenterologist.  According to the patient, the upper endoscopic findings was unknown to the patient.  The colonoscopic findings revealed colonic polypsand internal hemorrhoids.   The patient admits to a family history of GI problems.  The patient’s father had a history of colon cancer and sister requiring surgery for colonic polyps. [de-identified] : (+) smoking 1/2 PPD x 62 years, (+) prior ETOH abuse stopped x 2 years, (+) prior IVDA heroin use stopped x age 19\par

## 2022-01-21 NOTE — REVIEW OF SYSTEMS
[Eyesight Problems] : eyesight problems [Cough] : cough [SOB on Exertion] : shortness of breath during exertion [Constipation] : constipation [Heartburn] : heartburn [Arthralgias] : arthralgias [Anxiety] : anxiety [Depression] : depression [Easy Bruising] : a tendency for easy bruising [Negative] : Heme/Lymph [FreeTextEntry9] : back pain [de-identified] : unsteady gait

## 2022-01-28 ENCOUNTER — NON-APPOINTMENT (OUTPATIENT)
Age: 77
End: 2022-01-28

## 2022-03-16 LAB — SARS-COV-2 N GENE NPH QL NAA+PROBE: NOT DETECTED

## 2022-03-17 ENCOUNTER — APPOINTMENT (OUTPATIENT)
Dept: GASTROENTEROLOGY | Facility: HOSPITAL | Age: 77
End: 2022-03-17

## 2022-03-17 ENCOUNTER — RESULT REVIEW (OUTPATIENT)
Age: 77
End: 2022-03-17

## 2022-03-17 ENCOUNTER — OUTPATIENT (OUTPATIENT)
Dept: OUTPATIENT SERVICES | Facility: HOSPITAL | Age: 77
LOS: 1 days | End: 2022-03-17
Payer: MEDICARE

## 2022-03-17 DIAGNOSIS — Z86.010 PERSONAL HISTORY OF COLONIC POLYPS: ICD-10-CM

## 2022-03-17 DIAGNOSIS — Z95.5 PRESENCE OF CORONARY ANGIOPLASTY IMPLANT AND GRAFT: Chronic | ICD-10-CM

## 2022-03-17 DIAGNOSIS — K21.9 GASTRO-ESOPHAGEAL REFLUX DISEASE WITHOUT ESOPHAGITIS: ICD-10-CM

## 2022-03-17 LAB — GLUCOSE BLDC GLUCOMTR-MCNC: 204 MG/DL — HIGH (ref 70–99)

## 2022-03-17 PROCEDURE — 88312 SPECIAL STAINS GROUP 1: CPT

## 2022-03-17 PROCEDURE — 82962 GLUCOSE BLOOD TEST: CPT

## 2022-03-17 PROCEDURE — 88305 TISSUE EXAM BY PATHOLOGIST: CPT

## 2022-03-17 PROCEDURE — 43239 EGD BIOPSY SINGLE/MULTIPLE: CPT

## 2022-03-17 PROCEDURE — 88312 SPECIAL STAINS GROUP 1: CPT | Mod: 26

## 2022-03-17 PROCEDURE — 45378 DIAGNOSTIC COLONOSCOPY: CPT

## 2022-03-17 PROCEDURE — 88305 TISSUE EXAM BY PATHOLOGIST: CPT | Mod: 26

## 2022-03-17 PROCEDURE — G0105: CPT

## 2022-03-17 DEVICE — CATH ESOPH DIL 8 ATM 6FR10-12M: Type: IMPLANTABLE DEVICE | Status: FUNCTIONAL

## 2022-03-17 DEVICE — CATH ESOPH DIL 9 ATM 6FR 8-10MM: Type: IMPLANTABLE DEVICE | Status: FUNCTIONAL

## 2022-03-17 DEVICE — CATH BALLOON DIL 6-8MM: Type: IMPLANTABLE DEVICE | Status: FUNCTIONAL

## 2022-03-21 LAB — SURGICAL PATHOLOGY STUDY: SIGNIFICANT CHANGE UP

## 2022-03-22 ENCOUNTER — NON-APPOINTMENT (OUTPATIENT)
Age: 77
End: 2022-03-22

## 2022-11-26 ENCOUNTER — HOSPITAL ENCOUNTER (EMERGENCY)
Facility: HOSPITAL | Age: 77
Discharge: HOME | End: 2022-11-27
Attending: EMERGENCY MEDICINE

## 2022-11-26 ENCOUNTER — APPOINTMENT (EMERGENCY)
Dept: RADIOLOGY | Facility: HOSPITAL | Age: 77
End: 2022-11-26

## 2022-11-26 VITALS
RESPIRATION RATE: 18 BRPM | DIASTOLIC BLOOD PRESSURE: 72 MMHG | SYSTOLIC BLOOD PRESSURE: 166 MMHG | TEMPERATURE: 97.9 F | OXYGEN SATURATION: 97 % | HEART RATE: 61 BPM

## 2022-11-26 DIAGNOSIS — R05.1 ACUTE COUGH: ICD-10-CM

## 2022-11-26 DIAGNOSIS — K30 INDIGESTION: ICD-10-CM

## 2022-11-26 DIAGNOSIS — E87.1 HYPONATREMIA: ICD-10-CM

## 2022-11-26 DIAGNOSIS — R06.02 SOB (SHORTNESS OF BREATH): ICD-10-CM

## 2022-11-26 DIAGNOSIS — B34.9 ACUTE VIRAL SYNDROME: Primary | ICD-10-CM

## 2022-11-26 DIAGNOSIS — R53.83 FATIGUE: ICD-10-CM

## 2022-11-26 LAB
ANION GAP SERPL CALCULATED.3IONS-SCNC: 11 MMOL/L (ref 4–13)
BASOPHILS # BLD AUTO: 0.01 THOUSANDS/ÂΜL (ref 0–0.1)
BASOPHILS NFR BLD AUTO: 0 % (ref 0–1)
BUN SERPL-MCNC: 10 MG/DL (ref 5–25)
CALCIUM SERPL-MCNC: 8.7 MG/DL (ref 8.3–10.1)
CHLORIDE SERPL-SCNC: 96 MMOL/L (ref 96–108)
CO2 SERPL-SCNC: 21 MMOL/L (ref 21–32)
CREAT SERPL-MCNC: 1.22 MG/DL (ref 0.6–1.3)
EOSINOPHIL # BLD AUTO: 0.08 THOUSAND/ÂΜL (ref 0–0.61)
EOSINOPHIL NFR BLD AUTO: 1 % (ref 0–6)
ERYTHROCYTE [DISTWIDTH] IN BLOOD BY AUTOMATED COUNT: 15 % (ref 11.6–15.1)
GFR SERPL CREATININE-BSD FRML MDRD: 56 ML/MIN/1.73SQ M
GLUCOSE SERPL-MCNC: 133 MG/DL (ref 65–140)
HCT VFR BLD AUTO: 33.6 % (ref 36.5–49.3)
HGB BLD-MCNC: 11.8 G/DL (ref 12–17)
IMM GRANULOCYTES # BLD AUTO: 0.02 THOUSAND/UL (ref 0–0.2)
IMM GRANULOCYTES NFR BLD AUTO: 0 % (ref 0–2)
LYMPHOCYTES # BLD AUTO: 1.37 THOUSANDS/ÂΜL (ref 0.6–4.47)
LYMPHOCYTES NFR BLD AUTO: 17 % (ref 14–44)
MCH RBC QN AUTO: 33.1 PG (ref 26.8–34.3)
MCHC RBC AUTO-ENTMCNC: 35.1 G/DL (ref 31.4–37.4)
MCV RBC AUTO: 94 FL (ref 82–98)
MONOCYTES # BLD AUTO: 0.72 THOUSAND/ÂΜL (ref 0.17–1.22)
MONOCYTES NFR BLD AUTO: 9 % (ref 4–12)
NEUTROPHILS # BLD AUTO: 5.72 THOUSANDS/ÂΜL (ref 1.85–7.62)
NEUTS SEG NFR BLD AUTO: 73 % (ref 43–75)
NRBC BLD AUTO-RTO: 0 /100 WBCS
PLATELET # BLD AUTO: 193 THOUSANDS/UL (ref 149–390)
PMV BLD AUTO: 9.9 FL (ref 8.9–12.7)
POTASSIUM SERPL-SCNC: 5 MMOL/L (ref 3.5–5.3)
RBC # BLD AUTO: 3.56 MILLION/UL (ref 3.88–5.62)
SODIUM SERPL-SCNC: 128 MMOL/L (ref 135–147)
WBC # BLD AUTO: 7.92 THOUSAND/UL (ref 4.31–10.16)

## 2022-11-26 RX ORDER — MAGNESIUM HYDROXIDE/ALUMINUM HYDROXICE/SIMETHICONE 120; 1200; 1200 MG/30ML; MG/30ML; MG/30ML
30 SUSPENSION ORAL ONCE
Status: COMPLETED | OUTPATIENT
Start: 2022-11-26 | End: 2022-11-26

## 2022-11-26 RX ORDER — LIDOCAINE HYDROCHLORIDE 20 MG/ML
15 SOLUTION OROPHARYNGEAL ONCE
Status: COMPLETED | OUTPATIENT
Start: 2022-11-26 | End: 2022-11-26

## 2022-11-26 RX ADMIN — LIDOCAINE HYDROCHLORIDE 15 ML: 20 SOLUTION ORAL; TOPICAL at 22:28

## 2022-11-26 RX ADMIN — ALUMINUM HYDROXIDE, MAGNESIUM HYDROXIDE, AND DIMETHICONE 30 ML: 200; 20; 200 SUSPENSION ORAL at 22:28

## 2022-11-27 LAB
ATRIAL RATE: 69 BPM
CARDIAC TROPONIN I PNL SERPL HS: 3 NG/L
P AXIS: 125 DEGREES
PR INTERVAL: 152 MS
QRS AXIS: 121 DEGREES
QRSD INTERVAL: 90 MS
QT INTERVAL: 386 MS
QTC INTERVAL: 413 MS
T WAVE AXIS: 118 DEGREES
VENTRICULAR RATE: 69 BPM

## 2022-11-27 NOTE — DISCHARGE INSTRUCTIONS
Please follow up PCP  Recommend tylenol 650 mg and ibuprofen 600 mg every 6 hours as needed for pain  You likely have influenza  Expect symptoms last approximately 7-10 days with worst symptoms on day 3 or 4  Please return for severe chest pain, significant shortness of breath, severely worsening symptoms, or any other concerning signs or symptoms  Please refer to the following documents for additional instructions and return precautions

## 2022-11-27 NOTE — ED PROVIDER NOTES
History  Chief Complaint   Patient presents with   • Fatigue     Patient reports generalized fatigue, runny nose, and "indigestion" in their throat starting thanksgiving  Patient reports living with a child who is in the department getting worked up for upper respiratory virus  Patient reports "mild" shortness of breath  Denies any chest pain  Tylenol PM taken 1 hour prior to arrival       66-year-old male history of CAD, diabetes, hypertension on aspirin Plavix presenting with a viral syndrome  Patient reports 2 days of nasal congestion, rhinorrhea, dry nonproductive cough with known sick contact at home with grandson  Also now complaining of mild shortness of breath and throat discomfort which feels like indigestion  Began earlier today has remained unchanged  Denies any association with exertion, vomiting, lightheadedness/syncope, radiation  Denies any other complaints  Chart reviewed  Past Medical History:  No date: Chest pain  3/15/2013: Chronic hepatitis C (HCC)  No date: Diabetes mellitus ([place] [id number] )  No date: Heartburn  No date: Hepatitis C  No date: Indigestion  No date: Liver disease  No date: Myocardial infarction ([place] [address] )  No date: Seizures ([place] [address] )  Family History: non-contributory  Social History            Prior to Admission Medications   Prescriptions Last Dose Informant Patient Reported? Taking?    Blood Pressure Monitor MARIELLA   No No   Sig: by Does not apply route daily   Lancets (FREESTYLE) lancets   No No   Sig: Use as instructed   aspirin 81 mg chewable tablet   Yes No   Sig: Chew 81 mg daily   atorvastatin (LIPITOR) 20 mg tablet   No No   Sig: Take 1 tablet (20 mg total) by mouth daily   clopidogrel (PLAVIX) 75 mg tablet   No No   Sig: Take 1 tablet (75 mg total) by mouth daily   ferrous sulfate 325 (65 Fe) mg tablet   Yes No   Sig: Take 325 mg by mouth daily with breakfast   folic acid (FOLVITE) 1 mg tablet   No No   Sig: Take 1 tablet (1 mg total) by mouth daily   gabapentin (NEURONTIN) 100 mg capsule   No No   Sig: take 1 capsule by mouth three times a day   glucose blood test strip   No No   Sig: Use as instructed   glucose monitoring kit (FREESTYLE) monitoring kit   No No   Si each by Does not apply route daily   lisinopril (ZESTRIL) 20 mg tablet   No No   Sig: Take 1 tablet (20 mg total) by mouth daily   mirtazapine (REMERON) 15 mg tablet   No No   Sig: Take 1 tablet (15 mg total) by mouth daily at bedtime   omega-3-acid ethyl esters (LOVAZA) 1 g capsule   Yes No   Sig: Take 2 capsules by mouth 2 (two) times a day   pantoprazole (PROTONIX) 40 mg tablet   No No   Sig: Take 1 tablet (40 mg total) by mouth 2 (two) times a day for 14 days   pregabalin (LYRICA) 300 MG capsule   Yes No   Sig: Take 300 mg by mouth 3 (three) times a day   sitaGLIPtin-metFORMIN (JANUMET)  MG per tablet   No No   Sig: Take 1 tablet by mouth 2 (two) times a day with meals   sucralfate (CARAFATE) 1 g/10 mL suspension   No No   Sig: Take 10 mL (1 g total) by mouth 4 (four) times a day   thiamine 100 MG tablet   No No   Sig: Take 1 tablet (100 mg total) by mouth daily      Facility-Administered Medications: None       Past Medical History:   Diagnosis Date   • Chest pain    • Chronic hepatitis C ([facility] ) 3/15/2013   • Diabetes mellitus (HCC)    • Heartburn    • Hepatitis C    • Indigestion    • Liver disease    • Myocardial infarction ([facility] [address] )    • Seizures ([facility] )        History reviewed  No pertinent surgical history  Family History   Problem Relation Age of Onset   • Heart attack Mother    • Diabetes Mother    • Colon cancer Father    • Diabetes Father    • Alcohol abuse Neg Hx    • Substance Abuse Neg Hx    • Mental illness Neg Hx    • Depression Neg Hx      I have reviewed and agree with the history as documented      E-Cigarette/Vaping   • E-Cigarette Use Never User      E-Cigarette/Vaping Substances   • Nicotine No    • THC No    • CBD No    • Flavoring No    • Other No    • Unknown No      Social History     Tobacco Use • Smoking status: Every Day   • Smokeless tobacco: Never   Vaping Use   • Vaping Use: Never used   Substance Use Topics   • Alcohol use: Yes     Alcohol/week: 21 0 standard drinks     Types: 21 Cans of beer per week   • Drug use: Not Currently     Types: Marijuana       Review of Systems   Constitutional: Positive for fatigue  Negative for appetite change, chills, diaphoresis, fever and unexpected weight change  HENT: Positive for congestion and rhinorrhea  Eyes: Negative for photophobia and visual disturbance  Respiratory: Positive for cough and shortness of breath  Negative for chest tightness  Cardiovascular: Negative for chest pain, palpitations and leg swelling  Gastrointestinal: Negative for abdominal distention, abdominal pain, blood in stool, constipation, diarrhea, nausea and vomiting  Genitourinary: Negative for dysuria and hematuria  Musculoskeletal: Negative for back pain, joint swelling, neck pain and neck stiffness  Skin: Negative for color change, pallor, rash and wound  Neurological: Negative for dizziness, syncope, weakness, light-headedness and headaches  Psychiatric/Behavioral: Negative for agitation  All other systems reviewed and are negative  Physical Exam  Physical Exam  Vitals and nursing note reviewed  Constitutional:       General: He is not in acute distress  Appearance: Normal appearance  He is well-developed  He is not ill-appearing, toxic-appearing or diaphoretic  HENT:      Head: Normocephalic and atraumatic  Nose: Nose normal  No congestion or rhinorrhea  Mouth/Throat:      Mouth: Mucous membranes are moist       Pharynx: Oropharynx is clear  No oropharyngeal exudate or posterior oropharyngeal erythema  Eyes:      General: No scleral icterus  Right eye: No discharge  Left eye: No discharge  Extraocular Movements: Extraocular movements intact        Conjunctiva/sclera: Conjunctivae normal       Pupils: Pupils are equal, round, and reactive to light  Neck:      Vascular: No JVD  Trachea: No tracheal deviation  Comments: Supple  Normal range of motion  Cardiovascular:      Rate and Rhythm: Normal rate and regular rhythm  Heart sounds: Normal heart sounds  No murmur heard  No friction rub  No gallop  Comments: Normal rate and regular rhythm  Pulmonary:      Effort: Pulmonary effort is normal  No respiratory distress  Breath sounds: Normal breath sounds  No stridor  No wheezing or rales  Comments: Clear to auscultation bilaterally  Chest:      Chest wall: No tenderness  Abdominal:      General: Bowel sounds are normal  There is no distension  Palpations: Abdomen is soft  Tenderness: There is no abdominal tenderness  There is no right CVA tenderness, left CVA tenderness, guarding or rebound  Comments: Soft, nontender, nondistended  Normal bowel sounds throughout   Musculoskeletal:         General: No swelling, tenderness, deformity or signs of injury  Normal range of motion  Cervical back: Normal range of motion and neck supple  No rigidity  No muscular tenderness  Right lower leg: No edema  Left lower leg: No edema  Lymphadenopathy:      Cervical: No cervical adenopathy  Skin:     General: Skin is warm and dry  Coloration: Skin is not pale  Findings: No erythema or rash  Neurological:      General: No focal deficit present  Mental Status: He is alert  Mental status is at baseline  Sensory: No sensory deficit  Motor: No weakness or abnormal muscle tone  Coordination: Coordination normal       Gait: Gait normal       Comments: Alert  Strength and sensation grossly intact  Ambulatory without difficulty at baseline  Psychiatric:         Behavior: Behavior normal          Thought Content:  Thought content normal          Vital Signs  ED Triage Vitals [11/26/22 2215]   Temperature Pulse Respirations Blood Pressure SpO2   97 9 °F (36 6 °C) 69 19 (!) 174/80 99 %      Temp Source Heart Rate Source Patient Position - Orthostatic VS BP Location FiO2 (%)   Oral Monitor Lying Left arm --      Pain Score       --           Vitals:    11/26/22 2215 11/26/22 2330   BP: (!) 174/80 166/72   Pulse: 69 61   Patient Position - Orthostatic VS: Lying          Visual Acuity      ED Medications  Medications   aluminum-magnesium hydroxide-simethicone (MYLANTA) oral suspension 30 mL (30 mL Oral Given 11/26/22 2228)   Lidocaine Viscous HCl (XYLOCAINE) 2 % mucosal solution 15 mL (15 mL Swish & Swallow Given 11/26/22 2228)       Diagnostic Studies  Results Reviewed     Procedure Component Value Units Date/Time    HS Troponin 0hr (reflex protocol) [[id number]]  (Normal) Collected: 11/26/22 2228    Lab Status: Final result Specimen: Blood from Arm, Left Updated: 11/27/22 0014     hs TnI 0hr 3 ng/L     Basic metabolic panel [[id number]]  (Abnormal) Collected: 11/26/22 2228    Lab Status: Final result Specimen: Blood from Arm, Left Updated: 11/26/22 2318     Sodium 128 mmol/L      Potassium 5 0 mmol/L      Chloride 96 mmol/L      CO2 21 mmol/L      ANION GAP 11 mmol/L      BUN 10 mg/dL      Creatinine 1 22 mg/dL      Glucose 133 mg/dL      Calcium 8 7 mg/dL      eGFR 56 ml/min/1 73sq m     Narrative:      [place] guidelines for Chronic Kidney Disease (CKD):   •  Stage 1 with normal or high GFR (GFR > 90 mL/min/1 73 square meters)  •  Stage 2 Mild CKD (GFR = 60-89 mL/min/1 73 square meters)  •  Stage 3A Moderate CKD (GFR = 45-59 mL/min/1 73 square meters)  •  Stage 3B Moderate CKD (GFR = 30-44 mL/min/1 73 square meters)  •  Stage 4 Severe CKD (GFR = 15-29 mL/min/1 73 square meters)  •  Stage 5 End Stage CKD (GFR <15 mL/min/1 73 square meters)  Note: GFR calculation is accurate only with a steady state creatinine    CBC and differential [[id number]]  (Abnormal) Collected: 11/26/22 2228    Lab Status: Final result Specimen: Blood from Arm, Left Updated: 11/26/22 2255     WBC 7 92 Thousand/uL      RBC 3 56 Million/uL      Hemoglobin 11 8 g/dL      Hematocrit 33 6 %      MCV 94 fL      MCH 33 1 pg      MCHC 35 1 g/dL      RDW 15 0 %      MPV 9 9 fL      Platelets 189 Thousands/uL      nRBC 0 /100 WBCs      Neutrophils Relative 73 %      Immat GRANS % 0 %      Lymphocytes Relative 17 %      Monocytes Relative 9 %      Eosinophils Relative 1 %      Basophils Relative 0 %      Neutrophils Absolute 5 72 Thousands/µL      Immature Grans Absolute 0 02 Thousand/uL      Lymphocytes Absolute 1 37 Thousands/µL      Monocytes Absolute 0 72 Thousand/µL      Eosinophils Absolute 0 08 Thousand/µL      Basophils Absolute 0 01 Thousands/µL     FLU/COVID - if FLU clinically relevant [[id number]] Collected: 11/26/22 2228    Lab Status: In process Specimen: Nares from Nose Updated: 11/26/22 2243                 XR chest 2 views    (Results Pending)              Procedures  Procedures         ED Course             HEART Risk Score    Flowsheet Row Most Recent Value   Heart Score Risk Calculator    History 0 Filed at: 11/26/2022 2330   ECG 1 Filed at: 11/26/2022 2330   Age 2 Filed at: 11/26/2022 2330   Risk Factors 2 Filed at: 11/26/2022 2330   Troponin 0 Filed at: 11/26/2022 2330   HEART Score 5 Filed at: 11/26/2022 2330                        SBIRT 22yo+    Flowsheet Row Most Recent Value   SBIRT (25 yo +)    In order to provide better care to our patients, we are screening all of our patients for alcohol and drug use  Would it be okay to ask you these screening questions? Unable to answer at this time Filed at: 11/26/2022 2309                    MDM  Number of Diagnoses or Management Options  Acute cough  Acute viral syndrome  Fatigue  Indigestion  SOB (shortness of breath)  Diagnosis management comments: 68-year-old male history of CAD, diabetes, hypertension on aspirin Plavix presenting with a viral syndrome    Viral symptoms with known sick contact, likely viral   Plan for COVID flu testing  Shortness of breath with some indigestion like symptoms in setting of known cardiac history  Plan for cardiac evaluation including EKG and troponin  Chest x-ray  Basic labs  Plan for GI cocktail  Reassess  EKG normal sinus rhythm with nonspecific ST abnormalities  Sodium 128  Advise follow-up  Labs and imaging otherwise unremarkable  Grandson with influenza  Discussed tamiflu with patient, declining  Discussed results and recommendations  Advised follow up PCP  Medication recommendations  Given instructions and return precautions  Patient/family at bedside acknowledged understanding of all written and verbal instructions and return precautions  Discharged          Amount and/or Complexity of Data Reviewed  Clinical lab tests: reviewed and ordered  Tests in the radiology section of CPT®: reviewed and ordered  Tests in the medicine section of CPT®: reviewed and ordered  Decide to obtain previous medical records or to obtain history from someone other than the patient: yes  Obtain history from someone other than the patient: yes  Review and summarize past medical records: yes  Independent visualization of images, tracings, or specimens: yes    Risk of Complications, Morbidity, and/or Mortality  Presenting problems: high  Diagnostic procedures: high  Management options: high    Patient Progress  Patient progress: improved      Disposition  Final diagnoses:   Fatigue   Acute viral syndrome   Indigestion   Acute cough   SOB (shortness of breath)   Hyponatremia     Time reflects when diagnosis was documented in both MDM as applicable and the Disposition within this note     Time User Action Codes Description Comment    11/26/2022 11:52 PM Helon Horn Add [R53 83] Fatigue     11/26/2022 11:52 PM Helon Horn Add [B34 9] Acute viral syndrome     11/26/2022 11:52 PM Helon Horn Add [K30] Indigestion     11/26/2022 11:53 PM Helon Horn Modify [R53 83] Fatigue     11/26/2022 11:53 PM Helon Horn Modify [B34 9] Acute viral syndrome     11/26/2022 11:53 PM Maricruz Riser Add [R05 1] Acute cough     11/26/2022 11:53 PM Maricruz Riser Add [R06 02] SOB (shortness of breath)     11/27/2022  5:01 AM Maricruz Riser Add [E87 1] Hyponatremia       ED Disposition     ED Disposition   Discharge    Condition   Stable    Date/Time   Sat Nov 26, 2022 11:52 PM    [address] discharge to home/self care                 Follow-up Information     Follow up With Specialties Details Why Contact Info    [facility]  Call  for [phone]            Discharge Medication List as of 11/27/2022 12:19 AM      CONTINUE these medications which have NOT CHANGED    Details   aspirin 81 mg chewable tablet Chew 81 mg daily, Historical Med      atorvastatin (LIPITOR) 20 mg tablet Take 1 tablet (20 mg total) by mouth daily, Starting Tue 11/26/2019, Normal      Blood Pressure Monitor MARIELLA by Does not apply route daily, Starting Tue 11/26/2019, Normal      clopidogrel (PLAVIX) 75 mg tablet Take 1 tablet (75 mg total) by mouth daily, Starting Tue 11/26/2019, Normal      ferrous sulfate 325 (65 Fe) mg tablet Take 325 mg by mouth daily with breakfast, Historical Med      folic acid (FOLVITE) 1 mg tablet Take 1 tablet (1 mg total) by mouth daily, Starting Mon 11/25/2019, Normal      gabapentin (NEURONTIN) 100 mg capsule take 1 capsule by mouth three times a day, Normal      glucose blood test strip Use as instructed, Normal      glucose monitoring kit (FREESTYLE) monitoring kit 1 each by Does not apply route daily, Starting Tue 11/26/2019, Normal      Lancets (FREESTYLE) lancets Use as instructed, Normal      lisinopril (ZESTRIL) 20 mg tablet Take 1 tablet (20 mg total) by mouth daily, Starting Tue 11/26/2019, Normal      mirtazapine (REMERON) 15 mg tablet Take 1 tablet (15 mg total) by mouth daily at bedtime, Starting Tue 11/26/2019, Normal      omega-3-acid ethyl esters (LOVAZA) 1 g capsule Take 2 capsules by mouth 2 (two) times a day, Starting Mon 1/23/2012, Historical Med      pantoprazole (PROTONIX) 40 mg tablet Take 1 tablet (40 mg total) by mouth 2 (two) times a day for 14 days, Starting Fri 9/25/2020, Until Fri 10/9/2020, Normal      pregabalin (LYRICA) 300 MG capsule Take 300 mg by mouth 3 (three) times a day, Historical Med      sitaGLIPtin-metFORMIN (JANUMET)  MG per tablet Take 1 tablet by mouth 2 (two) times a day with meals, Starting Tue 11/26/2019, Normal      sucralfate (CARAFATE) 1 g/10 mL suspension Take 10 mL (1 g total) by mouth 4 (four) times a day, Starting Fri 9/25/2020, Normal      thiamine 100 MG tablet Take 1 tablet (100 mg total) by mouth daily, Starting Mon 11/25/2019, Normal             No discharge procedures on file      PDMP Review     None          ED Provider  Electronically Signed by           Bisi Pacheco MD  11/27/22 8317

## 2022-11-28 LAB
FLUAV RNA RESP QL NAA+PROBE: NEGATIVE
FLUBV RNA RESP QL NAA+PROBE: NEGATIVE
SARS-COV-2 RNA RESP QL NAA+PROBE: NEGATIVE

## 2022-12-06 ENCOUNTER — HOSPITAL ENCOUNTER (EMERGENCY)
Facility: HOSPITAL | Age: 77
Discharge: HOME | End: 2022-12-07
Attending: EMERGENCY MEDICINE

## 2022-12-06 ENCOUNTER — APPOINTMENT (EMERGENCY)
Dept: CT IMAGING | Facility: HOSPITAL | Age: 77
End: 2022-12-06

## 2022-12-06 DIAGNOSIS — K85.90 PANCREATITIS: Primary | ICD-10-CM

## 2022-12-06 LAB
ALBUMIN SERPL BCP-MCNC: 3.5 G/DL (ref 3.5–5)
ALP SERPL-CCNC: 77 U/L (ref 46–116)
ALT SERPL W P-5'-P-CCNC: 62 U/L (ref 12–78)
ANION GAP SERPL CALCULATED.3IONS-SCNC: 12 MMOL/L (ref 4–13)
AST SERPL W P-5'-P-CCNC: 24 U/L (ref 5–45)
ATRIAL RATE: 69 BPM
BASOPHILS # BLD AUTO: 0 THOUSANDS/ÂΜL (ref 0–0.1)
BASOPHILS NFR BLD AUTO: 0 % (ref 0–1)
BILIRUB SERPL-MCNC: 0.31 MG/DL (ref 0.2–1)
BUN SERPL-MCNC: 9 MG/DL (ref 5–25)
CALCIUM SERPL-MCNC: 8.7 MG/DL (ref 8.3–10.1)
CARDIAC TROPONIN I PNL SERPL HS: 5 NG/L
CHLORIDE SERPL-SCNC: 90 MMOL/L (ref 96–108)
CO2 SERPL-SCNC: 26 MMOL/L (ref 21–32)
CREAT SERPL-MCNC: 1.22 MG/DL (ref 0.6–1.3)
EOSINOPHIL # BLD AUTO: 0.03 THOUSAND/ÂΜL (ref 0–0.61)
EOSINOPHIL NFR BLD AUTO: 1 % (ref 0–6)
ERYTHROCYTE [DISTWIDTH] IN BLOOD BY AUTOMATED COUNT: 14.1 % (ref 11.6–15.1)
FLUAV RNA RESP QL NAA+PROBE: NEGATIVE
FLUBV RNA RESP QL NAA+PROBE: NEGATIVE
GFR SERPL CREATININE-BSD FRML MDRD: 56 ML/MIN/1.73SQ M
GLUCOSE SERPL-MCNC: 260 MG/DL (ref 65–140)
HCT VFR BLD AUTO: 34.3 % (ref 36.5–49.3)
HGB BLD-MCNC: 12.1 G/DL (ref 12–17)
IMM GRANULOCYTES # BLD AUTO: 0.02 THOUSAND/UL (ref 0–0.2)
IMM GRANULOCYTES NFR BLD AUTO: 0 % (ref 0–2)
LIPASE SERPL-CCNC: 564 U/L (ref 73–393)
LYMPHOCYTES # BLD AUTO: 1.85 THOUSANDS/ÂΜL (ref 0.6–4.47)
LYMPHOCYTES NFR BLD AUTO: 28 % (ref 14–44)
MCH RBC QN AUTO: 32.4 PG (ref 26.8–34.3)
MCHC RBC AUTO-ENTMCNC: 35.3 G/DL (ref 31.4–37.4)
MCV RBC AUTO: 92 FL (ref 82–98)
MONOCYTES # BLD AUTO: 0.57 THOUSAND/ÂΜL (ref 0.17–1.22)
MONOCYTES NFR BLD AUTO: 9 % (ref 4–12)
NEUTROPHILS # BLD AUTO: 4.11 THOUSANDS/ÂΜL (ref 1.85–7.62)
NEUTS SEG NFR BLD AUTO: 62 % (ref 43–75)
NRBC BLD AUTO-RTO: 0 /100 WBCS
P AXIS: 32 DEGREES
PLATELET # BLD AUTO: 244 THOUSANDS/UL (ref 149–390)
PMV BLD AUTO: 9.2 FL (ref 8.9–12.7)
POTASSIUM SERPL-SCNC: 3.3 MMOL/L (ref 3.5–5.3)
PR INTERVAL: 140 MS
PROT SERPL-MCNC: 7.6 G/DL (ref 6.4–8.4)
QRS AXIS: 38 DEGREES
QRSD INTERVAL: 88 MS
QT INTERVAL: 416 MS
QTC INTERVAL: 445 MS
RBC # BLD AUTO: 3.74 MILLION/UL (ref 3.88–5.62)
RSV RNA RESP QL NAA+PROBE: NEGATIVE
SARS-COV-2 RNA RESP QL NAA+PROBE: NEGATIVE
SODIUM SERPL-SCNC: 128 MMOL/L (ref 135–147)
T WAVE AXIS: 32 DEGREES
VENTRICULAR RATE: 69 BPM
WBC # BLD AUTO: 6.58 THOUSAND/UL (ref 4.31–10.16)

## 2022-12-06 RX ORDER — ONDANSETRON 4 MG/1
4 TABLET, FILM COATED ORAL EVERY 6 HOURS
Qty: 12 TABLET | Refills: 0 | Status: SHIPPED | OUTPATIENT
Start: 2022-12-06

## 2022-12-06 RX ORDER — ONDANSETRON 2 MG/ML
4 INJECTION INTRAMUSCULAR; INTRAVENOUS ONCE
Status: COMPLETED | OUTPATIENT
Start: 2022-12-06 | End: 2022-12-06

## 2022-12-06 RX ORDER — MORPHINE SULFATE 4 MG/ML
4 INJECTION, SOLUTION INTRAMUSCULAR; INTRAVENOUS ONCE
Status: COMPLETED | OUTPATIENT
Start: 2022-12-06 | End: 2022-12-06

## 2022-12-06 RX ORDER — OXYCODONE HYDROCHLORIDE AND ACETAMINOPHEN 5; 325 MG/1; MG/1
1 TABLET ORAL EVERY 4 HOURS PRN
Qty: 20 TABLET | Refills: 0 | Status: SHIPPED | OUTPATIENT
Start: 2022-12-06 | End: 2022-12-16

## 2022-12-06 RX ORDER — OMEPRAZOLE 20 MG/1
20 CAPSULE, DELAYED RELEASE ORAL DAILY
Qty: 30 CAPSULE | Refills: 0 | Status: SHIPPED | OUTPATIENT
Start: 2022-12-06

## 2022-12-06 RX ORDER — FAMOTIDINE 10 MG/ML
40 INJECTION, SOLUTION INTRAVENOUS ONCE
Status: COMPLETED | OUTPATIENT
Start: 2022-12-06 | End: 2022-12-06

## 2022-12-06 RX ORDER — LIDOCAINE HYDROCHLORIDE 20 MG/ML
15 SOLUTION OROPHARYNGEAL ONCE
Status: COMPLETED | OUTPATIENT
Start: 2022-12-06 | End: 2022-12-06

## 2022-12-06 RX ADMIN — MORPHINE SULFATE 4 MG: 4 INJECTION INTRAVENOUS at 23:27

## 2022-12-06 RX ADMIN — ONDANSETRON 4 MG: 2 INJECTION INTRAMUSCULAR; INTRAVENOUS at 22:07

## 2022-12-06 RX ADMIN — FAMOTIDINE 40 MG: 10 INJECTION, SOLUTION INTRAVENOUS at 22:07

## 2022-12-06 RX ADMIN — LIDOCAINE HYDROCHLORIDE 15 ML: 20 SOLUTION ORAL; TOPICAL at 22:07

## 2022-12-06 RX ADMIN — SODIUM CHLORIDE 1000 ML: 0.9 INJECTION, SOLUTION INTRAVENOUS at 22:08

## 2022-12-07 VITALS
RESPIRATION RATE: 16 BRPM | OXYGEN SATURATION: 98 % | TEMPERATURE: 98.1 F | WEIGHT: 155 LBS | HEART RATE: 70 BPM | BODY MASS INDEX: 25.79 KG/M2 | SYSTOLIC BLOOD PRESSURE: 170 MMHG | DIASTOLIC BLOOD PRESSURE: 81 MMHG

## 2022-12-07 NOTE — ED PROVIDER NOTES
History  Chief Complaint   Patient presents with   • Flu Symptoms     Pt c/o muscle aches, cough, sweating, nausea, SOB, and fatigue for 2 weeks      This is a 79-year-old  male that presents emergency department with generalized illness with nausea and some abdominal pain ever since his emergency room visit 2 weeks ago  At this time is complaining of abdominal pain with nausea with no vomiting  The pain is midabdominal with no radiation  It is severe and constant its been there all day  Pain is sharp  Past medical history of diabetes, coronary disease with bypass, hypertension, hypercholesterolemia  Sx: CABG  + smoker, + etoh      History provided by:  Patient   used: No    Flu Symptoms  Presenting symptoms: nausea    Presenting symptoms: no cough, no fever, no shortness of breath, no sore throat and no vomiting    Severity:  Severe  Associated symptoms: no chills and no ear pain        Prior to Admission Medications   Prescriptions Last Dose Informant Patient Reported? Taking?    Blood Pressure Monitor MARIELLA   No No   Sig: by Does not apply route daily   Lancets (FREESTYLE) lancets   No No   Sig: Use as instructed   aspirin 81 mg chewable tablet   Yes No   Sig: Chew 81 mg daily   atorvastatin (LIPITOR) 20 mg tablet   No No   Sig: Take 1 tablet (20 mg total) by mouth daily   clopidogrel (PLAVIX) 75 mg tablet   No No   Sig: Take 1 tablet (75 mg total) by mouth daily   ferrous sulfate 325 (65 Fe) mg tablet   Yes No   Sig: Take 325 mg by mouth daily with breakfast   folic acid (FOLVITE) 1 mg tablet   No No   Sig: Take 1 tablet (1 mg total) by mouth daily   gabapentin (NEURONTIN) 100 mg capsule   No No   Sig: take 1 capsule by mouth three times a day   glucose blood test strip   No No   Sig: Use as instructed   glucose monitoring kit (FREESTYLE) monitoring kit   No No   Si each by Does not apply route daily   lisinopril (ZESTRIL) 20 mg tablet   No No   Sig: Take 1 tablet (20 mg total) by mouth daily   mirtazapine (REMERON) 15 mg tablet   No No   Sig: Take 1 tablet (15 mg total) by mouth daily at bedtime   omega-3-acid ethyl esters (LOVAZA) 1 g capsule   Yes No   Sig: Take 2 capsules by mouth 2 (two) times a day   pantoprazole (PROTONIX) 40 mg tablet   No No   Sig: Take 1 tablet (40 mg total) by mouth 2 (two) times a day for 14 days   pregabalin (LYRICA) 300 MG capsule   Yes No   Sig: Take 300 mg by mouth 3 (three) times a day   sitaGLIPtin-metFORMIN (JANUMET)  MG per tablet   No No   Sig: Take 1 tablet by mouth 2 (two) times a day with meals   sucralfate (CARAFATE) 1 g/10 mL suspension   No No   Sig: Take 10 mL (1 g total) by mouth 4 (four) times a day   thiamine 100 MG tablet   No No   Sig: Take 1 tablet (100 mg total) by mouth daily      Facility-Administered Medications: None       Past Medical History:   Diagnosis Date   • Chest pain    • Chronic hepatitis C ([facility] ) 3/15/2013   • Diabetes mellitus ([facility] )    • Heartburn    • Hepatitis C    • Indigestion    • Liver disease    • Myocardial infarction ([facility] )    • Seizures ([facility] )        History reviewed  No pertinent surgical history  Family History   Problem Relation Age of Onset   • Heart attack Mother    • Diabetes Mother    • Colon cancer Father    • Diabetes Father    • Alcohol abuse Neg Hx    • Substance Abuse Neg Hx    • Mental illness Neg Hx    • Depression Neg Hx      I have reviewed and agree with the history as documented  E-Cigarette/Vaping   • E-Cigarette Use Never User      E-Cigarette/Vaping Substances   • Nicotine No    • THC No    • CBD No    • Flavoring No    • Other No    • Unknown No      Social History     Tobacco Use   • Smoking status: Every Day     Packs/day: 0 50     Types: Cigarettes   • Smokeless tobacco: Never   Vaping Use   • Vaping Use: Never used   Substance Use Topics   • Alcohol use:  Yes     Alcohol/week: 21 0 standard drinks     Types: 21 Cans of beer per week   • Drug use: Not Currently     Types: Marijuana       Review of Systems   Constitutional: Negative for chills and fever  HENT: Negative for ear pain and sore throat  Eyes: Negative for pain and visual disturbance  Respiratory: Negative for cough and shortness of breath  Cardiovascular: Negative for chest pain and palpitations  Gastrointestinal: Positive for abdominal pain and nausea  Negative for vomiting  Genitourinary: Negative for dysuria and hematuria  Musculoskeletal: Negative for arthralgias and back pain  Skin: Negative for color change and rash  Neurological: Positive for weakness  Negative for seizures and syncope  All other systems reviewed and are negative  Physical Exam  Physical Exam  Vitals and nursing note reviewed  Constitutional:       General: He is not in acute distress  Appearance: Normal appearance  He is well-developed and normal weight  HENT:      Head: Normocephalic and atraumatic  Right Ear: External ear normal       Left Ear: External ear normal       Nose: Nose normal       Mouth/Throat:      Mouth: Mucous membranes are moist    Eyes:      Extraocular Movements: Extraocular movements intact  Conjunctiva/sclera: Conjunctivae normal       Pupils: Pupils are equal, round, and reactive to light  Cardiovascular:      Rate and Rhythm: Normal rate and regular rhythm  Pulses: Normal pulses  Heart sounds: Normal heart sounds  No murmur heard  Pulmonary:      Effort: Pulmonary effort is normal  No respiratory distress  Breath sounds: Normal breath sounds  Abdominal:      General: Abdomen is flat  Palpations: Abdomen is soft  Tenderness: There is abdominal tenderness  There is no guarding or rebound  Hernia: No hernia is present  Musculoskeletal:         General: No swelling  Cervical back: Neck supple  Skin:     General: Skin is warm and dry  Capillary Refill: Capillary refill takes less than 2 seconds     Neurological:      General: No focal deficit present  Mental Status: He is alert and oriented to person, place, and time  Psychiatric:         Mood and Affect: Mood normal          Thought Content:  Thought content normal          Judgment: Judgment normal          Vital Signs  ED Triage Vitals   Temperature Pulse Respirations Blood Pressure SpO2   12/06/22 2127 12/06/22 2127 12/06/22 2127 12/06/22 2127 12/06/22 2127   98 1 °F (36 7 °C) 76 20 (!) 183/89 98 %      Temp src Heart Rate Source Patient Position - Orthostatic VS BP Location FiO2 (%)   -- -- -- -- --             Pain Score       12/06/22 2327       10 - Worst Possible Pain           Vitals:    12/06/22 2127   BP: (!) 183/89   Pulse: 76         Visual Acuity      ED Medications  Medications   sodium chloride 0 9 % bolus 1,000 mL (1,000 mL Intravenous New Bag 12/6/22 2208)   ondansetron (ZOFRAN) injection 4 mg (4 mg Intravenous Given 12/6/22 2207)   Famotidine (PF) (PEPCID) injection 40 mg (40 mg Intravenous Given 12/6/22 2207)   Lidocaine Viscous HCl (XYLOCAINE) 2 % mucosal solution 15 mL (15 mL Swish & Spit Given 12/6/22 2207)   morphine injection 4 mg (4 mg Intravenous Given 12/6/22 2327)       Diagnostic Studies  Results Reviewed     Procedure Component Value Units Date/Time    Lipase [[id number]]  (Abnormal) Collected: 12/06/22 2206    Lab Status: Final result Specimen: Blood from Arm, Left Updated: 12/06/22 2251     Lipase 564 u/L     Comprehensive metabolic panel [[id number]]  (Abnormal) Collected: 12/06/22 2206    Lab Status: Final result Specimen: Blood from Arm, Left Updated: 12/06/22 2251     Sodium 128 mmol/L      Potassium 3 3 mmol/L      Chloride 90 mmol/L      CO2 26 mmol/L      ANION GAP 12 mmol/L      BUN 9 mg/dL      Creatinine 1 22 mg/dL      Glucose 260 mg/dL      Calcium 8 7 mg/dL      AST 24 U/L      ALT 62 U/L      Alkaline Phosphatase 77 U/L      Total Protein 7 6 g/dL      Albumin 3 5 g/dL      Total Bilirubin 0 31 mg/dL      eGFR 56 ml/min/1 73sq m     Narrative: National Kidney Disease Foundation guidelines for Chronic Kidney Disease (CKD):   •  Stage 1 with normal or high GFR (GFR > 90 mL/min/1 73 square meters)  •  Stage 2 Mild CKD (GFR = 60-89 mL/min/1 73 square meters)  •  Stage 3A Moderate CKD (GFR = 45-59 mL/min/1 73 square meters)  •  Stage 3B Moderate CKD (GFR = 30-44 mL/min/1 73 square meters)  •  Stage 4 Severe CKD (GFR = 15-29 mL/min/1 73 square meters)  •  Stage 5 End Stage CKD (GFR <15 mL/min/1 73 square meters)  Note: GFR calculation is accurate only with a steady state creatinine    HS Troponin I 2hr [[id number]]     Lab Status: No result Specimen: Blood     HS Troponin 0hr (reflex protocol) [[id number]]  (Normal) Collected: 12/06/22 2206    Lab Status: Final result Specimen: Blood from Arm, Left Updated: 12/06/22 2243     hs TnI 0hr 5 ng/L     CBC and differential [[id number]]  (Abnormal) Collected: 12/06/22 2206    Lab Status: Final result Specimen: Blood from Arm, Left Updated: 12/06/22 2219     WBC 6 58 Thousand/uL      RBC 3 74 Million/uL      Hemoglobin 12 1 g/dL      Hematocrit 34 3 %      MCV 92 fL      MCH 32 4 pg      MCHC 35 3 g/dL      RDW 14 1 %      MPV 9 2 fL      Platelets 792 Thousands/uL      nRBC 0 /100 WBCs      Neutrophils Relative 62 %      Immat GRANS % 0 %      Lymphocytes Relative 28 %      Monocytes Relative 9 %      Eosinophils Relative 1 %      Basophils Relative 0 %      Neutrophils Absolute 4 11 Thousands/µL      Immature Grans Absolute 0 02 Thousand/uL      Lymphocytes Absolute 1 85 Thousands/µL      Monocytes Absolute 0 57 Thousand/µL      Eosinophils Absolute 0 03 Thousand/µL      Basophils Absolute 0 00 Thousands/µL     FLU/RSV/COVID - if FLU/RSV clinically relevant [[id number]]  (Normal) Collected: 12/06/22 2134    Lab Status: Final result Specimen: Nares from Nose Updated: 12/06/22 2217     SARS-CoV-2 Negative     INFLUENZA A PCR Negative     INFLUENZA B PCR Negative     RSV PCR Negative    Narrative:      FOR PEDIATRIC PATIENTS - copy/paste COVID Guidelines URL to browser: [email or web address]    SARS-CoV-2 assay is a Nucleic Acid Amplification assay intended for the  qualitative detection of nucleic acid from SARS-CoV-2 in nasopharyngeal  swabs  Results are for the presumptive identification of SARS-CoV-2 RNA  Positive results are indicative of infection with SARS-CoV-2, the virus  causing COVID-19, but do not rule out bacterial infection or co-infection  with other viruses  Laboratories within the [place] and its  territories are required to report all positive results to the appropriate  public health authorities  Negative results do not preclude SARS-CoV-2  infection and should not be used as the sole basis for treatment or other  patient management decisions  Negative results must be combined with  clinical observations, patient history, and epidemiological information  This test has not been FDA cleared or approved  This test has been authorized by FDA under an Emergency Use Authorization  (EUA)  This test is only authorized for the duration of time the  declaration that circumstances exist justifying the authorization of the  emergency use of an in vitro diagnostic tests for detection of SARS-CoV-2  virus and/or diagnosis of COVID-19 infection under section 564(b)(1) of  the Act, 21 U  S C  173HCT-1(N)(7), unless the authorization is terminated  or revoked sooner  The test has been validated but independent review by FDA  and CLIA is pending  Test performed using [employer] GeneXpert: This RT-PCR assay targets N2,  a region unique to SARS-CoV-2  A conserved region in the E-gene was chosen  for pan-Sarbecovirus detection which includes SARS-CoV-2  According to CMS-2020-01-R, this platform meets the definition of high-throughput technology                   CT abdomen pelvis wo contrast   Final Result by Saurav Mcelroy MD (12/06 6772)      Findings consistent with acute uncomplicated pancreatitis      Mildly dilated appendix without significant periappendiceal inflammatory change  Overall appearance is similar to CT examination of 9/25/2020            Workstation performed: [id number]                    Procedures  Procedures         ED Course  ED Course as of 12/06/22 2356   Tue Dec 06, 2022   2228 SARS-COV-2: Negative   2228 INFLU A PCR: Negative   2228 INFLU B PCR: Negative   2228 RSV PCR: Negative   2228 WBC: 6 58   2228 Hemoglobin: 12 1   2240 WBC: 6 58   2240 Hemoglobin: 12 1   2240 HCT(!): 34 3   2323 Lipase(!): 564   2330 Persistent hyponatremia , high lipase               Identification of Seniors at [address] [place] [address] Most Recent Value   (ISAR) Identification of Seniors at Risk    Before the illness or injury that brought you to the Emergency, did you need someone to help you on a regular basis? 0 Filed at: 12/06/2022 2129   In the last 24 hours, have you needed more help than usual? 0 Filed at: 12/06/2022 2129   Have you been hospitalized for one or more nights during the past 6 months? 0 Filed at: 12/06/2022 2129   In general, do you see well? 0 Filed at: 12/06/2022 2129   In general, do you have serious problems with your memory? 0 Filed at: 12/06/2022 2129   Do you take more than three different medications every day? 1 Filed at: 12/06/2022 2129   ISAR Score 1 Filed at: 12/06/2022 2129                      SBIRT 22yo+    Flowsheet Row Most Recent Value   SBIRT (23 yo +)    In order to provide better care to our patients, we are screening all of our patients for alcohol and drug use  Would it be okay to ask you these screening questions?  No Filed at: 12/06/2022 2217                    MDM  Number of Diagnoses or Management Options     Amount and/or Complexity of Data Reviewed  Clinical lab tests: ordered and reviewed  Tests in the radiology section of CPT®: ordered and reviewed    Risk of Complications, Morbidity, and/or Mortality  Presenting problems: moderate  Diagnostic procedures: moderate  Management options: moderate  General comments: After pain medication and IV fluids patient doing much better  No vomiting in the emergency department  Events of that patient did have pancreatitis many years ago  Admits to alcohol consumption  Patient Progress  Patient progress: improved      Disposition  Final diagnoses:   Pancreatitis     Time reflects when diagnosis was documented in both MDM as applicable and the Disposition within this note     Time User Action Codes Description Comment    12/6/2022 11:54 PM Romeo Aschoff Add [K85 [id number]] Pancreatitis       ED Disposition     ED Disposition   Discharge    Condition   Stable    Date/Time   Tue Dec 6, 2022 11:54 PM    [address] discharge to home/self care  Follow-up Information    None         Patient's Medications   Discharge Prescriptions    OMEPRAZOLE (PRILOSEC) 20 MG DELAYED RELEASE CAPSULE    Take 1 capsule (20 mg total) by mouth daily       Start Date: 12/6/2022 End Date: --       Order Dose: 20 mg       Quantity: 30 capsule    Refills: 0    ONDANSETRON (ZOFRAN) 4 MG TABLET    Take 1 tablet (4 mg total) by mouth every 6 (six) hours       Start Date: 12/6/2022 End Date: --       Order Dose: 4 mg       Quantity: 12 tablet    Refills: 0    OXYCODONE-ACETAMINOPHEN (PERCOCET) 5-325 MG PER TABLET    Take 1 tablet by mouth every 4 (four) hours as needed for moderate pain for up to 10 days Max Daily Amount: 6 tablets       Start Date: 12/6/2022 End Date: 12/16/2022       Order Dose: 1 tablet       Quantity: 20 tablet    Refills: 0       No discharge procedures on file      PDMP Review     None          ED Provider  Electronically Signed by           Jorge A Amaya MD  12/06/22 0826

## 2022-12-07 NOTE — DISCHARGE INSTRUCTIONS
A  personal message from Dr Cachorro Mackey,  Thank you so much for allowing me to care for you today  I pride myself in the care and attention I give all my patients  I hope you were a witness to this tonight  If for any reason your condition does not improve, worsens, or you have a question that was not answered during your visit you can feel free to text me on my personal phone   # [phone]  I will answer to your message and continue your care past your emergency room visit

## 2023-06-05 ENCOUNTER — HOSPITAL ENCOUNTER (EMERGENCY)
Facility: HOSPITAL | Age: 78
Discharge: HOME | End: 2023-06-05
Attending: EMERGENCY MEDICINE
Payer: COMMERCIAL

## 2023-06-05 ENCOUNTER — APPOINTMENT (EMERGENCY)
Dept: CT IMAGING | Facility: HOSPITAL | Age: 78
End: 2023-06-05
Payer: COMMERCIAL

## 2023-06-05 VITALS
TEMPERATURE: 98 F | DIASTOLIC BLOOD PRESSURE: 82 MMHG | OXYGEN SATURATION: 97 % | HEART RATE: 64 BPM | RESPIRATION RATE: 16 BRPM | SYSTOLIC BLOOD PRESSURE: 131 MMHG

## 2023-06-05 DIAGNOSIS — K85.20 ALCOHOL-INDUCED ACUTE PANCREATITIS WITHOUT INFECTION OR NECROSIS: Primary | ICD-10-CM

## 2023-06-05 DIAGNOSIS — K59.00 CONSTIPATION, UNSPECIFIED CONSTIPATION TYPE: ICD-10-CM

## 2023-06-05 LAB
ALBUMIN SERPL BCP-MCNC: 4.7 G/DL (ref 3.5–5)
ALP SERPL-CCNC: 50 U/L (ref 34–104)
ALT SERPL W P-5'-P-CCNC: 15 U/L (ref 7–52)
ANION GAP SERPL CALCULATED.3IONS-SCNC: 7 MMOL/L (ref 4–13)
AST SERPL W P-5'-P-CCNC: 19 U/L (ref 13–39)
ATRIAL RATE: 68 BPM
BASOPHILS # BLD AUTO: 0.02 THOUSANDS/ÂΜL (ref 0–0.1)
BASOPHILS NFR BLD AUTO: 0 % (ref 0–1)
BILIRUB SERPL-MCNC: 0.43 MG/DL (ref 0.2–1)
BUN SERPL-MCNC: 25 MG/DL (ref 5–25)
CALCIUM SERPL-MCNC: 10.1 MG/DL (ref 8.4–10.2)
CARDIAC TROPONIN I PNL SERPL HS: 3 NG/L
CHLORIDE SERPL-SCNC: 104 MMOL/L (ref 96–108)
CO2 SERPL-SCNC: 26 MMOL/L (ref 21–32)
CREAT SERPL-MCNC: 1.38 MG/DL (ref 0.6–1.3)
EOSINOPHIL # BLD AUTO: 0.12 THOUSAND/ÂΜL (ref 0–0.61)
EOSINOPHIL NFR BLD AUTO: 2 % (ref 0–6)
ERYTHROCYTE [DISTWIDTH] IN BLOOD BY AUTOMATED COUNT: 15.9 % (ref 11.6–15.1)
GFR SERPL CREATININE-BSD FRML MDRD: 48 ML/MIN/1.73SQ M
GLUCOSE SERPL-MCNC: 121 MG/DL (ref 65–140)
HCT VFR BLD AUTO: 38.9 % (ref 36.5–49.3)
HGB BLD-MCNC: 13 G/DL (ref 12–17)
IMM GRANULOCYTES # BLD AUTO: 0.01 THOUSAND/UL (ref 0–0.2)
IMM GRANULOCYTES NFR BLD AUTO: 0 % (ref 0–2)
LIPASE SERPL-CCNC: 115 U/L (ref 11–82)
LYMPHOCYTES # BLD AUTO: 1.74 THOUSANDS/ÂΜL (ref 0.6–4.47)
LYMPHOCYTES NFR BLD AUTO: 30 % (ref 14–44)
MAGNESIUM SERPL-MCNC: 1.9 MG/DL (ref 1.9–2.7)
MCH RBC QN AUTO: 30.8 PG (ref 26.8–34.3)
MCHC RBC AUTO-ENTMCNC: 33.4 G/DL (ref 31.4–37.4)
MCV RBC AUTO: 92 FL (ref 82–98)
MONOCYTES # BLD AUTO: 0.48 THOUSAND/ÂΜL (ref 0.17–1.22)
MONOCYTES NFR BLD AUTO: 8 % (ref 4–12)
NEUTROPHILS # BLD AUTO: 3.37 THOUSANDS/ÂΜL (ref 1.85–7.62)
NEUTS SEG NFR BLD AUTO: 60 % (ref 43–75)
NRBC BLD AUTO-RTO: 0 /100 WBCS
P AXIS: 40 DEGREES
PLATELET # BLD AUTO: 218 THOUSANDS/UL (ref 149–390)
PMV BLD AUTO: 10.3 FL (ref 8.9–12.7)
POTASSIUM SERPL-SCNC: 4.2 MMOL/L (ref 3.5–5.3)
PR INTERVAL: 156 MS
PROT SERPL-MCNC: 8 G/DL (ref 6.4–8.4)
QRS AXIS: 37 DEGREES
QRSD INTERVAL: 90 MS
QT INTERVAL: 384 MS
QTC INTERVAL: 408 MS
RBC # BLD AUTO: 4.22 MILLION/UL (ref 3.88–5.62)
SODIUM SERPL-SCNC: 137 MMOL/L (ref 135–147)
T WAVE AXIS: 32 DEGREES
VENTRICULAR RATE: 68 BPM
WBC # BLD AUTO: 5.74 THOUSAND/UL (ref 4.31–10.16)

## 2023-06-05 PROCEDURE — 93005 ELECTROCARDIOGRAM TRACING: CPT

## 2023-06-05 PROCEDURE — 83735 ASSAY OF MAGNESIUM: CPT | Performed by: PHYSICIAN ASSISTANT

## 2023-06-05 PROCEDURE — 96375 TX/PRO/DX INJ NEW DRUG ADDON: CPT

## 2023-06-05 PROCEDURE — 99284 EMERGENCY DEPT VISIT MOD MDM: CPT

## 2023-06-05 PROCEDURE — 36415 COLL VENOUS BLD VENIPUNCTURE: CPT | Performed by: PHYSICIAN ASSISTANT

## 2023-06-05 PROCEDURE — 96361 HYDRATE IV INFUSION ADD-ON: CPT

## 2023-06-05 PROCEDURE — 96374 THER/PROPH/DIAG INJ IV PUSH: CPT

## 2023-06-05 PROCEDURE — G1004 CDSM NDSC: HCPCS

## 2023-06-05 PROCEDURE — 83690 ASSAY OF LIPASE: CPT | Performed by: PHYSICIAN ASSISTANT

## 2023-06-05 PROCEDURE — 84484 ASSAY OF TROPONIN QUANT: CPT | Performed by: PHYSICIAN ASSISTANT

## 2023-06-05 PROCEDURE — 74177 CT ABD & PELVIS W/CONTRAST: CPT

## 2023-06-05 PROCEDURE — 93010 ELECTROCARDIOGRAM REPORT: CPT | Performed by: INTERNAL MEDICINE

## 2023-06-05 PROCEDURE — 85025 COMPLETE CBC W/AUTO DIFF WBC: CPT | Performed by: PHYSICIAN ASSISTANT

## 2023-06-05 PROCEDURE — 80053 COMPREHEN METABOLIC PANEL: CPT | Performed by: PHYSICIAN ASSISTANT

## 2023-06-05 RX ORDER — MORPHINE SULFATE 4 MG/ML
4 INJECTION, SOLUTION INTRAMUSCULAR; INTRAVENOUS ONCE
Status: COMPLETED | OUTPATIENT
Start: 2023-06-05 | End: 2023-06-05

## 2023-06-05 RX ORDER — OXYCODONE HYDROCHLORIDE AND ACETAMINOPHEN 5; 325 MG/1; MG/1
1 TABLET ORAL EVERY 6 HOURS PRN
Qty: 18 TABLET | Refills: 0 | Status: SHIPPED | OUTPATIENT
Start: 2023-06-05 | End: 2023-06-05 | Stop reason: SDUPTHER

## 2023-06-05 RX ORDER — ONDANSETRON 4 MG/1
4 TABLET, FILM COATED ORAL EVERY 6 HOURS
Qty: 12 TABLET | Refills: 0 | Status: SHIPPED | OUTPATIENT
Start: 2023-06-05

## 2023-06-05 RX ORDER — OXYCODONE HYDROCHLORIDE AND ACETAMINOPHEN 5; 325 MG/1; MG/1
1 TABLET ORAL EVERY 6 HOURS PRN
Qty: 18 TABLET | Refills: 0 | Status: SHIPPED | OUTPATIENT
Start: 2023-06-05

## 2023-06-05 RX ORDER — KETOROLAC TROMETHAMINE 30 MG/ML
15 INJECTION, SOLUTION INTRAMUSCULAR; INTRAVENOUS ONCE
Status: COMPLETED | OUTPATIENT
Start: 2023-06-05 | End: 2023-06-05

## 2023-06-05 RX ADMIN — KETOROLAC TROMETHAMINE 15 MG: 30 INJECTION, SOLUTION INTRAMUSCULAR; INTRAVENOUS at 02:07

## 2023-06-05 RX ADMIN — IOHEXOL 100 ML: 350 INJECTION, SOLUTION INTRAVENOUS at 02:46

## 2023-06-05 RX ADMIN — MORPHINE SULFATE 4 MG: 4 INJECTION INTRAVENOUS at 01:59

## 2023-06-05 RX ADMIN — SODIUM CHLORIDE 1000 ML: 0.9 INJECTION, SOLUTION INTRAVENOUS at 01:58

## 2023-06-05 NOTE — DISCHARGE INSTRUCTIONS
Please follow-up with the gastroenterologist as instructed  Please call for a follow-up with your primary care physician within the next available appointment  Please take prescribed medications as instructed  Please return to the ED for any new or worsening of symptoms

## 2023-06-05 NOTE — ED PROVIDER NOTES
History  Chief Complaint   Patient presents with   • Abdominal Pain     Pt arrived ambulatory with c/o abdominal pain for an unknown amount of time  Pt has hx of same complaints     Patient is a 68years old male with a past medical history of hypertension, hyperlipidemia, type 2 diabetes and alcoholic pancreatitis who presents to the ED today for evaluation of a 5 days history of a mild upper abdominal pain that has been constant that began after consuming alcohol  Patient stated that 5 days ago, mistakingly drank beer and subsequently afterwards, began having abdominal pain  Admits abdominal pain initially improved with Marisol-Landers  Admits currently unable to eat due to worsening of pain  Patient admits current symptoms consistent with his prior history of pancreatitis  Denies any chest pain, fever, chills, nausea, vomiting or any other complaint on review of systems  History provided by:  Parent   used: No    Abdominal Pain  Associated symptoms: no chest pain, no chills, no constipation, no cough, no diarrhea, no dysuria, no fever, no hematuria, no nausea, no shortness of breath, no sore throat and no vomiting        Prior to Admission Medications   Prescriptions Last Dose Informant Patient Reported? Taking?    Blood Pressure Monitor MARIELLA  Self No No   Sig: by Does not apply route daily   Lancets (FREESTYLE) lancets  Self No No   Sig: Use as instructed   aspirin 81 mg chewable tablet  Self Yes No   Sig: Chew 81 mg daily   atorvastatin (LIPITOR) 20 mg tablet  Self No No   Sig: Take 1 tablet (20 mg total) by mouth daily   clopidogrel (PLAVIX) 75 mg tablet  Self No No   Sig: Take 1 tablet (75 mg total) by mouth daily   ferrous sulfate 325 (65 Fe) mg tablet  Self Yes No   Sig: Take 325 mg by mouth daily with breakfast   folic acid (FOLVITE) 1 mg tablet  Self No No   Sig: Take 1 tablet (1 mg total) by mouth daily   gabapentin (NEURONTIN) 100 mg capsule   No No   Sig: take 1 capsule by mouth three times a day   glucose blood test strip  Self No No   Sig: Use as instructed   glucose monitoring kit (FREESTYLE) monitoring kit  Self No No   Si each by Does not apply route daily   lisinopril (ZESTRIL) 20 mg tablet  Self No No   Sig: Take 1 tablet (20 mg total) by mouth daily   mirtazapine (REMERON) 15 mg tablet  Self No No   Sig: Take 1 tablet (15 mg total) by mouth daily at bedtime   omega-3-acid ethyl esters (LOVAZA) 1 g capsule  Self Yes No   Sig: Take 2 capsules by mouth 2 (two) times a day   omeprazole (PriLOSEC) 20 mg delayed release capsule   No No   Sig: Take 1 capsule (20 mg total) by mouth daily   ondansetron (ZOFRAN) 4 mg tablet   No No   Sig: Take 1 tablet (4 mg total) by mouth every 6 (six) hours   pantoprazole (PROTONIX) 40 mg tablet   No No   Sig: Take 1 tablet (40 mg total) by mouth 2 (two) times a day for 14 days   pregabalin (LYRICA) 300 MG capsule  Self Yes No   Sig: Take 300 mg by mouth 3 (three) times a day   sitaGLIPtin-metFORMIN (JANUMET)  MG per tablet  Self No No   Sig: Take 1 tablet by mouth 2 (two) times a day with meals   sucralfate (CARAFATE) 1 g/10 mL suspension  Self No No   Sig: Take 10 mL (1 g total) by mouth 4 (four) times a day   thiamine 100 MG tablet  Self No No   Sig: Take 1 tablet (100 mg total) by mouth daily      Facility-Administered Medications: None       Past Medical History:   Diagnosis Date   • Chest pain    • Chronic hepatitis C ([facility] ) 3/15/2013   • Diabetes mellitus (HCC)    • Heartburn    • Hepatitis C    • Indigestion    • Liver disease    • Myocardial infarction ([facility] [address] )    • Seizures ([facility] [address] )        History reviewed  No pertinent surgical history      Family History   Problem Relation Age of Onset   • Heart attack Mother    • Diabetes Mother    • Colon cancer Father    • Diabetes Father    • Alcohol abuse Neg Hx    • Substance Abuse Neg Hx    • Mental illness Neg Hx    • Depression Neg Hx      I have reviewed and agree with the history as documented  E-Cigarette/Vaping   • E-Cigarette Use Never User      E-Cigarette/Vaping Substances   • Nicotine No    • THC No    • CBD No    • Flavoring No    • Other No    • Unknown No      Social History     Tobacco Use   • Smoking status: Every Day     Packs/day: 0 50     Types: Cigarettes   • Smokeless tobacco: Never   Vaping Use   • Vaping Use: Never used   Substance Use Topics   • Alcohol use: Yes     Alcohol/week: 21 0 standard drinks of alcohol     Types: 21 Cans of beer per week   • Drug use: Not Currently     Types: Marijuana       Review of Systems   Constitutional: Negative for chills and fever  HENT: Negative for ear pain and sore throat  Eyes: Negative for pain and visual disturbance  Respiratory: Negative for cough and shortness of breath  Cardiovascular: Negative for chest pain and palpitations  Gastrointestinal: Positive for abdominal pain  Negative for constipation, diarrhea, nausea and vomiting  Genitourinary: Negative for dysuria and hematuria  Musculoskeletal: Negative for arthralgias and back pain  Skin: Negative for color change and rash  Neurological: Negative for seizures and syncope  All other systems reviewed and are negative  Physical Exam  Physical Exam  Vitals and nursing note reviewed  Constitutional:       General: He is not in acute distress  Appearance: He is well-developed  HENT:      Head: Normocephalic and atraumatic  Eyes:      Conjunctiva/sclera: Conjunctivae normal    Cardiovascular:      Rate and Rhythm: Normal rate and regular rhythm  Heart sounds: No murmur heard  Pulmonary:      Effort: Pulmonary effort is normal  No respiratory distress  Breath sounds: Normal breath sounds  Abdominal:      General: There is no distension or abdominal bruit  Palpations: Abdomen is soft  Tenderness: There is abdominal tenderness in the right upper quadrant, epigastric area and left upper quadrant   There is no right CVA tenderness or left CVA tenderness  Hernia: No hernia is present  Musculoskeletal:         General: No swelling  Cervical back: Neck supple  Skin:     General: Skin is warm and dry  Capillary Refill: Capillary refill takes less than 2 seconds  Neurological:      Mental Status: He is alert     Psychiatric:         Mood and Affect: Mood normal          Vital Signs  ED Triage Vitals [06/05/23 0134]   Temperature Pulse Respirations Blood Pressure SpO2   98 °F (36 7 °C) 70 20 (!) 179/79 99 %      Temp Source Heart Rate Source Patient Position - Orthostatic VS BP Location FiO2 (%)   Oral Monitor Sitting Right arm --      Pain Score       --           Vitals:    06/05/23 0134 06/05/23 0300 06/05/23 0400   BP: (!) 179/79 169/72 131/82   Pulse: 70 72 64   Patient Position - Orthostatic VS: Sitting           Visual Acuity  Visual Acuity    Flowsheet Row Most Recent Value   L Pupil Size (mm) 3   R Pupil Size (mm) 3          ED Medications  Medications   ketorolac (TORADOL) injection 15 mg (15 mg Intravenous Given 6/5/23 0207)   morphine injection 4 mg (4 mg Intravenous Given 6/5/23 0159)   sodium chloride 0 9 % bolus 1,000 mL (0 mL Intravenous Stopped 6/5/23 0424)   iohexol (OMNIPAQUE) 350 MG/ML injection (SINGLE-DOSE) 100 mL (100 mL Intravenous Given 6/5/23 0246)       Diagnostic Studies  Results Reviewed     Procedure Component Value Units Date/Time    HS Troponin 0hr (reflex protocol) [[id number]]  (Normal) Collected: 06/05/23 0155    Lab Status: Final result Specimen: Blood from Arm, Right Updated: 06/05/23 0229     hs TnI 0hr 3 ng/L     CMP [[id number]]  (Abnormal) Collected: 06/05/23 0155    Lab Status: Final result Specimen: Blood from Arm, Right Updated: 06/05/23 0216     Sodium 137 mmol/L      Potassium 4 2 mmol/L      Chloride 104 mmol/L      CO2 26 mmol/L      ANION GAP 7 mmol/L      BUN 25 mg/dL      Creatinine 1 38 mg/dL      Glucose 121 mg/dL      Calcium 10 1 mg/dL      AST 19 U/L      ALT 15 U/L Alkaline Phosphatase 50 U/L      Total Protein 8 0 g/dL      Albumin 4 7 g/dL      Total Bilirubin 0 43 mg/dL      eGFR 48 ml/min/1 73sq m     Narrative:      [place] guidelines for Chronic Kidney Disease (CKD):   •  Stage 1 with normal or high GFR (GFR > 90 mL/min/1 73 square meters)  •  Stage 2 Mild CKD (GFR = 60-89 mL/min/1 73 square meters)  •  Stage 3A Moderate CKD (GFR = 45-59 mL/min/1 73 square meters)  •  Stage 3B Moderate CKD (GFR = 30-44 mL/min/1 73 square meters)  •  Stage 4 Severe CKD (GFR = 15-29 mL/min/1 73 square meters)  •  Stage 5 End Stage CKD (GFR <15 mL/min/1 73 square meters)  Note: GFR calculation is accurate only with a steady state creatinine    Lipase [[id number]]  (Abnormal) Collected: 06/05/23 0155    Lab Status: Final result Specimen: Blood from Arm, Right Updated: 06/05/23 0216     Lipase 115 u/L     Magnesium [[id number]]  (Normal) Collected: 06/05/23 0155    Lab Status: Final result Specimen: Blood from Arm, Right Updated: 06/05/23 0216     Magnesium 1 9 mg/dL     CBC and differential [[id number]]  (Abnormal) Collected: 06/05/23 0155    Lab Status: Final result Specimen: Blood from Arm, Right Updated: 06/05/23 0200     WBC 5 74 Thousand/uL      RBC 4 22 Million/uL      Hemoglobin 13 0 g/dL      Hematocrit 38 9 %      MCV 92 fL      MCH 30 8 pg      MCHC 33 4 g/dL      RDW 15 9 %      MPV 10 3 fL      Platelets 443 Thousands/uL      nRBC 0 /100 WBCs      Neutrophils Relative 60 %      Immat GRANS % 0 %      Lymphocytes Relative 30 %      Monocytes Relative 8 %      Eosinophils Relative 2 %      Basophils Relative 0 %      Neutrophils Absolute 3 37 Thousands/µL      Immature Grans Absolute 0 01 Thousand/uL      Lymphocytes Absolute 1 74 Thousands/µL      Monocytes Absolute 0 48 Thousand/µL      Eosinophils Absolute 0 12 Thousand/µL      Basophils Absolute 0 02 Thousands/µL                  CT Abdomen pelvis with contrast   Final Result by Osei Reed DO (06/05 7061)      Findings suggestive of acute pancreatitis  Moderate colonic fecal retention which can be seen in the setting of constipation  The study was marked in [facility] for immediate notification  Workstation performed: [id number]                    Procedures  ECG 12 Lead Documentation Only    Date/Time: 6/5/2023 2:54 AM    Performed by: Trinity Lafleur PA-C  Authorized by: Trinity Lafleur PA-C    Indications / Diagnosis:  Upper abdominal pain  ECG reviewed by me, the ED Provider: yes    Patient location:  ED  Previous ECG:     Previous ECG:  Compared to current    Comparison ECG info:  12/06/2023    Similarity:  No change  Interpretation:     Interpretation: normal    Rate:     ECG rate:  68    ECG rate assessment: normal    Rhythm:     Rhythm: sinus rhythm    Ectopy:     Ectopy: none    QRS:     QRS axis:  Normal    QRS intervals:  Normal  Conduction:     Conduction: normal    ST segments:     ST segments:  Normal  T waves:     T waves: normal    Comments:      EKG obtained showed a normal sinus rhythm with a ventricular rate of 68 bpm, MS interval 156ms, QRS duration 90ms with no ST elevation or ST depression  ED Course  ED Course as of 06/05/23 0450   Mon Jun 05, 2023   0403 CT Abdomen pelvis with contrast  IMPRESSION:     Findings suggestive of acute pancreatitis      Moderate colonic fecal retention which can be seen in the setting of constipation      The study was marked in [facility] for immediate notification      Workstation performed: [id number]   9952 Patient pancreatitis severity score was 1 as a result, outpatient treatment indicated  Patient further requesting to be discharged and admits feels much better  0441 Lipase(!): 115   0442 Creatinine(!): 1 38           SBIRT 22yo+    Flowsheet Row Most Recent Value   Initial Alcohol Screen: US AUDIT-C     1  How often do you have a drink containing alcohol? 0 Filed at: 06/05/2023 0134   2   How many drinks containing alcohol do you have on a typical day you are drinking? 0 Filed at: 06/05/2023 0134   3a  Male UNDER 65: How often do you have five or more drinks on one occasion? 0 Filed at: 06/05/2023 0134   Audit-C Score 0 Filed at: 06/05/2023 0134   MICHELLE: How many times in the past year have you    Used an illegal drug or used a prescription medication for non-medical reasons? Never Filed at: 06/05/2023 0134            Medical Decision Making  On exam, patient was afebrile with mildly diffuse upper abdominal tenderness to palpation with no rebound or guarding  Abdomen was otherwise soft with normal bowel sounds  No Escamilla sign appreciated  No other acute findings  After discussing with patient, patient informs me that symptoms consistent with his prior history of acute pancreatitis  Patient was given 15 mg of Toradol and 4 mg of morphine IV in addition to 1000 mL of normal saline pending diagnostic work-up  Diagnostic work-up came back with a negative troponin  EKG showed no ischemic changes  Creatinine 1 38 and a lipase 115 with a CT scan showing mild pancreatitis with constipation with no necrosis  Patient was reevaluated and admits complete resolution of his pain and requesting to be discharged  Patient's pancreatitis severity score was calculated and was very low as a result, patient was safe for discharge  After counseling patient extensively regarding abstinence from alcohol consumption, patient was discharged home with a prescription for analgesic and antinausea medication as well as a referral to a gastroenterologist   Ivon Ware to return to the ED for any new or worsening of symptoms  A prescription for a stool softener was given to patient upon discharge  Amount and/or Complexity of Data Reviewed  Labs: ordered  Decision-making details documented in ED Course  Details: CBC, CMP, troponin, lipase  Radiology: ordered  Decision-making details documented in ED Course       Details: CT abdomen and pelvis with IV contrast  ECG/medicine tests: ordered  Decision-making details documented in ED Course  Details: EKG      Risk  OTC drugs  Prescription drug management  Disposition  Final diagnoses:   Alcohol-induced acute pancreatitis without infection or necrosis   Constipation, unspecified constipation type     Time reflects when diagnosis was documented in both MDM as applicable and the Disposition within this note     Time User Action Codes Description Comment    6/5/2023  4:36 AM Heidy Boyce Add [K85 20] Alcohol-induced acute pancreatitis without infection or necrosis     6/5/2023  4:36 AM FranckirisDank ashtonlamont Add [K59 00] Constipation, unspecified constipation type       ED Disposition     ED Disposition   Discharge    Condition   Stable    Date/Time   Mon Jun 5, 2023  Kevyn 6825 discharge to home/self care                 Follow-up Information     Follow up With Specialties Details Why [address] [facility] Medicine Call in 1 day  [address]  [phone]            Patient's Medications   Discharge Prescriptions    MAGNESIUM HYDROXIDE (MILK OF MAGNESIA) 400 MG/5 ML ORAL SUSPENSION    Take 30 mL by mouth daily as needed for constipation       Start Date: 6/5/2023  End Date: --       Order Dose: 30 mL       Quantity: 354 mL    Refills: 0    ONDANSETRON (ZOFRAN) 4 MG TABLET    Take 1 tablet (4 mg total) by mouth every 6 (six) hours       Start Date: 6/5/2023  End Date: --       Order Dose: 4 mg       Quantity: 12 tablet    Refills: 0    OXYCODONE-ACETAMINOPHEN (PERCOCET) 5-325 MG PER TABLET    Take 1 tablet by mouth every 6 (six) hours as needed for moderate pain Max Daily Amount: 4 tablets       Start Date: 6/5/2023  End Date: --       Order Dose: 1 tablet       Quantity: 18 tablet    Refills: 0           PDMP Review     None          ED Provider  Electronically Signed by           Keya Villafana NOHEMI  06/05/23 0450

## 2023-08-18 ENCOUNTER — HOSPITAL ENCOUNTER (INPATIENT)
Facility: HOSPITAL | Age: 78
LOS: 2 days | Discharge: HOME | End: 2023-08-20
Attending: EMERGENCY MEDICINE | Admitting: STUDENT IN AN ORGANIZED HEALTH CARE EDUCATION/TRAINING PROGRAM
Payer: COMMERCIAL

## 2023-08-18 ENCOUNTER — APPOINTMENT (EMERGENCY)
Dept: CT IMAGING | Facility: HOSPITAL | Age: 78
End: 2023-08-18
Payer: COMMERCIAL

## 2023-08-18 DIAGNOSIS — R10.13 EPIGASTRIC PAIN: ICD-10-CM

## 2023-08-18 DIAGNOSIS — K29.70 GASTRITIS: ICD-10-CM

## 2023-08-18 DIAGNOSIS — F10.10 ALCOHOL ABUSE: ICD-10-CM

## 2023-08-18 DIAGNOSIS — F17.200 SMOKING: ICD-10-CM

## 2023-08-18 DIAGNOSIS — N17.9 AKI (ACUTE KIDNEY INJURY) (HCC): Primary | ICD-10-CM

## 2023-08-18 DIAGNOSIS — F10.20 ALCOHOLISM (HCC): ICD-10-CM

## 2023-08-18 PROBLEM — R10.9 ABDOMINAL PAIN: Status: ACTIVE | Noted: 2023-08-18

## 2023-08-18 LAB
2HR DELTA HS TROPONIN: 61 NG/L
ALBUMIN SERPL BCP-MCNC: 5 G/DL (ref 3.5–5)
ALP SERPL-CCNC: 54 U/L (ref 34–104)
ALT SERPL W P-5'-P-CCNC: 24 U/L (ref 7–52)
ANION GAP SERPL CALCULATED.3IONS-SCNC: 12 MMOL/L
AST SERPL W P-5'-P-CCNC: 39 U/L (ref 13–39)
ATRIAL RATE: 41 BPM
BASOPHILS # BLD AUTO: 0.03 THOUSANDS/ÂΜL (ref 0–0.1)
BASOPHILS NFR BLD AUTO: 0 % (ref 0–1)
BILIRUB SERPL-MCNC: 1.08 MG/DL (ref 0.2–1)
BUN SERPL-MCNC: 40 MG/DL (ref 5–25)
CALCIUM SERPL-MCNC: 9.9 MG/DL (ref 8.4–10.2)
CARDIAC TROPONIN I PNL SERPL HS: 21 NG/L
CARDIAC TROPONIN I PNL SERPL HS: 82 NG/L
CHLORIDE SERPL-SCNC: 97 MMOL/L (ref 96–108)
CO2 SERPL-SCNC: 23 MMOL/L (ref 21–32)
CREAT SERPL-MCNC: 1.91 MG/DL (ref 0.6–1.3)
EOSINOPHIL # BLD AUTO: 0.05 THOUSAND/ÂΜL (ref 0–0.61)
EOSINOPHIL NFR BLD AUTO: 1 % (ref 0–6)
ERYTHROCYTE [DISTWIDTH] IN BLOOD BY AUTOMATED COUNT: 16 % (ref 11.6–15.1)
ETHANOL SERPL-MCNC: <10 MG/DL
GFR SERPL CREATININE-BSD FRML MDRD: 33 ML/MIN/1.73SQ M
GLUCOSE SERPL-MCNC: 124 MG/DL (ref 65–140)
GLUCOSE SERPL-MCNC: 142 MG/DL (ref 65–140)
HCT VFR BLD AUTO: 38.4 % (ref 36.5–49.3)
HGB BLD-MCNC: 13 G/DL (ref 12–17)
IMM GRANULOCYTES # BLD AUTO: 0.03 THOUSAND/UL (ref 0–0.2)
IMM GRANULOCYTES NFR BLD AUTO: 0 % (ref 0–2)
LACTATE SERPL-SCNC: 1.6 MMOL/L (ref 0.5–2)
LIPASE SERPL-CCNC: 13 U/L (ref 11–82)
LYMPHOCYTES # BLD AUTO: 1.49 THOUSANDS/ÂΜL (ref 0.6–4.47)
LYMPHOCYTES NFR BLD AUTO: 19 % (ref 14–44)
MAGNESIUM SERPL-MCNC: 1.9 MG/DL (ref 1.9–2.7)
MCH RBC QN AUTO: 31.4 PG (ref 26.8–34.3)
MCHC RBC AUTO-ENTMCNC: 33.9 G/DL (ref 31.4–37.4)
MCV RBC AUTO: 93 FL (ref 82–98)
MONOCYTES # BLD AUTO: 0.69 THOUSAND/ÂΜL (ref 0.17–1.22)
MONOCYTES NFR BLD AUTO: 9 % (ref 4–12)
NEUTROPHILS # BLD AUTO: 5.46 THOUSANDS/ÂΜL (ref 1.85–7.62)
NEUTS SEG NFR BLD AUTO: 71 % (ref 43–75)
NRBC BLD AUTO-RTO: 0 /100 WBCS
PLATELET # BLD AUTO: 186 THOUSANDS/UL (ref 149–390)
PMV BLD AUTO: 10 FL (ref 8.9–12.7)
POTASSIUM SERPL-SCNC: 4.5 MMOL/L (ref 3.5–5.3)
PROT SERPL-MCNC: 8.4 G/DL (ref 6.4–8.4)
QRS AXIS: 52 DEGREES
QRSD INTERVAL: 76 MS
QT INTERVAL: 462 MS
QTC INTERVAL: 445 MS
RBC # BLD AUTO: 4.14 MILLION/UL (ref 3.88–5.62)
SODIUM SERPL-SCNC: 132 MMOL/L (ref 135–147)
T WAVE AXIS: 58 DEGREES
VENTRICULAR RATE: 56 BPM
WBC # BLD AUTO: 7.75 THOUSAND/UL (ref 4.31–10.16)

## 2023-08-18 PROCEDURE — 74176 CT ABD & PELVIS W/O CONTRAST: CPT

## 2023-08-18 PROCEDURE — 99223 1ST HOSP IP/OBS HIGH 75: CPT | Performed by: STUDENT IN AN ORGANIZED HEALTH CARE EDUCATION/TRAINING PROGRAM

## 2023-08-18 PROCEDURE — 96361 HYDRATE IV INFUSION ADD-ON: CPT

## 2023-08-18 PROCEDURE — 85025 COMPLETE CBC W/AUTO DIFF WBC: CPT | Performed by: EMERGENCY MEDICINE

## 2023-08-18 PROCEDURE — 99285 EMERGENCY DEPT VISIT HI MDM: CPT | Performed by: EMERGENCY MEDICINE

## 2023-08-18 PROCEDURE — 83690 ASSAY OF LIPASE: CPT | Performed by: EMERGENCY MEDICINE

## 2023-08-18 PROCEDURE — 96374 THER/PROPH/DIAG INJ IV PUSH: CPT

## 2023-08-18 PROCEDURE — 93010 ELECTROCARDIOGRAM REPORT: CPT | Performed by: INTERNAL MEDICINE

## 2023-08-18 PROCEDURE — 83735 ASSAY OF MAGNESIUM: CPT | Performed by: EMERGENCY MEDICINE

## 2023-08-18 PROCEDURE — 36415 COLL VENOUS BLD VENIPUNCTURE: CPT | Performed by: EMERGENCY MEDICINE

## 2023-08-18 PROCEDURE — 84484 ASSAY OF TROPONIN QUANT: CPT | Performed by: EMERGENCY MEDICINE

## 2023-08-18 PROCEDURE — 99285 EMERGENCY DEPT VISIT HI MDM: CPT

## 2023-08-18 PROCEDURE — 80053 COMPREHEN METABOLIC PANEL: CPT | Performed by: EMERGENCY MEDICINE

## 2023-08-18 PROCEDURE — C9113 INJ PANTOPRAZOLE SODIUM, VIA: HCPCS | Performed by: EMERGENCY MEDICINE

## 2023-08-18 PROCEDURE — 83605 ASSAY OF LACTIC ACID: CPT | Performed by: EMERGENCY MEDICINE

## 2023-08-18 PROCEDURE — 93005 ELECTROCARDIOGRAM TRACING: CPT

## 2023-08-18 PROCEDURE — G1004 CDSM NDSC: HCPCS

## 2023-08-18 PROCEDURE — 82077 ASSAY SPEC XCP UR&BREATH IA: CPT | Performed by: EMERGENCY MEDICINE

## 2023-08-18 PROCEDURE — 82948 REAGENT STRIP/BLOOD GLUCOSE: CPT

## 2023-08-18 PROCEDURE — 96375 TX/PRO/DX INJ NEW DRUG ADDON: CPT

## 2023-08-18 PROCEDURE — C9113 INJ PANTOPRAZOLE SODIUM, VIA: HCPCS | Performed by: PHYSICIAN ASSISTANT

## 2023-08-18 RX ORDER — MAGNESIUM HYDROXIDE/ALUMINUM HYDROXICE/SIMETHICONE 120; 1200; 1200 MG/30ML; MG/30ML; MG/30ML
30 SUSPENSION ORAL EVERY 6 HOURS PRN
Status: DISCONTINUED | OUTPATIENT
Start: 2023-08-18 | End: 2023-08-20 | Stop reason: HOSPADM

## 2023-08-18 RX ORDER — MAGNESIUM HYDROXIDE/ALUMINUM HYDROXICE/SIMETHICONE 120; 1200; 1200 MG/30ML; MG/30ML; MG/30ML
30 SUSPENSION ORAL ONCE
Status: COMPLETED | OUTPATIENT
Start: 2023-08-18 | End: 2023-08-18

## 2023-08-18 RX ORDER — ACETAMINOPHEN 325 MG/1
650 TABLET ORAL EVERY 6 HOURS PRN
Status: DISCONTINUED | OUTPATIENT
Start: 2023-08-18 | End: 2023-08-20 | Stop reason: HOSPADM

## 2023-08-18 RX ORDER — ASPIRIN 81 MG/1
81 TABLET, CHEWABLE ORAL DAILY
Status: DISCONTINUED | OUTPATIENT
Start: 2023-08-19 | End: 2023-08-20 | Stop reason: HOSPADM

## 2023-08-18 RX ORDER — DICYCLOMINE HYDROCHLORIDE 10 MG/1
10 CAPSULE ORAL EVERY 6 HOURS PRN
Status: DISCONTINUED | OUTPATIENT
Start: 2023-08-18 | End: 2023-08-20 | Stop reason: HOSPADM

## 2023-08-18 RX ORDER — MIRTAZAPINE 15 MG/1
15 TABLET, FILM COATED ORAL
Status: DISCONTINUED | OUTPATIENT
Start: 2023-08-18 | End: 2023-08-20 | Stop reason: HOSPADM

## 2023-08-18 RX ORDER — ONDANSETRON 2 MG/ML
4 INJECTION INTRAMUSCULAR; INTRAVENOUS EVERY 6 HOURS PRN
Status: DISCONTINUED | OUTPATIENT
Start: 2023-08-18 | End: 2023-08-20 | Stop reason: HOSPADM

## 2023-08-18 RX ORDER — LANOLIN ALCOHOL/MO/W.PET/CERES
100 CREAM (GRAM) TOPICAL DAILY
Status: DISCONTINUED | OUTPATIENT
Start: 2023-08-19 | End: 2023-08-20 | Stop reason: HOSPADM

## 2023-08-18 RX ORDER — INSULIN LISPRO 100 [IU]/ML
1-5 INJECTION, SOLUTION INTRAVENOUS; SUBCUTANEOUS EVERY 6 HOURS SCHEDULED
Status: DISCONTINUED | OUTPATIENT
Start: 2023-08-18 | End: 2023-08-19

## 2023-08-18 RX ORDER — ATORVASTATIN CALCIUM 20 MG/1
20 TABLET, FILM COATED ORAL DAILY
Status: DISCONTINUED | OUTPATIENT
Start: 2023-08-19 | End: 2023-08-20 | Stop reason: HOSPADM

## 2023-08-18 RX ORDER — PREGABALIN 100 MG/1
300 CAPSULE ORAL 3 TIMES DAILY
Status: DISCONTINUED | OUTPATIENT
Start: 2023-08-18 | End: 2023-08-20 | Stop reason: HOSPADM

## 2023-08-18 RX ORDER — MORPHINE SULFATE 4 MG/ML
4 INJECTION, SOLUTION INTRAMUSCULAR; INTRAVENOUS ONCE
Status: COMPLETED | OUTPATIENT
Start: 2023-08-18 | End: 2023-08-18

## 2023-08-18 RX ORDER — ONDANSETRON 2 MG/ML
4 INJECTION INTRAMUSCULAR; INTRAVENOUS ONCE
Status: COMPLETED | OUTPATIENT
Start: 2023-08-18 | End: 2023-08-18

## 2023-08-18 RX ORDER — PANTOPRAZOLE SODIUM 40 MG/10ML
40 INJECTION, POWDER, LYOPHILIZED, FOR SOLUTION INTRAVENOUS ONCE
Status: COMPLETED | OUTPATIENT
Start: 2023-08-18 | End: 2023-08-18

## 2023-08-18 RX ORDER — NICOTINE 21 MG/24HR
1 PATCH, TRANSDERMAL 24 HOURS TRANSDERMAL DAILY
Status: DISCONTINUED | OUTPATIENT
Start: 2023-08-19 | End: 2023-08-20 | Stop reason: HOSPADM

## 2023-08-18 RX ORDER — SUCRALFATE 1 G/1
1 TABLET ORAL
Status: DISCONTINUED | OUTPATIENT
Start: 2023-08-18 | End: 2023-08-20 | Stop reason: HOSPADM

## 2023-08-18 RX ORDER — HYDRALAZINE HYDROCHLORIDE 20 MG/ML
10 INJECTION INTRAMUSCULAR; INTRAVENOUS EVERY 4 HOURS PRN
Status: DISCONTINUED | OUTPATIENT
Start: 2023-08-18 | End: 2023-08-20 | Stop reason: HOSPADM

## 2023-08-18 RX ORDER — HEPARIN SODIUM 5000 [USP'U]/ML
5000 INJECTION, SOLUTION INTRAVENOUS; SUBCUTANEOUS EVERY 8 HOURS SCHEDULED
Status: DISCONTINUED | OUTPATIENT
Start: 2023-08-18 | End: 2023-08-20 | Stop reason: HOSPADM

## 2023-08-18 RX ORDER — LISINOPRIL 20 MG/1
20 TABLET ORAL DAILY
Status: DISCONTINUED | OUTPATIENT
Start: 2023-08-19 | End: 2023-08-18

## 2023-08-18 RX ORDER — SODIUM CHLORIDE, SODIUM GLUCONATE, SODIUM ACETATE, POTASSIUM CHLORIDE, MAGNESIUM CHLORIDE, SODIUM PHOSPHATE, DIBASIC, AND POTASSIUM PHOSPHATE .53; .5; .37; .037; .03; .012; .00082 G/100ML; G/100ML; G/100ML; G/100ML; G/100ML; G/100ML; G/100ML
100 INJECTION, SOLUTION INTRAVENOUS CONTINUOUS
Status: DISCONTINUED | OUTPATIENT
Start: 2023-08-18 | End: 2023-08-20 | Stop reason: HOSPADM

## 2023-08-18 RX ORDER — CLOPIDOGREL BISULFATE 75 MG/1
75 TABLET ORAL DAILY
Status: DISCONTINUED | OUTPATIENT
Start: 2023-08-19 | End: 2023-08-20 | Stop reason: HOSPADM

## 2023-08-18 RX ORDER — LIDOCAINE HYDROCHLORIDE 20 MG/ML
15 SOLUTION OROPHARYNGEAL ONCE
Status: DISCONTINUED | OUTPATIENT
Start: 2023-08-18 | End: 2023-08-20 | Stop reason: HOSPADM

## 2023-08-18 RX ORDER — FOLIC ACID 1 MG/1
1 TABLET ORAL DAILY
Status: DISCONTINUED | OUTPATIENT
Start: 2023-08-19 | End: 2023-08-20 | Stop reason: HOSPADM

## 2023-08-18 RX ORDER — SUCRALFATE ORAL 1 G/10ML
1 SUSPENSION ORAL 4 TIMES DAILY
Status: DISCONTINUED | OUTPATIENT
Start: 2023-08-18 | End: 2023-08-18

## 2023-08-18 RX ORDER — FOLIC ACID 1 MG/1
1 TABLET ORAL DAILY
Status: DISCONTINUED | OUTPATIENT
Start: 2023-08-19 | End: 2023-08-18 | Stop reason: SDUPTHER

## 2023-08-18 RX ORDER — PANTOPRAZOLE SODIUM 40 MG/10ML
40 INJECTION, POWDER, LYOPHILIZED, FOR SOLUTION INTRAVENOUS EVERY 12 HOURS SCHEDULED
Status: DISCONTINUED | OUTPATIENT
Start: 2023-08-18 | End: 2023-08-20 | Stop reason: HOSPADM

## 2023-08-18 RX ADMIN — SUCRALFATE 1 G: 1 TABLET ORAL at 21:26

## 2023-08-18 RX ADMIN — SODIUM CHLORIDE, SODIUM GLUCONATE, SODIUM ACETATE, POTASSIUM CHLORIDE AND MAGNESIUM CHLORIDE 100 ML/HR: 526; 502; 368; 37; 30 INJECTION, SOLUTION INTRAVENOUS at 21:41

## 2023-08-18 RX ADMIN — PANTOPRAZOLE SODIUM 40 MG: 40 INJECTION, POWDER, FOR SOLUTION INTRAVENOUS at 18:50

## 2023-08-18 RX ADMIN — ALUMINUM HYDROXIDE, MAGNESIUM HYDROXIDE, AND DIMETHICONE 30 ML: 200; 20; 200 SUSPENSION ORAL at 22:13

## 2023-08-18 RX ADMIN — MORPHINE SULFATE 4 MG: 4 INJECTION INTRAVENOUS at 18:09

## 2023-08-18 RX ADMIN — ONDANSETRON 4 MG: 2 INJECTION INTRAMUSCULAR; INTRAVENOUS at 18:09

## 2023-08-18 RX ADMIN — DICYCLOMINE HYDROCHLORIDE 10 MG: 10 CAPSULE ORAL at 21:26

## 2023-08-18 RX ADMIN — PANTOPRAZOLE SODIUM 40 MG: 40 INJECTION, POWDER, FOR SOLUTION INTRAVENOUS at 21:26

## 2023-08-18 RX ADMIN — MIRTAZAPINE 15 MG: 15 TABLET, FILM COATED ORAL at 21:26

## 2023-08-18 RX ADMIN — HEPARIN SODIUM 5000 UNITS: 5000 INJECTION INTRAVENOUS; SUBCUTANEOUS at 21:26

## 2023-08-18 RX ADMIN — PREGABALIN 300 MG: 100 CAPSULE ORAL at 21:26

## 2023-08-18 RX ADMIN — SODIUM CHLORIDE 1000 ML: 0.9 INJECTION, SOLUTION INTRAVENOUS at 18:10

## 2023-08-18 NOTE — ED PROVIDER NOTES
History  Chief Complaint   Patient presents with   • Abdominal Pain     Generalized abdominal pain starting yesterday continuing today. 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 Last drink two days ago per patient. Nausea and vomiting today, denies any diarrhea. 59-year-old male presents to the emergency room with upper abdominal pain since yesterday. Patient states that he has a history of alcohol abuse. States that he last drink 2 days ago. He reports a history of pancreatitis for which she was seen here in June for and states that symptoms are similar. He states that he has had constant upper abdominal pain over the last 2 days. Has been worsening. Describes it as a sharp/stabbing pain. He reports associated nausea/vomiting and diarrhea. Denies any fever/chills, chest pain, shortness of breath, or urinary symptoms. No other complaints. History provided by:  Patient  Abdominal Pain  Pain location:  Epigastric  Pain quality: sharp and stabbing    Pain radiates to:  Does not radiate  Pain severity:  Moderate  Onset quality:  Sudden  Duration:  2 days  Timing:  Constant  Progression:  Worsening  Chronicity:  New  Context: alcohol use    Context: not previous surgeries and not sick contacts    Relieved by:  None tried  Worsened by:  Nothing  Ineffective treatments:  None tried  Associated symptoms: nausea and vomiting    Associated symptoms: no chest pain, no chills, no cough, no diarrhea, no dysuria, no fever, no hematuria, no shortness of breath and no sore throat    Risk factors: alcohol abuse        Prior to Admission Medications   Prescriptions Last Dose Informant Patient Reported? Taking?    Blood Pressure Monitor MARIELLA  Self No No   Sig: by Does not apply route daily   Lancets (FREESTYLE) lancets  Self No No   Sig: Use as instructed   aspirin 81 mg chewable tablet  Self Yes No   Sig: Chew 81 mg daily atorvastatin (LIPITOR) 20 mg tablet  Self No No   Sig: Take 1 tablet (20 mg total) by mouth daily   clopidogrel (PLAVIX) 75 mg tablet  Self No No   Sig: Take 1 tablet (75 mg total) by mouth daily   ferrous sulfate 325 (65 Fe) mg tablet  Self Yes No   Sig: Take 325 mg by mouth daily with breakfast   folic acid (FOLVITE) 1 mg tablet  Self No No   Sig: Take 1 tablet (1 mg total) by mouth daily   gabapentin (NEURONTIN) 100 mg capsule   No No   Sig: take 1 capsule by mouth three times a day   glucose blood test strip  Self No No   Sig: Use as instructed   glucose monitoring kit (FREESTYLE) monitoring kit  Self No No   Si each by Does not apply route daily   lisinopril (ZESTRIL) 20 mg tablet  Self No No   Sig: Take 1 tablet (20 mg total) by mouth daily   magnesium hydroxide (MILK OF MAGNESIA) 400 mg/5 mL oral suspension   No No   Sig: Take 30 mL by mouth daily as needed for constipation   mirtazapine (REMERON) 15 mg tablet  Self No No   Sig: Take 1 tablet (15 mg total) by mouth daily at bedtime   omega-3-acid ethyl esters (LOVAZA) 1 g capsule  Self Yes No   Sig: Take 2 capsules by mouth 2 (two) times a day   omeprazole (PriLOSEC) 20 mg delayed release capsule   No No   Sig: Take 1 capsule (20 mg total) by mouth daily   ondansetron (ZOFRAN) 4 mg tablet   No No   Sig: Take 1 tablet (4 mg total) by mouth every 6 (six) hours   ondansetron (ZOFRAN) 4 mg tablet   No No   Sig: Take 1 tablet (4 mg total) by mouth every 6 (six) hours   oxyCODONE-acetaminophen (Percocet) 5-325 mg per tablet   No No   Sig: Take 1 tablet by mouth every 6 (six) hours as needed for severe pain (dx abdominal pain/pancreatitis.   ongoing rx.) Max Daily Amount: 4 tablets   pantoprazole (PROTONIX) 40 mg tablet   No No   Sig: Take 1 tablet (40 mg total) by mouth 2 (two) times a day for 14 days   pregabalin (LYRICA) 300 MG capsule  Self Yes No   Sig: Take 300 mg by mouth 3 (three) times a day   sitaGLIPtin-metFORMIN (JANUMET)  MG per tablet  Self No No   Sig: Take 1 tablet by mouth 2 (two) times a day with meals   sucralfate (CARAFATE) 1 g/10 mL suspension  Self No No   Sig: Take 10 mL (1 g total) by mouth 4 (four) times a day   thiamine 100 MG tablet  Self No No   Sig: Take 1 tablet (100 mg total) by mouth daily      Facility-Administered Medications: None       Past Medical History:   Diagnosis Date   • Chest pain    • Chronic hepatitis C ([facility]) 3/15/2013   • Diabetes mellitus ([facility])    • Heartburn    • Hepatitis C    • Indigestion    • Liver disease    • Myocardial infarction ([facility])    • Seizures ([facility])        History reviewed. No pertinent surgical history. Family History   Problem Relation Age of Onset   • Heart attack Mother    • Diabetes Mother    • Colon cancer Father    • Diabetes Father    • Alcohol abuse Neg Hx    • Substance Abuse Neg Hx    • Mental illness Neg Hx    • Depression Neg Hx      I have reviewed and agree with the history as documented. E-Cigarette/Vaping   • E-Cigarette Use Never User      E-Cigarette/Vaping Substances   • Nicotine No    • THC No    • CBD No    • Flavoring No    • Other No    • Unknown No      Social History     Tobacco Use   • Smoking status: Every Day     Packs/day: 0.50     Types: Cigarettes   • Smokeless tobacco: Never   Vaping Use   • Vaping Use: Never used   Substance Use Topics   • Alcohol use: Yes     Alcohol/week: 21.0 standard drinks of alcohol     Types: 21 Cans of beer per week   • Drug use: Not Currently     Types: Marijuana       Review of Systems   Constitutional: Negative for chills and fever. HENT: Negative for congestion, ear pain and sore throat. Eyes: Negative for pain and visual disturbance. Respiratory: Negative for cough, shortness of breath and wheezing. Cardiovascular: Negative for chest pain and leg swelling. Gastrointestinal: Positive for abdominal pain, nausea and vomiting. Negative for diarrhea. Genitourinary: Negative for dysuria, frequency, hematuria and urgency. Musculoskeletal: Negative for neck pain and neck stiffness. Skin: Negative for rash and wound. Neurological: Negative for weakness, numbness and headaches. Psychiatric/Behavioral: Negative for agitation and confusion. All other systems reviewed and are negative. Physical Exam  Physical Exam  Vitals and nursing note reviewed. Constitutional:       Appearance: He is well-developed. HENT:      Head: Normocephalic and atraumatic. Eyes:      Pupils: Pupils are equal, round, and reactive to light. Cardiovascular:      Rate and Rhythm: Normal rate and regular rhythm. Pulmonary:      Effort: Pulmonary effort is normal.      Breath sounds: Normal breath sounds. Abdominal:      General: Bowel sounds are normal.      Palpations: Abdomen is soft. Tenderness: There is abdominal tenderness in the epigastric area. Musculoskeletal:         General: Normal range of motion. Cervical back: Normal range of motion and neck supple. Skin:     General: Skin is warm and dry. Neurological:      General: No focal deficit present. Mental Status: He is alert and oriented to person, place, and time.       Comments: No focal deficits         Vital Signs  ED Triage Vitals   Temperature Pulse Respirations Blood Pressure SpO2   08/18/23 1609 08/18/23 1609 08/18/23 1609 08/18/23 1609 08/18/23 1609   98.2 °F (36.8 °C) 62 16 (!) 188/79 100 %      Temp Source Heart Rate Source Patient Position - Orthostatic VS BP Location FiO2 (%)   08/18/23 1609 08/18/23 1609 08/18/23 1609 08/18/23 1609 --   Oral Monitor Sitting Left arm       Pain Score       08/18/23 1809       10 - Worst Possible Pain           Vitals:    08/18/23 1609 08/18/23 1853   BP: (!) 188/79 (!) 176/86   Pulse: 62 64   Patient Position - Orthostatic VS: Sitting Lying         Visual Acuity      ED Medications  Medications   sodium chloride 0.9 % bolus 1,000 mL (1,000 mL Intravenous New Bag 8/18/23 1810)   morphine injection 4 mg (4 mg Intravenous Given 8/18/23 1809)   ondansetron (ZOFRAN) injection 4 mg (4 mg Intravenous Given 8/18/23 1809)   pantoprazole (PROTONIX) injection 40 mg (40 mg Intravenous Given 8/18/23 1850)       Diagnostic Studies  Results Reviewed     Procedure Component Value Units Date/Time    Magnesium [[id number]]  (Normal) Collected: 08/18/23 1811    Lab Status: Final result Specimen: Blood from Arm, Left Updated: 08/18/23 1944     Magnesium 1.9 mg/dL     Ethanol [[id number]]     Lab Status: No result Specimen: Blood     HS Troponin 0hr (reflex protocol) [[id number]]  (Normal) Collected: 08/18/23 1811    Lab Status: Final result Specimen: Blood from Arm, Left Updated: 08/18/23 1846     hs TnI 0hr 21 ng/L     HS Troponin I 2hr [[id number]]     Lab Status: No result Specimen: Blood     Lactic acid, plasma (w/reflex if result > 2.0) [[id number]]  (Normal) Collected: 08/18/23 1811    Lab Status: Final result Specimen: Blood from Arm, Left Updated: 08/18/23 1841     LACTIC ACID 1.6 mmol/L     Narrative:      Result may be elevated if tourniquet was used during collection.     Comprehensive metabolic panel [[id number]]  (Abnormal) Collected: 08/18/23 1648    Lab Status: Final result Specimen: Blood Updated: 08/18/23 1652     Sodium 132 mmol/L      Potassium 4.5 mmol/L      Chloride 97 mmol/L      CO2 23 mmol/L      ANION GAP 12 mmol/L      BUN 40 mg/dL      Creatinine 1.91 mg/dL      Glucose 142 mg/dL      Calcium 9.9 mg/dL      AST 39 U/L      ALT 24 U/L      Alkaline Phosphatase 54 U/L      Total Protein 8.4 g/dL      Albumin 5.0 g/dL      Total Bilirubin 1.08 mg/dL      eGFR 33 ml/min/1.73sq m     Narrative:      [place] guidelines for Chronic Kidney Disease (CKD):   •  Stage 1 with normal or high GFR (GFR > 90 mL/min/1.73 square meters)  •  Stage 2 Mild CKD (GFR = 60-89 mL/min/1.73 square meters)  •  Stage 3A Moderate CKD (GFR = 45-59 mL/min/1.73 square meters)  •  Stage 3B Moderate CKD (GFR = 30-44 mL/min/1.73 square meters)  •  Stage 4 Severe CKD (GFR = 15-29 mL/min/1.73 square meters)  •  Stage 5 End Stage CKD (GFR <15 mL/min/1.73 square meters)  Note: GFR calculation is accurate only with a steady state creatinine    Lipase [[id number]]  (Normal) Collected: 08/18/23 1648    Lab Status: Final result Specimen: Blood Updated: 08/18/23 1652     Lipase 13 u/L     CBC and differential [[id number]]  (Abnormal) Collected: 08/18/23 1621    Lab Status: Final result Specimen: Blood Updated: 08/18/23 1626     WBC 7.75 Thousand/uL      RBC 4.14 Million/uL      Hemoglobin 13.0 g/dL      Hematocrit 38.4 %      MCV 93 fL      MCH 31.4 pg      MCHC 33.9 g/dL      RDW 16.0 %      MPV 10.0 fL      Platelets 294 Thousands/uL      nRBC 0 /100 WBCs      Neutrophils Relative 71 %      Immat GRANS % 0 %      Lymphocytes Relative 19 %      Monocytes Relative 9 %      Eosinophils Relative 1 %      Basophils Relative 0 %      Neutrophils Absolute 5.46 Thousands/µL      Immature Grans Absolute 0.03 Thousand/uL      Lymphocytes Absolute 1.49 Thousands/µL      Monocytes Absolute 0.69 Thousand/µL      Eosinophils Absolute 0.05 Thousand/µL      Basophils Absolute 0.03 Thousands/µL                  CT abdomen pelvis wo contrast   Final Result by Yves Boss MD (08/18 1808)      Similar mild thickening of the distal stomach and proximal duodenum compared to prior studies, again nonspecific but can be seen with gastroenteritis or peptic ulcer disease. No bowel obstruction. Otherwise no acute findings in the abdomen or pelvis to explain the patient's upper abdominal pain. Workstation performed: [id number]                    Procedures  Procedures         ED Course  ED Course as of 08/18/23 1949   Fri Aug 18, 2023   1658 Yesterday with upper abd pain worse today. 2 days ago - last drink. Hx of pancreatitis - was seen here in June for same. Nausea and vomiting. Constant but worse at times. Sharp. Diarrhea. Medical Decision Making  66-year-old male with abdominal pain, nausea/vomiting/diarrhea. Will get labs, lipase,and ct abd/pel. Will give fluids, pain meds, and antiemetics. Patient with KADE- will admit for hydration     KADE (acute kidney injury) [facility]): acute illness or injury  Alcohol abuse: acute illness or injury  Gastritis: acute illness or injury  Amount and/or Complexity of Data Reviewed  Labs: ordered. Radiology: ordered. Risk  Prescription drug management. Decision regarding hospitalization. Disposition  Final diagnoses:   KADE (acute kidney injury) ([address])   Gastritis   Alcohol abuse     Time reflects when diagnosis was documented in both MDM as applicable and the Disposition within this note     Time User Action Codes Description Comment    8/18/2023  7:49 PM Rex OH Add [N17.9] KADE (acute kidney injury) ([address])     8/18/2023  7:49 PM Rex OH Add [K29.70] Gastritis     8/18/2023  7:49 PM Rex OH Add [F10.10] Alcohol abuse       ED Disposition     ED Disposition   Admit    Condition   Stable    Date/Time   Fri Aug 18, 2023  7:49 PM    Comment   Case was discussed with ROBBIE and the patient's admission status was agreed to be Admission Status: inpatient status to the service of Dr. Bea Suazo . Follow-up Information    None         Patient's Medications   Discharge Prescriptions    No medications on file       No discharge procedures on file.     PDMP Review     None          ED Provider  Electronically Signed by           Rex Benitez DO  08/18/23 1946

## 2023-08-19 ENCOUNTER — ANESTHESIA EVENT (INPATIENT)
Dept: GASTROENTEROLOGY | Facility: HOSPITAL | Age: 78
End: 2023-08-19
Payer: COMMERCIAL

## 2023-08-19 ENCOUNTER — ANESTHESIA (INPATIENT)
Dept: GASTROENTEROLOGY | Facility: HOSPITAL | Age: 78
End: 2023-08-19
Payer: COMMERCIAL

## 2023-08-19 ENCOUNTER — APPOINTMENT (INPATIENT)
Dept: GASTROENTEROLOGY | Facility: HOSPITAL | Age: 78
End: 2023-08-19
Attending: INTERNAL MEDICINE
Payer: COMMERCIAL

## 2023-08-19 PROBLEM — F17.200 SMOKING: Status: ACTIVE | Noted: 2023-08-19

## 2023-08-19 LAB
4HR DELTA HS TROPONIN: 28 NG/L
ALBUMIN SERPL BCP-MCNC: 4.3 G/DL (ref 3.5–5)
ALP SERPL-CCNC: 47 U/L (ref 34–104)
ALT SERPL W P-5'-P-CCNC: 19 U/L (ref 7–52)
ANION GAP SERPL CALCULATED.3IONS-SCNC: 14 MMOL/L
AST SERPL W P-5'-P-CCNC: 29 U/L (ref 13–39)
BILIRUB SERPL-MCNC: 0.94 MG/DL (ref 0.2–1)
BUN SERPL-MCNC: 28 MG/DL (ref 5–25)
CALCIUM SERPL-MCNC: 9.2 MG/DL (ref 8.4–10.2)
CARDIAC TROPONIN I PNL SERPL HS: 49 NG/L
CHLORIDE SERPL-SCNC: 96 MMOL/L (ref 96–108)
CO2 SERPL-SCNC: 22 MMOL/L (ref 21–32)
CREAT SERPL-MCNC: 1.47 MG/DL (ref 0.6–1.3)
ERYTHROCYTE [DISTWIDTH] IN BLOOD BY AUTOMATED COUNT: 16.1 % (ref 11.6–15.1)
GFR SERPL CREATININE-BSD FRML MDRD: 45 ML/MIN/1.73SQ M
GLUCOSE SERPL-MCNC: 111 MG/DL (ref 65–140)
GLUCOSE SERPL-MCNC: 118 MG/DL (ref 65–140)
GLUCOSE SERPL-MCNC: 153 MG/DL (ref 65–140)
GLUCOSE SERPL-MCNC: 323 MG/DL (ref 65–140)
HCT VFR BLD AUTO: 36 % (ref 36.5–49.3)
HGB BLD-MCNC: 12.4 G/DL (ref 12–17)
MCH RBC QN AUTO: 32 PG (ref 26.8–34.3)
MCHC RBC AUTO-ENTMCNC: 34.4 G/DL (ref 31.4–37.4)
MCV RBC AUTO: 93 FL (ref 82–98)
PLATELET # BLD AUTO: 158 THOUSANDS/UL (ref 149–390)
PMV BLD AUTO: 9.6 FL (ref 8.9–12.7)
POTASSIUM SERPL-SCNC: 4.5 MMOL/L (ref 3.5–5.3)
PROT SERPL-MCNC: 7 G/DL (ref 6.4–8.4)
RBC # BLD AUTO: 3.87 MILLION/UL (ref 3.88–5.62)
SODIUM SERPL-SCNC: 132 MMOL/L (ref 135–147)
WBC # BLD AUTO: 10.3 THOUSAND/UL (ref 4.31–10.16)

## 2023-08-19 PROCEDURE — 88342 IMHCHEM/IMCYTCHM 1ST ANTB: CPT | Performed by: STUDENT IN AN ORGANIZED HEALTH CARE EDUCATION/TRAINING PROGRAM

## 2023-08-19 PROCEDURE — 94762 N-INVAS EAR/PLS OXIMTRY CONT: CPT

## 2023-08-19 PROCEDURE — 88313 SPECIAL STAINS GROUP 2: CPT | Performed by: STUDENT IN AN ORGANIZED HEALTH CARE EDUCATION/TRAINING PROGRAM

## 2023-08-19 PROCEDURE — 85027 COMPLETE CBC AUTOMATED: CPT | Performed by: STUDENT IN AN ORGANIZED HEALTH CARE EDUCATION/TRAINING PROGRAM

## 2023-08-19 PROCEDURE — 80053 COMPREHEN METABOLIC PANEL: CPT | Performed by: STUDENT IN AN ORGANIZED HEALTH CARE EDUCATION/TRAINING PROGRAM

## 2023-08-19 PROCEDURE — 99231 SBSQ HOSP IP/OBS SF/LOW 25: CPT | Performed by: INTERNAL MEDICINE

## 2023-08-19 PROCEDURE — 88305 TISSUE EXAM BY PATHOLOGIST: CPT | Performed by: STUDENT IN AN ORGANIZED HEALTH CARE EDUCATION/TRAINING PROGRAM

## 2023-08-19 PROCEDURE — C9113 INJ PANTOPRAZOLE SODIUM, VIA: HCPCS | Performed by: PHYSICIAN ASSISTANT

## 2023-08-19 PROCEDURE — 88341 IMHCHEM/IMCYTCHM EA ADD ANTB: CPT | Performed by: STUDENT IN AN ORGANIZED HEALTH CARE EDUCATION/TRAINING PROGRAM

## 2023-08-19 PROCEDURE — 84484 ASSAY OF TROPONIN QUANT: CPT | Performed by: EMERGENCY MEDICINE

## 2023-08-19 PROCEDURE — 0DB78ZX EXCISION OF STOMACH, PYLORUS, VIA NATURAL OR ARTIFICIAL OPENING ENDOSCOPIC, DIAGNOSTIC: ICD-10-PCS | Performed by: INTERNAL MEDICINE

## 2023-08-19 PROCEDURE — 82948 REAGENT STRIP/BLOOD GLUCOSE: CPT

## 2023-08-19 PROCEDURE — 99222 1ST HOSP IP/OBS MODERATE 55: CPT | Performed by: INTERNAL MEDICINE

## 2023-08-19 PROCEDURE — 43239 EGD BIOPSY SINGLE/MULTIPLE: CPT | Performed by: INTERNAL MEDICINE

## 2023-08-19 PROCEDURE — C9113 INJ PANTOPRAZOLE SODIUM, VIA: HCPCS | Performed by: INTERNAL MEDICINE

## 2023-08-19 RX ORDER — INSULIN LISPRO 100 [IU]/ML
1-5 INJECTION, SOLUTION INTRAVENOUS; SUBCUTANEOUS
Status: DISCONTINUED | OUTPATIENT
Start: 2023-08-19 | End: 2023-08-20 | Stop reason: HOSPADM

## 2023-08-19 RX ORDER — SODIUM CHLORIDE, SODIUM LACTATE, POTASSIUM CHLORIDE, CALCIUM CHLORIDE 600; 310; 30; 20 MG/100ML; MG/100ML; MG/100ML; MG/100ML
INJECTION, SOLUTION INTRAVENOUS CONTINUOUS PRN
Status: DISCONTINUED | OUTPATIENT
Start: 2023-08-19 | End: 2023-08-19

## 2023-08-19 RX ORDER — LIDOCAINE HYDROCHLORIDE 20 MG/ML
INJECTION, SOLUTION EPIDURAL; INFILTRATION; INTRACAUDAL; PERINEURAL AS NEEDED
Status: DISCONTINUED | OUTPATIENT
Start: 2023-08-19 | End: 2023-08-19

## 2023-08-19 RX ORDER — INSULIN LISPRO 100 [IU]/ML
1-5 INJECTION, SOLUTION INTRAVENOUS; SUBCUTANEOUS
Status: DISCONTINUED | OUTPATIENT
Start: 2023-08-20 | End: 2023-08-20 | Stop reason: HOSPADM

## 2023-08-19 RX ORDER — PROPOFOL 10 MG/ML
INJECTION, EMULSION INTRAVENOUS AS NEEDED
Status: DISCONTINUED | OUTPATIENT
Start: 2023-08-19 | End: 2023-08-19

## 2023-08-19 RX ADMIN — HEPARIN SODIUM 5000 UNITS: 5000 INJECTION INTRAVENOUS; SUBCUTANEOUS at 05:02

## 2023-08-19 RX ADMIN — THIAMINE HCL TAB 100 MG 100 MG: 100 TAB at 08:18

## 2023-08-19 RX ADMIN — SUCRALFATE 1 G: 1 TABLET ORAL at 15:41

## 2023-08-19 RX ADMIN — PROPOFOL 70 MG: 10 INJECTION, EMULSION INTRAVENOUS at 12:39

## 2023-08-19 RX ADMIN — PREGABALIN 300 MG: 100 CAPSULE ORAL at 08:18

## 2023-08-19 RX ADMIN — PREGABALIN 300 MG: 100 CAPSULE ORAL at 20:20

## 2023-08-19 RX ADMIN — HEPARIN SODIUM 5000 UNITS: 5000 INJECTION INTRAVENOUS; SUBCUTANEOUS at 15:41

## 2023-08-19 RX ADMIN — PANTOPRAZOLE SODIUM 40 MG: 40 INJECTION, POWDER, FOR SOLUTION INTRAVENOUS at 20:19

## 2023-08-19 RX ADMIN — SUCRALFATE 1 G: 1 TABLET ORAL at 08:48

## 2023-08-19 RX ADMIN — INSULIN LISPRO 3 UNITS: 100 INJECTION, SOLUTION INTRAVENOUS; SUBCUTANEOUS at 22:40

## 2023-08-19 RX ADMIN — SODIUM CHLORIDE, SODIUM GLUCONATE, SODIUM ACETATE, POTASSIUM CHLORIDE AND MAGNESIUM CHLORIDE 100 ML/HR: 526; 502; 368; 37; 30 INJECTION, SOLUTION INTRAVENOUS at 15:42

## 2023-08-19 RX ADMIN — NICOTINE 1 PATCH: 14 PATCH, EXTENDED RELEASE TRANSDERMAL at 08:17

## 2023-08-19 RX ADMIN — PANTOPRAZOLE SODIUM 40 MG: 40 INJECTION, POWDER, FOR SOLUTION INTRAVENOUS at 08:18

## 2023-08-19 RX ADMIN — SODIUM CHLORIDE, SODIUM LACTATE, POTASSIUM CHLORIDE, AND CALCIUM CHLORIDE: .6; .31; .03; .02 INJECTION, SOLUTION INTRAVENOUS at 12:35

## 2023-08-19 RX ADMIN — Medication 1 TABLET: at 08:18

## 2023-08-19 RX ADMIN — FOLIC ACID 1 MG: 1 TABLET ORAL at 08:18

## 2023-08-19 RX ADMIN — SUCRALFATE 1 G: 1 TABLET ORAL at 22:39

## 2023-08-19 RX ADMIN — PREGABALIN 300 MG: 100 CAPSULE ORAL at 15:41

## 2023-08-19 RX ADMIN — MIRTAZAPINE 15 MG: 15 TABLET, FILM COATED ORAL at 22:39

## 2023-08-19 RX ADMIN — HEPARIN SODIUM 5000 UNITS: 5000 INJECTION INTRAVENOUS; SUBCUTANEOUS at 22:39

## 2023-08-19 RX ADMIN — LIDOCAINE HYDROCHLORIDE 100 MG: 20 INJECTION, SOLUTION EPIDURAL; INFILTRATION; INTRACAUDAL at 12:39

## 2023-08-19 RX ADMIN — PROPOFOL 30 MG: 10 INJECTION, EMULSION INTRAVENOUS at 12:42

## 2023-08-19 RX ADMIN — ATORVASTATIN CALCIUM 20 MG: 20 TABLET, FILM COATED ORAL at 08:18

## 2023-08-19 NOTE — UTILIZATION REVIEW
Initial Clinical Review    Admission: Date/Time/Statement:   Admission Orders (From admission, onward)     Ordered        08/18/23 1948  Inpatient Admission  Once                      Orders Placed This Encounter   Procedures   • Inpatient Admission     Standing Status:   Standing     Number of Occurrences:   1     Order Specific Question:   Level of Care     Answer:   Med Surg [16]     Order Specific Question:   Estimated length of stay     Answer:   More than 2 Midnights     Order Specific Question:   Certification     Answer:   I certify that inpatient services are medically necessary for this patient for a duration of greater than two midnights. See H&P and MD Progress Notes for additional information about the patient's course of treatment. ED Arrival Information     Expected   -    Arrival   8/18/2023 15:58    Acuity   Urgent            Means of arrival   Walk-In    Escorted by   Family Member    Service   Hospitalist    Admission type   Emergency            Arrival complaint   ABD PAIN           Chief Complaint   Patient presents with   • Abdominal Pain     Generalized abdominal pain starting yesterday continuing today. 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 Last drink two days ago per patient. Nausea and vomiting today, denies any diarrhea. Initial Presentation: 68 y.o. male with PMH of T2DM, HTN, HLD, pancreatitis, alcohol abuse and tobacco use presented to the ED from home for evaluation of upper abdominal pain, nausea and vomiting. Patient drinks up to one handle of liquor daily, states last drink was two days ago. CT imaging showing gastritis versus peptic ulcer disease. PE: Alert. Epigastric tenderness. 8/18 Inpatient admission for evaluation and treatment of abdominal pain, KADE (baseline creatinine 1.0), alcoholism: Clear liquid diet, IV PPI BID, carafate, consult Gastroenterology. IVF, hold lisinopril. JESSICA, MVI.     8/19 Gastroenterology consult:  Gastritis. Protonix 40 mg IV every 12 hours. EGD today to investigate to rule out peptic ulcer disease and malignancy. Alcohol cessation. High-protein low-salt diet. Treatment of alcohol withdrawal per Internal Medicine. Multivitamin thiamine and folate. Internal Medicine:  No current evidence of withdrawal, CIWA protocol, folic acid and thiamine. KADE, treat with IVF, hold lisinopril. Continues to report mild epigastric pain that is improving, pending EGD today. 8/20  Day 3: Has surpassed a 2nd midnight with active treatments and services, which include CIWA monitoring, IV fluids.         ED Triage Vitals   Temperature Pulse Respirations Blood Pressure SpO2   08/18/23 1609 08/18/23 1609 08/18/23 1609 08/18/23 1609 08/18/23 1609   98.2 °F (36.8 °C) 62 16 (!) 188/79 100 %      Temp Source Heart Rate Source Patient Position - Orthostatic VS BP Location FiO2 (%)   08/18/23 1609 08/18/23 1609 08/18/23 1609 08/18/23 1609 --   Oral Monitor Sitting Left arm       Pain Score       08/18/23 1809       10 - Worst Possible Pain          Wt Readings from Last 1 Encounters:   08/20/23 64 kg (141 lb 1.5 oz)     Additional Vital Signs:       Date/Time Temp Pulse Resp BP MAP (mmHg) SpO2 O2 Device   08/20/23 11:29:34 98 °F (36.7 °C) -- -- 155/64 94 -- --   08/20/23 1100 -- 96 -- 155/64 -- -- --   08/20/23 08:03:47 98.3 °F (36.8 °C) -- -- 132/63 86 -- --   08/20/23 08:03:23 98.3 °F (36.8 °C) -- -- 132/63 86 -- --   08/20/23 0700 -- 98 -- 154/82 -- -- --   08/20/23 0345 -- -- -- -- -- 92 % None (Room air)   08/20/23 0300 -- 100 18 154/67 -- 92 % None (Room air)   08/20/23 0040 -- -- -- -- -- 91 % None (Room air)   08/19/23 2300 99.8 °F (37.7 °C) 97 18 149/70 96 95 % None (Room air)   08/19/23 1951 -- -- -- -- -- 92 % None (Room air)   08/19/23 1900 97.9 °F (36.6 °C) 96 18 159/69 99 95 % None (Room air)     Date/Time Temp Pulse Resp BP MAP (mmHg) SpO2 O2 Device   08/19/23 1600 97.8 °F (36.6 °C) 94 16 149/69 96 94 % None (Room air)   08/19/23 1500 99 °F (37.2 °C) 92 18 156/79 105 95 % None (Room air)   08/19/23 13:12:26 -- 83 -- 149/65 93 -- --   08/19/23 1301 -- 83 18 122/67 -- 94 % None (Room air)   08/19/23 1257 -- 85 17 120/64 -- 95 % None (Room air)   08/19/23 1247 97.8 °F (36.6 °C) 98 14 104/61 -- 95 % None (Room air)   08/19/23 1110 99 °F (37.2 °C) 73 18 151/69 -- 97 % None (Room air)   08/19/23 0920 -- -- -- 152/62 92 -- --   08/19/23 0918 -- 72 -- 152/62 -- -- --   08/19/23 0700 98.3 °F (36.8 °C) 72 18 154/62 93 98 % --   08/19/23 0542 -- -- -- -- 98 -- --   08/19/23 0538 -- 83 -- 165/64 -- -- --   08/19/23 0318 -- -- -- -- -- 97 % --   08/19/23 0230 -- 60 -- 157/70 -- -- --   08/19/23 0114 -- -- -- -- -- 95 % None (Room air)   08/18/23 22:09:46 98.4 °F (36.9 °C) -- 18 142/74 97 -- --   08/18/23 20:32:56 97.9 °F (36.6 °C) -- 18 163/75 104 -- --   08/18/23 1853 -- 64 16 176/86 Abnormal  -- 95 % None (Room air)       CIWA-Ar Score    Row Name 08/20/23 11:29:51 08/20/23 11:29:34 08/20/23 1100 08/20/23 08:03:47 08/20/23 08:03:23   CIWA-Ar   BP --  -/64  -/64  -/63  -/63  -DI   Pulse -- -- 96  -WC -- --   Nausea and Vomiting -- -- 0  -WC -- --   Tactile Disturbances -- -- 0  -WC -- --   Tremor -- -- 0  -WC -- --   Auditory Disturbances -- -- 0  -WC -- --   Paroxysmal Sweats -- -- 0  -WC -- --   Visual Disturbances -- -- 0  -WC -- --   Anxiety -- -- 0  -WC -- --   Headache, Fullness in Head -- -- 0  -WC -- --   Agitation -- -- 0  -WC -- --   Orientation and Clouding of Sensorium -- -- 0  -WC -- --   CIWA-Ar Total -- -- 0  -WC -- --   Row Name 08/20/23 0700 08/20/23 0300 08/19/23 2300 08/19/23 2200 08/19/23 1900   CIWA-Ar   /82  -/67  -/70  -DT --  -/69  -ML   Pulse 98  -  -DT 97  -DT --  -TP 96  -ML   Nausea and Vomiting 0  -WC 0  -DT 0  -DT -- 0  -DT   Tactile Disturbances 0  -WC 0  -DT 0  -DT -- 0  -DT   Tremor 0  -WC 0  -DT 0  -DT -- 0  -DT   Auditory Disturbances 0  -WC 0  -DT 0  -DT -- 0  -DT   Paroxysmal Sweats 0  -WC 0  -DT 0  -DT -- 0  -DT   Visual Disturbances 0  -WC 0  -DT 0  -DT -- 0  -DT   Anxiety 0  -WC 0  -DT 0  -DT -- 0  -DT   Headache, Fullness in Head 0  -WC 0  -DT 0  -DT -- 0  -DT   Agitation 0  -WC 0  -DT 0  -DT -- 0  -DT   Orientation and Clouding of Sensorium 0  -WC 0  -DT 0  -DT -- 0  -DT   CIWA-Ar Total 0  -WC 0  -DT 0  -DT -- 0  -DT         Row Name 08/19/23 1600 08/19/23 1500 08/19/23 13:12:26 08/19/23 1301 08/19/23 1257   CIWA-Ar   /69  -/79  -/65  -/67  -GILBERTO 120/64  -GILBERTO   Pulse 94  -ML 92  -ML 83  -KS 83  -GILBERTO 85  -GILBERTO   Nausea and Vomiting -- -- 0  -KS -- --   Tactile Disturbances -- -- 0  -KS -- --   Tremor -- -- 0  -KS -- --   Auditory Disturbances -- -- 0  -KS -- --   Paroxysmal Sweats -- -- 0  -KS -- --   Visual Disturbances -- -- 0  -KS -- --   Anxiety -- -- 0  -KS -- --   Headache, Fullness in Head -- -- 0  -KS -- --   Agitation -- -- 0  -KS -- --   Orientation and Clouding of Sensorium -- -- 0  -KS -- --   CIWA-Ar Total -- -- 0  -KS -- --   Row Name 08/19/23 1247 08/19/23 1110 08/19/23 0920 08/19/23 0918 08/19/23 07:00:33   CIWA-Ar   /61  -GILBERTO 151/69  -/62  -/62  -KS --  -MM   Pulse 98  -GILBERTO 73  -DF -- 72  -KS --   Nausea and Vomiting -- -- -- 0  -KS --   Tactile Disturbances -- -- -- 0  -KS --   Tremor -- -- -- 1  -KS --   Auditory Disturbances -- -- -- 0  -KS --   Paroxysmal Sweats -- -- -- 0  -KS --   Visual Disturbances -- -- -- 0  -KS --   Anxiety -- -- -- 0  -KS --   Headache, Fullness in Head -- -- -- 0  -KS --   Agitation -- -- -- 0  -KS --   Orientation and Clouding of Sensorium -- -- -- 0  -KS --   CIWA-Ar Total -- -- -- 1  -KS --   Row Name 08/19/23 0700 08/19/23 0542 08/19/23 0538 08/19/23 0230 08/18/23 22:09:46   CIWA-Ar   /62  -MM --  -/64  -/70  -/74  -DI   Pulse 72  -MM -- 83  -BM 60  -BM --   Nausea and Vomiting -- -- 1  -BM 0  -BM 0  -BM Tactile Disturbances -- -- 0  -BM 0  -BM 0  -BM   Tremor -- -- 1  -BM 2  -BM 0  -BM   Auditory Disturbances -- -- 0  -BM 0  -BM 0  -BM   Paroxysmal Sweats -- -- 0  -BM 0  -BM 0  -BM   Visual Disturbances -- -- 0  -BM 0  -BM 0  -BM   Anxiety -- -- 1  -BM 1  -BM 0  -BM   Headache, Fullness in Head -- -- 0  -BM 0  -BM 0  -BM   Agitation -- -- 0  -BM 0  -BM 0  -BM   Orientation and Clouding of Sensorium -- -- 0  -BM 0  -BM 0  -BM   CIWA-Ar Total -- -- 3  -BM 3  -BM 0  -BM   Row Name 08/18/23 2110 08/18/23 20:32:56 08/18/23 1853 08/18/23 1609    CIWA-Ar   BP -- 163/75  -/86 Abnormal   -/79 Abnormal   -KM    Pulse -- -- 64  -BK 62  -KM    Nausea and Vomiting 0  -BM -- -- --    Tactile Disturbances 0  -BM -- -- --    Tremor 0  -BM -- -- --    Auditory Disturbances 0  -BM -- -- --    Paroxysmal Sweats 0  -BM -- -- --    Visual Disturbances 0  -BM -- -- --    Anxiety 0  -BM -- -- --    Headache, Fullness in Head 0  -BM -- -- --    Agitation 0  -BM -- -- --    Orientation and Clouding of Sensorium 0  -BM -- -- --    CIWA-Ar Total 0  -BM -- -- --            Pertinent Labs/Diagnostic Test Results:       8/19 EGD:    IMPRESSION:  LA grade a reflux esophagitis  1 cm hiatal hernia  Moderate erosive gastritis        RECOMMENDATION:  Protonix 40 mg IV every 12 hours; he can transiently be transition to pantoprazole 40 mg daily as an outpatient for 8 to 12 weeks  Diet as tolerated  Alcohol cessation  Await pathology       CT abdomen pelvis wo contrast   Final Result by Lorelei Jerry MD (08/18 1808)      Similar mild thickening of the distal stomach and proximal duodenum compared to prior studies, again nonspecific but can be seen with gastroenteritis or peptic ulcer disease. No bowel obstruction. Otherwise no acute findings in the abdomen or pelvis to explain the patient's upper abdominal pain.       Workstation performed: [id number]               Results from last 7 days   Lab Units 08/19/23  0455 08/18/23  1621   WBC Thousand/uL 10.30* 7.75   HEMOGLOBIN g/dL 12.4 13.0   HEMATOCRIT % 36.0* 38.4   PLATELETS Thousands/uL 158 186   NEUTROS ABS Thousands/µL  --  5.46         Results from last 7 days   Lab Units 08/19/23  0457 08/18/23  1811 08/18/23  1648   SODIUM mmol/L 132*  --  132*   POTASSIUM mmol/L 4.5  --  4.5   CHLORIDE mmol/L 96  --  97   CO2 mmol/L 22  --  23   ANION GAP mmol/L 14  --  12   BUN mg/dL 28*  --  40*   CREATININE mg/dL 1.47*  --  1.91*   EGFR ml/min/1.73sq m 45  --  33   CALCIUM mg/dL 9.2  --  9.9   MAGNESIUM mg/dL  --  1.9  --      Results from last 7 days   Lab Units 08/19/23  0457 08/18/23  1648   AST U/L 29 39   ALT U/L 19 24   ALK PHOS U/L 47 54   TOTAL PROTEIN g/dL 7.0 8.4   ALBUMIN g/dL 4.3 5.0   TOTAL BILIRUBIN mg/dL 0.94 1.08*     Results from last 7 days   Lab Units 08/20/23  1131 08/20/23  0805 08/19/23  2056 08/19/23  1607 08/19/23  0106 08/18/23 2058   POC GLUCOSE mg/dl 365* 369* 323* 153* 111 124     Results from last 7 days   Lab Units 08/19/23  0457 08/18/23  1648   GLUCOSE RANDOM mg/dL 118 142*       Results from last 7 days   Lab Units 08/19/23  0013 08/18/23 2137 08/18/23  1811   HS TNI 0HR ng/L  --   --  21   HS TNI 2HR ng/L  --  82*  --    HSTNI D2 ng/L  --  61*  --    HS TNI 4HR ng/L 49  --   --    HSTNI D4 ng/L 28*  --   --              Results from last 7 days   Lab Units 08/18/23  1811   LACTIC ACID mmol/L 1.6             Results from last 7 days   Lab Units 08/18/23  1648   LIPASE u/L 13                 Results from last 7 days   Lab Units 08/18/23  2137   ETHANOL LVL mg/dL <10           ED Treatment:   Medication Administration from 08/18/2023 1558 to 08/18/2023 2023       Date/Time Order Dose Route Action     08/18/2023 1810 EDT sodium chloride 0.9 % bolus 1,000 mL 1,000 mL Intravenous New Bag     08/18/2023 1809 EDT morphine injection 4 mg 4 mg Intravenous Given     08/18/2023 1809 EDT ondansetron (ZOFRAN) injection 4 mg 4 mg Intravenous Given     08/18/2023 1850 EDT pantoprazole (PROTONIX) injection 40 mg 40 mg Intravenous Given        Past Medical History:   Diagnosis Date   • Chest pain    • Chronic hepatitis C ([facility]) 3/15/2013   • Diabetes mellitus ([facility])    • Heartburn    • Hepatitis C    • Indigestion    • Liver disease    • Myocardial infarction ([address])    • Seizures ([facility])      Present on Admission:  • Alcoholism ([facility])  • Essential hypertension  • Hyperlipidemia  • Type 2 diabetes mellitus with diabetic polyneuropathy, without long-term current use of insulin (HCC)  • Smoking  • Alcohol abuse  • Gastritis      Admitting Diagnosis: Alcohol abuse [F10.10]  Gastritis [K29.70]  Abdominal pain [R10.9]  KADE (acute kidney injury) ([facility]) [N17.9]  Age/Sex: 68 y.o. male       Admission Orders: CIWA, continuous pulse oximetry, SCD. Scheduled Medications:    aspirin, 81 mg, Oral, Daily  atorvastatin, 20 mg, Oral, Daily  clopidogrel, 75 mg, Oral, Daily  folic acid, 1 mg, Oral, Daily  heparin (porcine), 5,000 Units, Subcutaneous, Q8H FLACO  insulin lispro, 1-5 Units, Subcutaneous, Q6H FLACO  Lidocaine Viscous HCl, 15 mL, Swish & Spit, Once  mirtazapine, 15 mg, Oral, HS  multivitamin-minerals, 1 tablet, Oral, Daily  nicotine, 1 patch, Transdermal, Daily  pantoprazole, 40 mg, Intravenous, Q12H FLACO  pregabalin, 300 mg, Oral, TID  sucralfate, 1 g, Oral, 4x Daily (AC & HS)  thiamine, 100 mg, Oral, Daily      Continuous IV Infusions:    multi-electrolyte, 100 mL/hr, Intravenous, Continuous      PRN Meds:  acetaminophen, 650 mg, Oral, Q6H PRN  aluminum-magnesium hydroxide-simethicone, 30 mL, Oral, Q6H PRN  dicyclomine, 10 mg, Oral, Q6H PRN  hydrALAZINE, 10 mg, Intravenous, Q4H PRN  ondansetron, 4 mg, Intravenous, Q6H PRN        IP CONSULT TO GASTROENTEROLOGY      Network Utilization Review Department  ATTENTION: Please call with any questions or concerns to [phone] and carefully listen to the prompts so that you are directed to the right person.  All voicemails are confidential.  Ron Kothari all requests for admission clinical reviews, approved or denied determinations and any other requests to dedicated fax number below belonging to the campus where the patient is receiving treatment.  List of dedicated fax numbers for the Facilities:  [facility] (Administrative/Medical Necessity) [phone] [address] (Maternity/NICU/Pediatrics) [phone]   [address] [place] [address] Drive [phone]   [facility] [address] [facility] [phone]   [address] [place] [address] [address] [place] [address] [id number] [place] [address] [phone]   [address] [facility] [address] [address] [phone]

## 2023-08-19 NOTE — ASSESSMENT & PLAN NOTE
40-year-old male with PMH of alcohol abuse, hypertension, diabetes and hyperlipidemia presented with nausea vomiting and abdominal pain  Suspect likely in the setting of alcohol abuse, tobacco use  CT imaging showing gastritis versus peptic ulcer disease  Maintain clear liquid diet, IV PPI twice daily, Carafate  GI consulted

## 2023-08-19 NOTE — ASSESSMENT & PLAN NOTE
Continues to drink alcohol, up to 1 handle of liquor daily, last drink was 08/18  WA protocol, multivitamins (FA and thiamine).  No current evidence of withdrawal

## 2023-08-19 NOTE — ANESTHESIA POSTPROCEDURE EVALUATION
Post-Op Assessment Note    CV Status:  Stable  Pain Score: 0    Pain management: adequate     Mental Status:  Awake and sleepy   Hydration Status:  Euvolemic   PONV Controlled:  Controlled   Airway Patency:  Patent and adequate   Two or more mitigation strategies used for obstructive sleep apnea   Post Op Vitals Reviewed: Yes      Staff: CRNA         No notable events documented.     /61 (08/19/23 1247)    Temp 97.8 °F (36.6 °C) (08/19/23 1247)    Pulse 98 (08/19/23 1247)   Resp 14 (08/19/23 1247)    SpO2 95 % (08/19/23 1247)

## 2023-08-19 NOTE — ASSESSMENT & PLAN NOTE
Hold lisinopril in setting of KADE, blood pressure elevated on admission  Add IV hydralazine as needed

## 2023-08-19 NOTE — ANESTHESIA PREPROCEDURE EVALUATION
Procedure:  EGD    Relevant Problems   ANESTHESIA (within normal limits)   (-) History of anesthesia complications      CARDIO   (+) Coronary artery disease involving native coronary artery of native heart without angina pectoris   (+) Essential hypertension   (+) Hyperlipidemia   (+) Peripheral vascular disease (HCC)      ENDO   (+) Type 2 diabetes mellitus with diabetic polyneuropathy, without long-term current use of insulin (HCC)      GI/HEPATIC   (+) Chronic hepatitis C (HCC)   (+) Other cirrhosis of liver (HCC)      /RENAL   (+) KADE (acute kidney injury) ([address])      HEMATOLOGY (within normal limits)      MUSCULOSKELETAL   (+) Arthritis      NEURO/PSYCH   (+) Depression      PULMONARY   (+) Smoking   (-) URI (upper respiratory infection)      Other   (+) Alcoholism (HCC)        Physical Exam    Airway    Mallampati score: III  TM Distance: >3 FB  Neck ROM: full     Dental   upper dentures,     Cardiovascular  Rhythm: regular, Rate: normal,     Pulmonary  Breath sounds clear to auscultation,     Other Findings        Anesthesia Plan  ASA Score- 3     Anesthesia Type- IV sedation with anesthesia with ASA Monitors. Additional Monitors:   Airway Plan:     Comment: I discussed the risks and benefits of IV sedation anesthesia including the possibility of the need to convert to general anesthesia and the potential risk of awareness. The patient was given the opportunity to ask questions, which were answered. .       Plan Factors-    Chart reviewed. EKG reviewed. Existing labs reviewed. Patient is a current smoker. Patient not instructed to abstain from smoking on day of procedure. Patient did not smoke on day of surgery. Induction- intravenous. Postoperative Plan-     Informed Consent- Anesthetic plan and risks discussed with patient. I personally reviewed this patient with the CRNA. Discussed and agreed on the Anesthesia Plan with the CRNA. .          Lab Results   Component Value Date    WBC 10.30 (H) 08/19/2023    HGB 12.4 08/19/2023    HCT 36.0 (L) 08/19/2023    MCV 93 08/19/2023     08/19/2023     Lab Results   Component Value Date    SODIUM 132 (L) 08/19/2023    K 4.5 08/19/2023    CL 96 08/19/2023    CO2 22 08/19/2023    BUN 28 (H) 08/19/2023    CREATININE 1.47 (H) 08/19/2023    GLUC 118 08/19/2023    CALCIUM 9.2 08/19/2023     Lab Results   Component Value Date    ALT 19 08/19/2023    AST 29 08/19/2023    ALKPHOS 47 08/19/2023     Lab Results   Component Value Date    INR 1.11 10/17/2019    PROTIME 14.3 10/17/2019     Lab Results   Component Value Date    HGBA1C 6.9 (H) 10/18/2019

## 2023-08-19 NOTE — ASSESSMENT & PLAN NOTE
Continues to drink alcohol, up to 1 handle of liquor daily, last drink was today  WA protocol, multivitamins

## 2023-08-19 NOTE — ASSESSMENT & PLAN NOTE
Lab Results   Component Value Date    HGBA1C 6.9 (H) 10/18/2019     Hold home medications  Sign scale and Accu-Cheks every 6  (P) 117.5

## 2023-08-19 NOTE — PROGRESS NOTES
[address]  Progress Note  Name: Jacqueline Cornell  MRN: [id number]  Unit/Bed#: -01 I Date of Admission: 8/18/2023   Date of Service: 8/19/2023 I Hospital Day: 1    Assessment/Plan   KADE (acute kidney injury) [facility])  Assessment & Plan  KADE likely in setting of poor p.o. intake, nausea vomiting  Baseline of 1, 1.9 admission  Treat with IV fluids, hold lisinopril    Essential hypertension  Assessment & Plan  Hold lisinopril in setting of KADE, blood pressure elevated on admission  Add IV hydralazine as needed    Hyperlipidemia  Assessment & Plan  Continue statin    Alcoholism ([address])  Assessment & Plan  Continues to drink alcohol, up to 1 handle of liquor daily, last drink was 08/18  CIWA protocol, multivitamins (FA and thiamine). No current evidence of withdrawal    Type 2 diabetes mellitus with diabetic polyneuropathy, without long-term current use of insulin ([address])  Assessment & Plan  Lab Results   Component Value Date    HGBA1C 6.9 (H) 10/18/2019     Hold home medications  Sign scale and Accu-Cheks every 6  (P) 117.5    * Abdominal pain  Assessment & Plan  Suspect likely in the setting of alcohol abuse, tobacco use  CT imaging showing gastritis versus peptic ulcer disease  Maintain clear liquid diet, IV PPI twice daily, Carafate  GI consulted, pending EGD results. Subjective/Objective     Subjective:   Patient continues to report mild epigastric pain that is improving. Pending EGD today by GI. No signs of bleeding or evidence of withdrawal.    Objective:  Vitals: Blood pressure 155/64, pulse 96, temperature 98 °F (36.7 °C), temperature source Oral, resp. rate 18, height 5' 5" (1.651 m), weight 64 kg (141 lb 1.5 oz), SpO2 92 %. ,Body mass index is 23.48 kg/m².     No intake or output data in the 24 hours ending 08/20/23 1428    Invasive Devices     None                 Physical Exam: /64   Pulse 96   Temp 98 °F (36.7 °C) (Oral)   Resp 18   Ht 5' 5" (1.651 m)   Wt 64 kg (141 lb 1.5 oz)   SpO2 92%   BMI 23.48 kg/m²     General Appearance:    Alert, cooperative, no distress, appears stated age   Head:    Normocephalic, without obvious abnormality, atraumatic   Eyes:    PERRL, conjunctiva/corneas clear, EOM's intact, fundi     benign, both eyes        Ears:    Normal TM's and external ear canals, both ears   Nose:   Nares normal, septum midline, mucosa normal, no drainage    or sinus tenderness   Throat:   Lips, mucosa, and tongue normal; teeth and gums normal   Neck:   Supple, symmetrical, trachea midline, no adenopathy;        thyroid:  No enlargement/tenderness/nodules; no carotid    bruit or JVD   Back:     Symmetric, no curvature, ROM normal, no CVA tenderness   Lungs:     Clear to auscultation bilaterally, respirations unlabored   Chest wall:    No tenderness or deformity   Heart:    Regular rate and rhythm, S1 and S2 normal, no murmur, rub   or gallop   Abdomen:     Soft, non-tender, bowel sounds active all four quadrants,     no masses, no organomegaly   Genitalia:    Normal male without lesion, discharge or tenderness   Rectal:    Normal tone, normal prostate, no masses or tenderness;    guaiac negative stool   Extremities:   Extremities normal, atraumatic, no cyanosis or edema   Pulses:   2+ and symmetric all extremities   Skin:   Skin color, texture, turgor normal, no rashes or lesions   Lymph nodes:   Cervical, supraclavicular, and axillary nodes normal   Neurologic:   CNII-XII intact. Normal strength, sensation and reflexes       throughout       Lab, Imaging and other studies: I have personally reviewed pertinent reports.     VTE Pharmacologic Prophylaxis: Heparin  VTE Mechanical Prophylaxis: sequential compression device

## 2023-08-19 NOTE — ASSESSMENT & PLAN NOTE
Lab Results   Component Value Date    HGBA1C 6.9 (H) 10/18/2019     Hold home medications  Sign scale and Accu-Cheks every 6

## 2023-08-19 NOTE — ASSESSMENT & PLAN NOTE
Suspect likely in the setting of alcohol abuse, tobacco use  CT imaging showing gastritis versus peptic ulcer disease  Maintain clear liquid diet, IV PPI twice daily, Carafate  GI consulted, pending EGD results.

## 2023-08-19 NOTE — CONSULTS
Consultation - [facility]) [facility]  Vickie Boone 68 y.o. male MRN: [id number]  Unit/Bed#: -01 Encounter: [id number]      Inpatient consult to gastroenterology  Consult performed by: Stan Franklin PA-C  Consult ordered by: Lee Romero MD          Reason for Consult / Principal Problem: Gastritis    HPI: Vickie Boone is a 68y.o. year old male who presents with a past medical history significant for type 2 diabetes, hypertension, alcohol abuse, tobacco abuse, and hyperlipidemia. Patient presents to [facility] emergency department yesterday with a chief complaint of abdominal pain. At the present time speaking to patient he reports that he is not only having abdominal pain but he is reporting diffuse body pain and weakness. He reports associated nausea but no episodes of vomiting. Patient has a longstanding history of alcohol abuse. I did asked patient if he is still drinking and he did report to me initially that yes he is still drinking alcohol but then got agitated when I asked him how much and further questioned him. 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  Patient reports that he has not had a bowel movement since admission. Patient denies any melena or rectal bleeding. Patient reports a decreased appetite. Per hospital record patient is drinking about a handle of liquor daily. Patient's last alcoholic beverage was 2 days ago. CTAP from 8/18/2023 shows mild thickening of the distal stomach and proximal duodenum. Patient does have an KADE. Patient's liver enzyme panel essentially unremarkable except for a bilirubin level of 1.08. Patient's hemoglobin level remained stable. REVIEW OF SYSTEMS:     CONSTITUTIONAL: Denies any fever, chills, or rigors. Good appetite, and no recent weight loss. HEENT: No earache or tinnitus. Denies hearing loss or visual disturbances. CARDIOVASCULAR: No chest pain or palpitations.    RESPIRATORY: Denies any cough, hemoptysis, shortness of breath or dyspnea on exertion. GASTROINTESTINAL: As noted in the History of Present Illness. GENITOURINARY: No problems with urination. Denies any hematuria or dysuria. NEUROLOGIC: No dizziness or vertigo, denies headaches. MUSCULOSKELETAL: Denies any muscle or joint pain. SKIN: Denies skin rashes or itching. ENDOCRINE: Denies excessive thirst. Denies intolerance to heat or cold. PSYCHOSOCIAL: Denies depression or anxiety. Denies any recent memory loss. Historical Information   Past Medical History:   Diagnosis Date   • Chest pain    • Chronic hepatitis C ([facility]) 3/15/2013   • Diabetes mellitus ([address] [place] [address])    • Heartburn    • Hepatitis C    • Indigestion    • Liver disease    • Myocardial infarction ([address] [place] [address])    • Seizures ([address] [place] [address])      History reviewed. No pertinent surgical history.   Social History   Social History     Substance and Sexual Activity   Alcohol Use Yes   • Alcohol/week: 21.0 standard drinks of alcohol   • Types: 21 Cans of beer per week     Social History     Substance and Sexual Activity   Drug Use Not Currently   • Types: Marijuana     Social History     Tobacco Use   Smoking Status Every Day   • Packs/day: 0.50   • Types: Cigarettes   Smokeless Tobacco Never     Family History   Problem Relation Age of Onset   • Heart attack Mother    • Diabetes Mother    • Colon cancer Father    • Diabetes Father    • Alcohol abuse Neg Hx    • Substance Abuse Neg Hx    • Mental illness Neg Hx    • Depression Neg Hx        Meds/Allergies     Medications Prior to Admission   Medication   • aspirin 81 mg chewable tablet   • atorvastatin (LIPITOR) 20 mg tablet   • Blood Pressure Monitor MARIELLA   • clopidogrel (PLAVIX) 75 mg tablet   • ferrous sulfate 325 (65 Fe) mg tablet   • folic acid (FOLVITE) 1 mg tablet   • gabapentin (NEURONTIN) 100 mg capsule   • glucose blood test strip   • glucose monitoring kit (FREESTYLE) monitoring kit   • Lancets (FREESTYLE) lancets   • lisinopril (ZESTRIL) 20 mg tablet   • magnesium hydroxide (MILK OF MAGNESIA) 400 mg/5 mL oral suspension   • mirtazapine (REMERON) 15 mg tablet   • omega-3-acid ethyl esters (LOVAZA) 1 g capsule   • omeprazole (PriLOSEC) 20 mg delayed release capsule   • ondansetron (ZOFRAN) 4 mg tablet   • ondansetron (ZOFRAN) 4 mg tablet   • oxyCODONE-acetaminophen (Percocet) 5-325 mg per tablet   • pantoprazole (PROTONIX) 40 mg tablet   • pregabalin (LYRICA) 300 MG capsule   • sitaGLIPtin-metFORMIN (JANUMET)  MG per tablet   • sucralfate (CARAFATE) 1 g/10 mL suspension   • thiamine 100 MG tablet     Current Facility-Administered Medications   Medication Dose Route Frequency   • acetaminophen (TYLENOL) tablet 650 mg  650 mg Oral Q6H PRN   • aluminum-magnesium hydroxide-simethicone (MAALOX) oral suspension 30 mL  30 mL Oral Q6H PRN   • aspirin chewable tablet 81 mg  81 mg Oral Daily   • atorvastatin (LIPITOR) tablet 20 mg  20 mg Oral Daily   • clopidogrel (PLAVIX) tablet 75 mg  75 mg Oral Daily   • dicyclomine (BENTYL) capsule 10 mg  10 mg Oral K4G PRN   • folic acid (FOLVITE) tablet 1 mg  1 mg Oral Daily   • heparin (porcine) subcutaneous injection 5,000 Units  5,000 Units Subcutaneous Q8H [address]   • hydrALAZINE (APRESOLINE) injection 10 mg  10 mg Intravenous Q4H PRN   • insulin lispro (HumaLOG) 100 units/mL subcutaneous injection 1-5 Units  1-5 Units Subcutaneous Q6H FLACO   • Lidocaine Viscous HCl (XYLOCAINE) 2 % mucosal solution 15 mL  15 mL Swish & Spit Once   • mirtazapine (REMERON) tablet 15 mg  15 mg Oral HS   • multi-electrolyte (PLASMALYTE-A/ISOLYTE-S PH 7.4) IV solution  100 mL/hr Intravenous Continuous   • multivitamin-minerals (CENTRUM) tablet 1 tablet  1 tablet Oral Daily   • nicotine (NICODERM CQ) 14 mg/24hr TD 24 hr patch 1 patch  1 patch Transdermal Daily   • ondansetron (ZOFRAN) injection 4 mg  4 mg Intravenous Q6H PRN   • pantoprazole (PROTONIX) injection 40 mg  40 mg Intravenous Q12H FLACO   • pregabalin (LYRICA) capsule 300 mg  300 mg Oral TID   • sucralfate (CARAFATE) tablet 1 g  1 g Oral 4x Daily (AC & HS)   • thiamine tablet 100 mg  100 mg Oral Daily       No Known Allergies    Objective   Blood pressure 152/62, pulse 72, temperature 98.4 °F (36.9 °C), resp. rate 18, height 5' 5" (1.651 m), weight 70.3 kg (154 lb 15.7 oz), SpO2 97 %. No intake or output data in the 24 hours ending 08/19/23 0938      PHYSICAL EXAM:      General Appearance:   Alert, cooperative, no distress, appears stated age    HEENT:   Normocephalic, atraumatic, anicteric.     Neck:  Supple, symmetrical, trachea midline, no adenopathy;    thyroid: no enlargement/tenderness/nodules; no carotid  bruit or JVD    Lungs:   Clear to auscultation bilaterally; no rales, rhonchi or wheezing; respirations unlabored    Heart[de-identified]   S1 and S2 normal; regular rate and rhythm; no murmur, rub, or gallop. Abdomen:    Patient does have positive bowel sounds in all 4 quadrants. Patient's abdomen is soft but is tender in the epigastrium.    Genitalia:   Deferred    Rectal:   Deferred    Extremities:  No cyanosis, clubbing or edema    Pulses:  2+ and symmetric all extremities    Skin:  Skin color, texture, turgor normal, no rashes or lesions    Lymph nodes:  No palpable cervical, axillary or inguinal lymphadenopathy        Lab Results:   Admission on 08/18/2023   Component Date Value   • WBC 08/18/2023 7.75    • RBC 08/18/2023 4.14    • Hemoglobin 08/18/2023 13.0    • Hematocrit 08/18/2023 38.4    • MCV 08/18/2023 93    • MCH 08/18/2023 31.4    • MCHC 08/18/2023 33.9    • RDW 08/18/2023 16.0 (H)    • MPV 08/18/2023 10.0    • Platelets 53/75/6641 186    • nRBC 08/18/2023 0    • Neutrophils Relative 08/18/2023 71    • Immat GRANS % 08/18/2023 0    • Lymphocytes Relative 08/18/2023 19    • Monocytes Relative 08/18/2023 9    • Eosinophils Relative 08/18/2023 1    • Basophils Relative 08/18/2023 0    • Neutrophils Absolute 08/18/2023 5.46    • Immature Grans Absolute 08/18/2023 0.03    • Lymphocytes Absolute 08/18/2023 1.49    • Monocytes Absolute 08/18/2023 0.69    • Eosinophils Absolute 08/18/2023 0.05    • Basophils Absolute 08/18/2023 0.03    • Sodium 08/18/2023 132 (L)    • Potassium 08/18/2023 4.5    • Chloride 08/18/2023 97    • CO2 08/18/2023 23    • ANION GAP 08/18/2023 12    • BUN 08/18/2023 40 (H)    • Creatinine 08/18/2023 1.91 (H)    • Glucose 08/18/2023 142 (H)    • Calcium 08/18/2023 9.9    • AST 08/18/2023 39    • ALT 08/18/2023 24    • Alkaline Phosphatase 08/18/2023 54    • Total Protein 08/18/2023 8.4    • Albumin 08/18/2023 5.0    • Total Bilirubin 08/18/2023 1.08 (H)    • eGFR 08/18/2023 33    • Lipase 08/18/2023 13    • Ventricular Rate 08/18/2023 56    • Atrial Rate 08/18/2023 41    • QRSD Interval 08/18/2023 76    • QT Interval 08/18/2023 462    • QTC Interval 08/18/2023 445    • QRS Axis 08/18/2023 52    • T Wave Axis 08/18/2023 58    • LACTIC ACID 08/18/2023 1.6    • Magnesium 08/18/2023 1.9    • hs TnI 0hr 08/18/2023 21    • hs TnI 2hr 08/18/2023 82 (H)    • Delta 2hr hsTnI 08/18/2023 61 (H)    • Ethanol Lvl 08/18/2023 <10    • hs TnI 4hr 08/19/2023 49    • Delta 4hr hsTnI 08/19/2023 28 (H)    • POC Glucose 08/18/2023 124    • Sodium 08/19/2023 132 (L)    • Potassium 08/19/2023 4.5    • Chloride 08/19/2023 96    • CO2 08/19/2023 22    • ANION GAP 08/19/2023 14    • BUN 08/19/2023 28 (H)    • Creatinine 08/19/2023 1.47 (H)    • Glucose 08/19/2023 118    • Calcium 08/19/2023 9.2    • AST 08/19/2023 29    • ALT 08/19/2023 19    • Alkaline Phosphatase 08/19/2023 47    • Total Protein 08/19/2023 7.0    • Albumin 08/19/2023 4.3    • Total Bilirubin 08/19/2023 0.94    • eGFR 08/19/2023 45    • WBC 08/19/2023 10.30 (H)    • RBC 08/19/2023 3.87 (L)    • Hemoglobin 08/19/2023 12.4    • Hematocrit 08/19/2023 36.0 (L)    • MCV 08/19/2023 93    • MCH 08/19/2023 32.0    • MCHC 08/19/2023 34.4    • RDW 08/19/2023 16.1 (H)    • Platelets 73/28/1477 158    • MPV 08/19/2023 9.6    • POC Glucose 08/19/2023 111        Imaging Studies: I have personally reviewed pertinent imaging studies. ASSESSMENT & PLAN:  Gastritis on imaging  Abdominal pain  Alcohol abuse  -Patient presents to the [facility] emergency department with a chief complaint of abdominal pain and nausea. -Patient has a longstanding history of alcohol abuse. -CTAP from admission does show evidence of gastritis and duodenitis.  -Patient is currently n.p.o. in preparation for an EGD with biopsy today. -Recommend complete alcohol and tobacco cessation.  -Diet as tolerated after EGD is complete.  -Continue to monitor CBC and stool color.  -Strict blood sugar controls.  -Pantoprazole 40mg BID. Patient be seen and examined by Dr. Leyda Yao. All key medical decisions made by Dr. Leyda Yao.     Matilda Escalona PA-C  8/19/2023,9:38 AM

## 2023-08-19 NOTE — ASSESSMENT & PLAN NOTE
KADE likely in setting of poor p.o. intake, nausea vomiting  Baseline of 1, 1.9 admission  Treat with IV fluids, hold lisinopril

## 2023-08-19 NOTE — H&P
[address]  H&P  Name: Derek Thompson 68 y.o. male I MRN: [id number]  Unit/Bed#: -01 I Date of Admission: 8/18/2023   Date of Service: 8/18/2023 I Hospital Day: 0      Assessment/Plan   KADE (acute kidney injury) ([address])  Assessment & Plan  KADE likely in setting of poor p.o. intake, nausea vomiting  Baseline of 1, 1.9 admission  Treat with IV fluids, hold lisinopril    Essential hypertension  Assessment & Plan  Hold lisinopril in setting of KADE, blood pressure elevated on admission  Add IV hydralazine as needed    Hyperlipidemia  Assessment & Plan  Continue statin    Alcoholism ([address])  Assessment & Plan  Continues to drink alcohol, up to 1 handle of liquor daily, last drink was today  CIWA protocol, multivitamins    Type 2 diabetes mellitus with diabetic polyneuropathy, without long-term current use of insulin (HCC)  Assessment & Plan  Lab Results   Component Value Date    HGBA1C 6.9 (H) 10/18/2019     Hold home medications  Sign scale and Accu-Cheks every 6      * Abdominal pain  Assessment & Plan  72-year-old male with PMH of alcohol abuse, hypertension, diabetes and hyperlipidemia presented with nausea vomiting and abdominal pain  Suspect likely in the setting of alcohol abuse, tobacco use  CT imaging showing gastritis versus peptic ulcer disease  Maintain clear liquid diet, IV PPI twice daily, Carafate  GI consulted         VTE Prophylaxis: Heparin  / sequential compression device   Code Status: FC  POLST: There is no POLST form on file for this patient (pre-hospital)    Anticipated Length of Stay:  Patient will be admitted on an Inpatient basis with an anticipated length of stay of 2 midnights. Justification for Hospital Stay: GI evaluation, pain control    Chief Complaint:   Abdominal Pain    History of Present Illness:    Derek Thompson is a 68 y.o. male who presents with abdominal pain, nausea vomiting. PMH of T2DM, HTN, HLD, alcohol abuse and tobacco use.   Patient reportedly had been drinking over the last several weeks, last use 1 bottle of liquor. Reported developing severe abdominal pain, initially concern for pancreatitis. Lipase was negative, CT imaging showing gastritis versus peptic ulcer disease. Denies any fevers, chills, chest pain or shortness of breath. Review of Systems:    Review of Systems   Gastrointestinal: Positive for abdominal pain, nausea and vomiting. All other systems reviewed and are negative. Past Medical and Surgical History:     Past Medical History:   Diagnosis Date   • Chest pain    • Chronic hepatitis C ([address]) 3/15/2013   • Diabetes mellitus ([facility])    • Heartburn    • Hepatitis C    • Indigestion    • Liver disease    • Myocardial infarction ([address]    • Seizures ([facility])        History reviewed. No pertinent surgical history. Meds/Allergies:    Prior to Admission medications    Medication Sig Start Date End Date Taking?  Authorizing Provider   aspirin 81 mg chewable tablet Chew 81 mg daily    Historical Provider, MD   atorvastatin (LIPITOR) 20 mg tablet Take 1 tablet (20 mg total) by mouth daily 11/26/19   NARCISO Deshpande   Blood Pressure Monitor MARIELLA by Does not apply route daily 11/26/19   NARCISO Deshpande   clopidogrel (PLAVIX) 75 mg tablet Take 1 tablet (75 mg total) by mouth daily 11/26/19   NARCISO Deshpande   ferrous sulfate 325 (65 Fe) mg tablet Take 325 mg by mouth daily with breakfast    Historical Provider, MD   folic acid (FOLVITE) 1 mg tablet Take 1 tablet (1 mg total) by mouth daily 11/25/19   Rosa Alonzo MD   gabapentin (NEURONTIN) 100 mg capsule take 1 capsule by mouth three times a day 11/18/20   NARCISO Deshpande   glucose blood test strip Use as instructed 11/26/19   NARCISO Deshpande   glucose monitoring kit (FREESTYLE) monitoring kit 1 each by Does not apply route daily 11/26/19   NARCISO Deshpande   Lancets (FREESTYLE) lancets Use as instructed 11/26/19   NARCISO Burciaga lisinopril (ZESTRIL) 20 mg tablet Take 1 tablet (20 mg total) by mouth daily 11/26/19   NARCISO Gates   magnesium hydroxide (MILK OF MAGNESIA) 400 mg/5 mL oral suspension Take 30 mL by mouth daily as needed for constipation 6/5/23   Ole Lozano PA-C   mirtazapine (REMERON) 15 mg tablet Take 1 tablet (15 mg total) by mouth daily at bedtime 11/26/19   NARCISO Gates   omega-3-acid ethyl esters (LOVAZA) 1 g capsule Take 2 capsules by mouth 2 (two) times a day 1/23/12   Historical Provider, MD   omeprazole (PriLOSEC) 20 mg delayed release capsule Take 1 capsule (20 mg total) by mouth daily 12/6/22   Melvin Díaz MD   ondansetron (ZOFRAN) 4 mg tablet Take 1 tablet (4 mg total) by mouth every 6 (six) hours 12/6/22   Melvin Díaz MD   ondansetron (ZOFRAN) 4 mg tablet Take 1 tablet (4 mg total) by mouth every 6 (six) hours 6/5/23   Ole Lozano PA-C   oxyCODONE-acetaminophen (Percocet) 5-325 mg per tablet Take 1 tablet by mouth every 6 (six) hours as needed for severe pain (dx abdominal pain/pancreatitis. ongoing rx.) Max Daily Amount: 4 tablets 6/5/23   Jem Hilliard PA-C   pantoprazole (PROTONIX) 40 mg tablet Take 1 tablet (40 mg total) by mouth 2 (two) times a day for 14 days 9/25/20 10/9/20  Trina Caban PA-C   pregabalin (LYRICA) 300 MG capsule Take 300 mg by mouth 3 (three) times a day    Historical Provider, MD   sitaGLIPtin-metFORMIN (JANUMET)  MG per tablet Take 1 tablet by mouth 2 (two) times a day with meals 11/26/19   NARCISO Gates   sucralfate (CARAFATE) 1 g/10 mL suspension Take 10 mL (1 g total) by mouth 4 (four) times a day 9/25/20   Damián Bagley PA-C   thiamine 100 MG tablet Take 1 tablet (100 mg total) by mouth daily 11/25/19   Alma Kearney MD     I have reviewed home medications with patient personally.     Allergies: No Known Allergies    Social History:     Marital Status: /Civil Union   Occupation: retired  Patient Pre-hospital Living Situation: home  Patient Pre-hospital Level of Mobility: amb  Patient Pre-hospital Diet Restrictions: none  Substance Use History:   Social History     Substance and Sexual Activity   Alcohol Use Yes   • Alcohol/week: 21.0 standard drinks of alcohol   • Types: 21 Cans of beer per week     Social History     Tobacco Use   Smoking Status Every Day   • Packs/day: 0.50   • Types: Cigarettes   Smokeless Tobacco Never     Social History     Substance and Sexual Activity   Drug Use Not Currently   • Types: Marijuana       Family History:    Family History   Problem Relation Age of Onset   • Heart attack Mother    • Diabetes Mother    • Colon cancer Father    • Diabetes Father    • Alcohol abuse Neg Hx    • Substance Abuse Neg Hx    • Mental illness Neg Hx    • Depression Neg Hx        Physical Exam:     Vitals:   Blood Pressure: (!) 176/86 (08/18/23 1853)  Pulse: 64 (08/18/23 1853)  Temperature: 98.2 °F (36.8 °C) (08/18/23 1609)  Temp Source: Oral (08/18/23 1609)  Respirations: 16 (08/18/23 1853)  SpO2: 95 % (08/18/23 1853)    Physical Exam  Vitals and nursing note reviewed. Constitutional:       Comments: Appears disheveled   HENT:      Head: Normocephalic. Eyes:      General: No scleral icterus. Conjunctiva/sclera: Conjunctivae normal.   Cardiovascular:      Rate and Rhythm: Normal rate. Pulmonary:      Breath sounds: No wheezing or rhonchi. Abdominal:      General: Bowel sounds are normal. There is no distension. Tenderness: There is abdominal tenderness ( Epigastric tenderness). Musculoskeletal:         General: No swelling. Skin:     General: Skin is warm. Neurological:      General: No focal deficit present. Mental Status: He is alert. Mental status is at baseline. Additional Data:     Lab Results: I have personally reviewed pertinent reports.       Results from last 7 days   Lab Units 08/18/23  1621   WBC Thousand/uL 7.75   HEMOGLOBIN g/dL 13.0 HEMATOCRIT % 38.4   PLATELETS Thousands/uL 186   NEUTROS PCT % 71   LYMPHS PCT % 19   MONOS PCT % 9   EOS PCT % 1     Results from last 7 days   Lab Units 08/18/23  1648   SODIUM mmol/L 132*   POTASSIUM mmol/L 4.5   CHLORIDE mmol/L 97   CO2 mmol/L 23   BUN mg/dL 40*   CREATININE mg/dL 1.91*   ANION GAP mmol/L 12   CALCIUM mg/dL 9.9   ALBUMIN g/dL 5.0   TOTAL BILIRUBIN mg/dL 1.08*   ALK PHOS U/L 54   ALT U/L 24   AST U/L 39   GLUCOSE RANDOM mg/dL 142*                 Results from last 7 days   Lab Units 08/18/23  1811   LACTIC ACID mmol/L 1.6       Imaging: I have personally reviewed pertinent reports. CT abdomen pelvis wo contrast   Final Result by Satinder Butt MD (08/18 1808)      Similar mild thickening of the distal stomach and proximal duodenum compared to prior studies, again nonspecific but can be seen with gastroenteritis or peptic ulcer disease. No bowel obstruction. Otherwise no acute findings in the abdomen or pelvis to explain the patient's upper abdominal pain. Workstation performed: [id number]             EKG, Pathology, and Other Studies Reviewed on Admission:   · EKG: Sinus Bradycardia    Allscripts / Epic Records Reviewed: Yes     ** Please Note: This note has been constructed using a voice recognition system.  **

## 2023-08-19 NOTE — PLAN OF CARE
Problem: MOBILITY - ADULT  Goal: Maintain or return to baseline ADL function  Description: INTERVENTIONS:  -  Assess patient's ability to carry out ADLs; assess patient's baseline for ADL function and identify physical deficits which impact ability to perform ADLs (bathing, care of mouth/teeth, toileting, grooming, dressing, etc.)  - Assess/evaluate cause of self-care deficits   - Assess range of motion  - Assess patient's mobility; develop plan if impaired  - Assess patient's need for assistive devices and provide as appropriate  - Encourage maximum independence but intervene and supervise when necessary  - Involve family in performance of ADLs  - Assess for home care needs following discharge   - Consider OT consult to assist with ADL evaluation and planning for discharge  - Provide patient education as appropriate  Outcome: Progressing  Goal: Maintains/Returns to pre admission functional level  Description: INTERVENTIONS:  - Perform BMAT or MOVE assessment daily.   - Set and communicate daily mobility goal to care team and patient/family/caregiver. - Collaborate with rehabilitation services on mobility goals if consulted  - Perform Range of Motion 4 times a day. - Reposition patient every 4 hours.   - Dangle patient 4 times a day  - Stand patient 4 times a day  - Ambulate patient 4 times a day  - Out of bed to chair 3 times a day   - Out of bed for meals 3 times a day  - Out of bed for toileting  - Record patient progress and toleration of activity level   Outcome: Progressing     Problem: PAIN - ADULT  Goal: Verbalizes/displays adequate comfort level or baseline comfort level  Description: Interventions:  - Encourage patient to monitor pain and request assistance  - Assess pain using appropriate pain scale  - Administer analgesics based on type and severity of pain and evaluate response  - Implement non-pharmacological measures as appropriate and evaluate response  - Consider cultural and social influences on pain and pain management  - Notify physician/advanced practitioner if interventions unsuccessful or patient reports new pain  Outcome: Progressing     Problem: INFECTION - ADULT  Goal: Absence or prevention of progression during hospitalization  Description: INTERVENTIONS:  - Assess and monitor for signs and symptoms of infection  - Monitor lab/diagnostic results  - Monitor all insertion sites, i.e. indwelling lines, tubes, and drains  - Monitor endotracheal if appropriate and nasal secretions for changes in amount and color  - [place] appropriate cooling/warming therapies per order  - Administer medications as ordered  - Instruct and encourage patient and family to use good hand hygiene technique  - Identify and instruct in appropriate isolation precautions for identified infection/condition  Outcome: Progressing  Goal: Absence of fever/infection during neutropenic period  Description: INTERVENTIONS:  - Monitor WBC    Outcome: Progressing     Problem: SAFETY ADULT  Goal: Maintain or return to baseline ADL function  Description: INTERVENTIONS:  -  Assess patient's ability to carry out ADLs; assess patient's baseline for ADL function and identify physical deficits which impact ability to perform ADLs (bathing, care of mouth/teeth, toileting, grooming, dressing, etc.)  - Assess/evaluate cause of self-care deficits   - Assess range of motion  - Assess patient's mobility; develop plan if impaired  - Assess patient's need for assistive devices and provide as appropriate  - Encourage maximum independence but intervene and supervise when necessary  - Involve family in performance of ADLs  - Assess for home care needs following discharge   - Consider OT consult to assist with ADL evaluation and planning for discharge  - Provide patient education as appropriate  Outcome: Progressing  Goal: Maintains/Returns to pre admission functional level  Description: INTERVENTIONS:  - Perform BMAT or MOVE assessment daily.   - Set and communicate daily mobility goal to care team and patient/family/caregiver. - Collaborate with rehabilitation services on mobility goals if consulted  - Perform Range of Motion 4 times a day. - Reposition patient every 4 hours.   - Dangle patient 4 times a day  - Stand patient 4 times a day  - Ambulate patient 4 times a day  - Out of bed to chair 3 times a day   - Out of bed for meals 3 times a day  - Out of bed for toileting  - Record patient progress and toleration of activity level   Outcome: Progressing  Goal: Patient will remain free of falls  Description: INTERVENTIONS:  - Educate patient/family on patient safety including physical limitations  - Instruct patient to call for assistance with activity   - Consult OT/PT to assist with strengthening/mobility   - Keep Call bell within reach  - Keep bed low and locked with side rails adjusted as appropriate  - Keep care items and personal belongings within reach  - Initiate and maintain comfort rounds  - Make Fall Risk Sign visible to staff  - Offer Toileting every 2 Hours, in advance of need  - Initiate/Maintain bed alarm  - Obtain necessary fall risk management equipment: bed alarm, nonskid socks  - Apply yellow socks and bracelet for high fall risk patients  - Consider moving patient to room near nurses station  Outcome: Progressing     Problem: DISCHARGE PLANNING  Goal: Discharge to home or other facility with appropriate resources  Description: INTERVENTIONS:  - Identify barriers to discharge w/patient and caregiver  - Arrange for needed discharge resources and transportation as appropriate  - Identify discharge learning needs (meds, wound care, etc.)  - Arrange for interpretive services to assist at discharge as needed  - Refer to Case Management Department for coordinating discharge planning if the patient needs post-hospital services based on physician/advanced practitioner order or complex needs related to functional status, cognitive ability, or social support system  Outcome: Progressing     Problem: Knowledge Deficit  Goal: Patient/family/caregiver demonstrates understanding of disease process, treatment plan, medications, and discharge instructions  Description: Complete learning assessment and assess knowledge base.   Interventions:  - Provide teaching at level of understanding  - Provide teaching via preferred learning methods  Outcome: Progressing

## 2023-08-20 VITALS
HEIGHT: 65 IN | WEIGHT: 141.09 LBS | OXYGEN SATURATION: 92 % | RESPIRATION RATE: 18 BRPM | BODY MASS INDEX: 23.51 KG/M2 | DIASTOLIC BLOOD PRESSURE: 64 MMHG | SYSTOLIC BLOOD PRESSURE: 155 MMHG | TEMPERATURE: 98 F | HEART RATE: 96 BPM

## 2023-08-20 PROBLEM — N17.9 AKI (ACUTE KIDNEY INJURY) (HCC): Status: RESOLVED | Noted: 2023-08-18 | Resolved: 2023-08-20

## 2023-08-20 PROBLEM — K29.70 GASTRITIS: Status: ACTIVE | Noted: 2023-08-20

## 2023-08-20 PROBLEM — R10.9 ABDOMINAL PAIN: Status: RESOLVED | Noted: 2023-08-18 | Resolved: 2023-08-20

## 2023-08-20 PROBLEM — F10.10 ALCOHOL ABUSE: Status: ACTIVE | Noted: 2023-08-20

## 2023-08-20 LAB
GLUCOSE SERPL-MCNC: 365 MG/DL (ref 65–140)
GLUCOSE SERPL-MCNC: 369 MG/DL (ref 65–140)

## 2023-08-20 PROCEDURE — C9113 INJ PANTOPRAZOLE SODIUM, VIA: HCPCS | Performed by: INTERNAL MEDICINE

## 2023-08-20 PROCEDURE — 99238 HOSP IP/OBS DSCHRG MGMT 30/<: CPT | Performed by: INTERNAL MEDICINE

## 2023-08-20 PROCEDURE — 82948 REAGENT STRIP/BLOOD GLUCOSE: CPT

## 2023-08-20 RX ORDER — NICOTINE 21 MG/24HR
1 PATCH, TRANSDERMAL 24 HOURS TRANSDERMAL DAILY
Qty: 28 PATCH | Refills: 0 | Status: SHIPPED | OUTPATIENT
Start: 2023-08-21

## 2023-08-20 RX ADMIN — PANTOPRAZOLE SODIUM 40 MG: 40 INJECTION, POWDER, FOR SOLUTION INTRAVENOUS at 09:23

## 2023-08-20 RX ADMIN — HEPARIN SODIUM 5000 UNITS: 5000 INJECTION INTRAVENOUS; SUBCUTANEOUS at 05:54

## 2023-08-20 RX ADMIN — CLOPIDOGREL BISULFATE 75 MG: 75 TABLET ORAL at 09:23

## 2023-08-20 RX ADMIN — ASPIRIN 81 MG: 81 TABLET, CHEWABLE ORAL at 09:23

## 2023-08-20 RX ADMIN — FOLIC ACID 1 MG: 1 TABLET ORAL at 09:23

## 2023-08-20 RX ADMIN — THIAMINE HCL TAB 100 MG 100 MG: 100 TAB at 09:23

## 2023-08-20 RX ADMIN — INSULIN LISPRO 4 UNITS: 100 INJECTION, SOLUTION INTRAVENOUS; SUBCUTANEOUS at 08:00

## 2023-08-20 RX ADMIN — INSULIN LISPRO 4 UNITS: 100 INJECTION, SOLUTION INTRAVENOUS; SUBCUTANEOUS at 12:20

## 2023-08-20 RX ADMIN — SUCRALFATE 1 G: 1 TABLET ORAL at 12:17

## 2023-08-20 RX ADMIN — ATORVASTATIN CALCIUM 20 MG: 20 TABLET, FILM COATED ORAL at 09:23

## 2023-08-20 RX ADMIN — PREGABALIN 300 MG: 100 CAPSULE ORAL at 09:22

## 2023-08-20 RX ADMIN — Medication 1 TABLET: at 09:22

## 2023-08-20 RX ADMIN — SUCRALFATE 1 G: 1 TABLET ORAL at 06:02

## 2023-08-20 RX ADMIN — NICOTINE 1 PATCH: 14 PATCH, EXTENDED RELEASE TRANSDERMAL at 09:24

## 2023-08-20 NOTE — ASSESSMENT & PLAN NOTE
Hold lisinopril in setting of KADE, blood pressure elevated on admission, improved. Will follow-up with his PCP as outpatient. Can resume lisinprol.

## 2023-08-20 NOTE — PROGRESS NOTES
Patient alert and oriented. Patient tolerated medication during shift. Denies abdominal pain and discomfort. Patient continues on IV fluids during shift- new IV placed. Patient monitored for elevated glucose. Patients son visited patient during shift and updated on patients care. Son brought food in for patient. Patient ate the food during the shift. Patient educated on diabetes and diabetic diet. No deviation in assessment finding noted when compared to previous documentation when compared to previous documentation. Will continue to monitor.

## 2023-08-20 NOTE — PLAN OF CARE
Problem: MOBILITY - ADULT  Goal: Maintain or return to baseline ADL function  Description: INTERVENTIONS:  -  Assess patient's ability to carry out ADLs; assess patient's baseline for ADL function and identify physical deficits which impact ability to perform ADLs (bathing, care of mouth/teeth, toileting, grooming, dressing, etc.)  - Assess/evaluate cause of self-care deficits   - Assess range of motion  - Assess patient's mobility; develop plan if impaired  - Assess patient's need for assistive devices and provide as appropriate  - Encourage maximum independence but intervene and supervise when necessary  - Involve family in performance of ADLs  - Assess for home care needs following discharge   - Consider OT consult to assist with ADL evaluation and planning for discharge  - Provide patient education as appropriate  Outcome: Progressing  Goal: Maintains/Returns to pre admission functional level  Description: INTERVENTIONS:  - Perform BMAT or MOVE assessment daily.   - Set and communicate daily mobility goal to care team and patient/family/caregiver. - Collaborate with rehabilitation services on mobility goals if consulted  - Perform Range of Motion 3 times a day. - Reposition patient every 2 hours.   - Dangle patient 3 times a day  - Stand patient 3 times a day  - Ambulate patient 3 times a day  - Out of bed to chair 3 times a day   - Out of bed for meals 3 times a day  - Out of bed for toileting  - Record patient progress and toleration of activity level   Outcome: Progressing     Problem: PAIN - ADULT  Goal: Verbalizes/displays adequate comfort level or baseline comfort level  Description: Interventions:  - Encourage patient to monitor pain and request assistance  - Assess pain using appropriate pain scale  - Administer analgesics based on type and severity of pain and evaluate response  - Implement non-pharmacological measures as appropriate and evaluate response  - Consider cultural and social influences on pain and pain management  - Notify physician/advanced practitioner if interventions unsuccessful or patient reports new pain  Outcome: Progressing     Problem: INFECTION - ADULT  Goal: Absence or prevention of progression during hospitalization  Description: INTERVENTIONS:  - Assess and monitor for signs and symptoms of infection  - Monitor lab/diagnostic results  - Monitor all insertion sites, i.e. indwelling lines, tubes, and drains  - Monitor endotracheal if appropriate and nasal secretions for changes in amount and color  - [place] appropriate cooling/warming therapies per order  - Administer medications as ordered  - Instruct and encourage patient and family to use good hand hygiene technique  - Identify and instruct in appropriate isolation precautions for identified infection/condition  Outcome: Progressing  Goal: Absence of fever/infection during neutropenic period  Description: INTERVENTIONS:  - Monitor WBC    Outcome: Progressing     Problem: SAFETY ADULT  Goal: Maintain or return to baseline ADL function  Description: INTERVENTIONS:  -  Assess patient's ability to carry out ADLs; assess patient's baseline for ADL function and identify physical deficits which impact ability to perform ADLs (bathing, care of mouth/teeth, toileting, grooming, dressing, etc.)  - Assess/evaluate cause of self-care deficits   - Assess range of motion  - Assess patient's mobility; develop plan if impaired  - Assess patient's need for assistive devices and provide as appropriate  - Encourage maximum independence but intervene and supervise when necessary  - Involve family in performance of ADLs  - Assess for home care needs following discharge   - Consider OT consult to assist with ADL evaluation and planning for discharge  - Provide patient education as appropriate  Outcome: Progressing  Goal: Maintains/Returns to pre admission functional level  Description: INTERVENTIONS:  - Perform BMAT or MOVE assessment daily.   - Set and communicate daily mobility goal to care team and patient/family/caregiver. - Collaborate with rehabilitation services on mobility goals if consulted  - Perform Range of Motion 3 times a day. - Reposition patient every 2 hours.   - Dangle patient 3 times a day  - Stand patient 3 times a day  - Ambulate patient 3 times a day  - Out of bed to chair 3 times a day   - Out of bed for meals 3 times a day  - Out of bed for toileting  - Record patient progress and toleration of activity level   Outcome: Progressing  Goal: Patient will remain free of falls  Description: INTERVENTIONS:  - Educate patient/family on patient safety including physical limitations  - Instruct patient to call for assistance with activity   - Consult OT/PT to assist with strengthening/mobility   - Keep Call bell within reach  - Keep bed low and locked with side rails adjusted as appropriate  - Keep care items and personal belongings within reach  - Initiate and maintain comfort rounds  - Make Fall Risk Sign visible to staff  - Offer Toileting every 2 Hours, in advance of need  - Initiate/Maintain bed alarm  - Obtain necessary fall risk management equipment: call bell within reach  - Apply yellow socks and bracelet for high fall risk patients  - Consider moving patient to room near nurses station  Outcome: Progressing     Problem: DISCHARGE PLANNING  Goal: Discharge to home or other facility with appropriate resources  Description: INTERVENTIONS:  - Identify barriers to discharge w/patient and caregiver  - Arrange for needed discharge resources and transportation as appropriate  - Identify discharge learning needs (meds, wound care, etc.)  - Arrange for interpretive services to assist at discharge as needed  - Refer to Case Management Department for coordinating discharge planning if the patient needs post-hospital services based on physician/advanced practitioner order or complex needs related to functional status, cognitive ability, or social support system  Outcome: Progressing     Problem: Knowledge Deficit  Goal: Patient/family/caregiver demonstrates understanding of disease process, treatment plan, medications, and discharge instructions  Description: Complete learning assessment and assess knowledge base.   Interventions:  - Provide teaching at level of understanding  - Provide teaching via preferred learning methods  Outcome: Progressing

## 2023-08-20 NOTE — DISCHARGE SUMMARY
[address] [facility] [place] 1945, 68 y.o. male MRN: [id number]  Unit/Bed#: [facility] Encounter: [id number]  Primary Care Provider: No primary care provider on file. Date and time admitted to hospital: 8/18/2023  4:45 PM    Gastritis  Assessment & Plan  As evidenced in CT of his abdomen. No bleeding. Underwent EGD by GI that confirmed alcohol-induced gastritis. Continue PPI, and sucralfate    Essential hypertension  Assessment & Plan  Hold lisinopril in setting of KADE, blood pressure elevated on admission, improved. Will follow-up with his PCP as outpatient. Can resume lisinprol. Hyperlipidemia  Assessment & Plan  Continue statin    Alcoholism [facility])  Assessment & Plan  Continues to drink alcohol, up to 1 handle of liquor daily, last drink was 08/18  [facility]WA protocol, multivitamins (FA and thiamine). No current evidence of withdrawal. Not interested in cessation or support at this time. Evident tremors on physical examination.      Type 2 diabetes mellitus with diabetic polyneuropathy, without long-term current use of insulin [facility])  Assessment & Plan  Lab Results   Component Value Date    HGBA1C 6.9 (H) 10/18/2019     Hold oral home medications  Sign scale and Accu-Cheks every 6            Medical Problems     Resolved Problems  Date Reviewed: 8/19/2023          Resolved    * (Principal) Abdominal pain 8/20/2023     Resolved by  Kristi De La Garza MD    KADE (acute kidney injury) ([address]) 8/20/2023     Resolved by  Kristi De La Garza MD          Admission Date:   Admission Orders (From admission, onward)     Ordered        08/18/23 1948  Inpatient Admission  Once                        Admitting Diagnosis: Alcohol abuse [F10.10]  Gastritis [K29.70]  Abdominal pain [R10.9]  KADE (acute kidney injury) ([address]) [N17.9]    HPI: 65-years-old male with history of alcohol use disorder, HLD, T2DM not on insulin, HTN, and tobacco use who presented due to abdominal pain, N/V, active heavy alcohol use. No bleeding. Procedures Performed: No orders of the defined types were placed in this encounter. Summary of Hospital Course: Received supportive treatment for his alcohol-induced gastritis. Underwent, CT imaging that revealed inflammation of his gastric and duodenal mucosa reason why he underwent EGD by GI. PUD and gastric neoplasias were ruled out,  Grade A esophagitis, hiatal hernia grade II, and gastritis were confirmed. Received IV PPI, and sucralfate, which he will continue as outpatient. Will follow-up with his PCP within the next month. Significant Findings, Care, Treatment and Services Provided: As above    Complications: None    Condition at Discharge: stable         Discharge instructions/Information to patient and family:   See after visit summary for information provided to patient and family. Provisions for Follow-Up Care:  See after visit summary for information related to follow-up care and any pertinent home health orders. PCP: No primary care provider on file. Disposition: Home    Planned Readmission: No    Discharge Statement   I spent 20 minutes discharging the patient. This time was spent on the day of discharge. I had direct contact with the patient on the day of discharge. Additional documentation is required if more than 30 minutes were spent on discharge. Discharge Medications:  See after visit summary for reconciled discharge medications provided to patient and family.

## 2023-08-20 NOTE — PLAN OF CARE
Problem: MOBILITY - ADULT  Goal: Maintain or return to baseline ADL function  Description: INTERVENTIONS:  -  Assess patient's ability to carry out ADLs; assess patient's baseline for ADL function and identify physical deficits which impact ability to perform ADLs (bathing, care of mouth/teeth, toileting, grooming, dressing, etc.)  - Assess/evaluate cause of self-care deficits   - Assess range of motion  - Assess patient's mobility; develop plan if impaired  - Assess patient's need for assistive devices and provide as appropriate  - Encourage maximum independence but intervene and supervise when necessary  - Involve family in performance of ADLs  - Assess for home care needs following discharge   - Consider OT consult to assist with ADL evaluation and planning for discharge  - Provide patient education as appropriate  Outcome: Progressing  Goal: Maintains/Returns to pre admission functional level  Description: INTERVENTIONS:  - Perform BMAT or MOVE assessment daily.   - Set and communicate daily mobility goal to care team and patient/family/caregiver. - Collaborate with rehabilitation services on mobility goals if consulted  - Perform Range of Motion    times a day. - Reposition patient every    hours.   - Dangle patient    times a day  - Stand patient      times a day  - Ambulate patient    times a day  - Out of bed to chair    times a day   - Out of bed for meals    times a day  - Out of bed for toileting  - Record patient progress and toleration of activity level   Outcome: Progressing     Problem: PAIN - ADULT  Goal: Verbalizes/displays adequate comfort level or baseline comfort level  Description: Interventions:  - Encourage patient to monitor pain and request assistance  - Assess pain using appropriate pain scale  - Administer analgesics based on type and severity of pain and evaluate response  - Implement non-pharmacological measures as appropriate and evaluate response  - Consider cultural and social influences on pain and pain management  - Notify physician/advanced practitioner if interventions unsuccessful or patient reports new pain  Outcome: Progressing     Problem: INFECTION - ADULT  Goal: Absence or prevention of progression during hospitalization  Description: INTERVENTIONS:  - Assess and monitor for signs and symptoms of infection  - Monitor lab/diagnostic results  - Monitor all insertion sites, i.e. indwelling lines, tubes, and drains  - Monitor endotracheal if appropriate and nasal secretions for changes in amount and color  - [place] appropriate cooling/warming therapies per order  - Administer medications as ordered  - Instruct and encourage patient and family to use good hand hygiene technique  - Identify and instruct in appropriate isolation precautions for identified infection/condition  Outcome: Progressing  Goal: Absence of fever/infection during neutropenic period  Description: INTERVENTIONS:  - Monitor WBC    Outcome: Progressing     Problem: SAFETY ADULT  Goal: Maintain or return to baseline ADL function  Description: INTERVENTIONS:  -  Assess patient's ability to carry out ADLs; assess patient's baseline for ADL function and identify physical deficits which impact ability to perform ADLs (bathing, care of mouth/teeth, toileting, grooming, dressing, etc.)  - Assess/evaluate cause of self-care deficits   - Assess range of motion  - Assess patient's mobility; develop plan if impaired  - Assess patient's need for assistive devices and provide as appropriate  - Encourage maximum independence but intervene and supervise when necessary  - Involve family in performance of ADLs  - Assess for home care needs following discharge   - Consider OT consult to assist with ADL evaluation and planning for discharge  - Provide patient education as appropriate  Outcome: Progressing  Goal: Maintains/Returns to pre admission functional level  Description: INTERVENTIONS:  - Perform BMAT or MOVE assessment daily.   - Set and communicate daily mobility goal to care team and patient/family/caregiver. - Collaborate with rehabilitation services on mobility goals if consulted  - Perform Range of Motion    times a day. - Reposition patient every    hours.   - Dangle patient    times a day  - Stand patient    times a day  - Ambulate patient      times a day  - Out of bed to chair    times a day   - Out of bed for meals    times a day  - Out of bed for toileting  - Record patient progress and toleration of activity level   Outcome: Progressing  Goal: Patient will remain free of falls  Description: INTERVENTIONS:  - Educate patient/family on patient safety including physical limitations  - Instruct patient to call for assistance with activity   - Consult OT/PT to assist with strengthening/mobility   - Keep Call bell within reach  - Keep bed low and locked with side rails adjusted as appropriate  - Keep care items and personal belongings within reach  - Initiate and maintain comfort rounds  - Make Fall Risk Sign visible to staff  - Offer Toileting every    Hours, in advance of need  - Initiate/Maintain   alarm  - Obtain necessary fall risk management equipment:   - Apply yellow socks and bracelet for high fall risk patients  - Consider moving patient to room near nurses station  Outcome: Progressing     Problem: DISCHARGE PLANNING  Goal: Discharge to home or other facility with appropriate resources  Description: INTERVENTIONS:  - Identify barriers to discharge w/patient and caregiver  - Arrange for needed discharge resources and transportation as appropriate  - Identify discharge learning needs (meds, wound care, etc.)  - Arrange for interpretive services to assist at discharge as needed  - Refer to Case Management Department for coordinating discharge planning if the patient needs post-hospital services based on physician/advanced practitioner order or complex needs related to functional status, cognitive ability, or social support system  Outcome: Progressing     Problem: Knowledge Deficit  Goal: Patient/family/caregiver demonstrates understanding of disease process, treatment plan, medications, and discharge instructions  Description: Complete learning assessment and assess knowledge base.   Interventions:  - Provide teaching at level of understanding  - Provide teaching via preferred learning methods  Outcome: Progressing

## 2023-08-20 NOTE — ASSESSMENT & PLAN NOTE
Lab Results   Component Value Date    HGBA1C 6.9 (H) 10/18/2019     Hold oral home medications  Sign scale and Accu-Cheks every 6

## 2023-08-20 NOTE — ASSESSMENT & PLAN NOTE
As evidenced in CT of his abdomen. No bleeding. Underwent EGD by GI that confirmed alcohol-induced gastritis.  Continue PPI, and sucralfate

## 2023-08-20 NOTE — ASSESSMENT & PLAN NOTE
Continues to drink alcohol, up to 1 handle of liquor daily, last drink was 08/18  [facility] protocol, multivitamins (FA and thiamine). No current evidence of withdrawal. Not interested in cessation or support at this time. Evident tremors on physical examination.

## 2023-08-24 ENCOUNTER — TELEPHONE (OUTPATIENT)
Dept: GASTROENTEROLOGY | Facility: CLINIC | Age: 78
End: 2023-08-24

## 2023-08-24 PROCEDURE — 88341 IMHCHEM/IMCYTCHM EA ADD ANTB: CPT | Performed by: STUDENT IN AN ORGANIZED HEALTH CARE EDUCATION/TRAINING PROGRAM

## 2023-08-24 PROCEDURE — 88313 SPECIAL STAINS GROUP 2: CPT | Performed by: STUDENT IN AN ORGANIZED HEALTH CARE EDUCATION/TRAINING PROGRAM

## 2023-08-24 PROCEDURE — 88342 IMHCHEM/IMCYTCHM 1ST ANTB: CPT | Performed by: STUDENT IN AN ORGANIZED HEALTH CARE EDUCATION/TRAINING PROGRAM

## 2023-08-24 PROCEDURE — 88305 TISSUE EXAM BY PATHOLOGIST: CPT | Performed by: STUDENT IN AN ORGANIZED HEALTH CARE EDUCATION/TRAINING PROGRAM

## 2023-08-24 NOTE — TELEPHONE ENCOUNTER
Called and spoke to patient. Gave patient test results.  Patient voiced understanding and had no further questions or cocnerns

## 2023-08-24 NOTE — TELEPHONE ENCOUNTER
----- Message from Shaheen Skelton MD sent at 8/24/2023 10:32 AM EDT -----  Please tell him the biopsies of his stomach were normal

## 2023-08-29 ENCOUNTER — HOSPITAL ENCOUNTER (INPATIENT)
Facility: HOSPITAL | Age: 78
LOS: 3 days | Discharge: LEFT BEFORE DISCHARGE | DRG: 339 | End: 2023-09-01
Attending: EMERGENCY MEDICINE | Admitting: SURGERY
Payer: COMMERCIAL

## 2023-08-29 ENCOUNTER — APPOINTMENT (EMERGENCY)
Dept: CT IMAGING | Facility: HOSPITAL | Age: 78
DRG: 339 | End: 2023-08-29
Payer: COMMERCIAL

## 2023-08-29 DIAGNOSIS — E11.42 TYPE 2 DIABETES MELLITUS WITH DIABETIC POLYNEUROPATHY, WITHOUT LONG-TERM CURRENT USE OF INSULIN (HCC): ICD-10-CM

## 2023-08-29 DIAGNOSIS — F32.A DEPRESSION, UNSPECIFIED DEPRESSION TYPE: ICD-10-CM

## 2023-08-29 DIAGNOSIS — I25.10 CORONARY ARTERY DISEASE INVOLVING NATIVE HEART WITHOUT ANGINA PECTORIS, UNSPECIFIED VESSEL OR LESION TYPE: ICD-10-CM

## 2023-08-29 DIAGNOSIS — F10.20 ALCOHOLISM (HCC): ICD-10-CM

## 2023-08-29 DIAGNOSIS — I10 ESSENTIAL HYPERTENSION: ICD-10-CM

## 2023-08-29 DIAGNOSIS — K35.80 ACUTE APPENDICITIS, UNSPECIFIED ACUTE APPENDICITIS TYPE: ICD-10-CM

## 2023-08-29 DIAGNOSIS — K35.80 ACUTE APPENDICITIS: ICD-10-CM

## 2023-08-29 DIAGNOSIS — I25.10 CORONARY ARTERY DISEASE INVOLVING NATIVE CORONARY ARTERY OF NATIVE HEART WITHOUT ANGINA PECTORIS: ICD-10-CM

## 2023-08-29 DIAGNOSIS — E78.2 MIXED HYPERLIPIDEMIA: Primary | ICD-10-CM

## 2023-08-29 LAB
ALBUMIN SERPL BCP-MCNC: 3.6 G/DL (ref 3.5–5)
ALP SERPL-CCNC: 51 U/L (ref 34–104)
ALT SERPL W P-5'-P-CCNC: 14 U/L (ref 7–52)
ANION GAP SERPL CALCULATED.3IONS-SCNC: 10 MMOL/L
AST SERPL W P-5'-P-CCNC: 17 U/L (ref 13–39)
BACTERIA UR QL AUTO: NORMAL /HPF
BASOPHILS # BLD AUTO: 0.01 THOUSANDS/ÂΜL (ref 0–0.1)
BASOPHILS NFR BLD AUTO: 0 % (ref 0–1)
BILIRUB SERPL-MCNC: 0.33 MG/DL (ref 0.2–1)
BILIRUB UR QL STRIP: NEGATIVE
BUN SERPL-MCNC: 23 MG/DL (ref 5–25)
CALCIUM SERPL-MCNC: 8.8 MG/DL (ref 8.4–10.2)
CHLORIDE SERPL-SCNC: 105 MMOL/L (ref 96–108)
CLARITY UR: CLEAR
CO2 SERPL-SCNC: 22 MMOL/L (ref 21–32)
COLOR UR: ABNORMAL
CREAT SERPL-MCNC: 1.42 MG/DL (ref 0.6–1.3)
EOSINOPHIL # BLD AUTO: 0.09 THOUSAND/ÂΜL (ref 0–0.61)
EOSINOPHIL NFR BLD AUTO: 1 % (ref 0–6)
ERYTHROCYTE [DISTWIDTH] IN BLOOD BY AUTOMATED COUNT: 15.7 % (ref 11.6–15.1)
GFR SERPL CREATININE-BSD FRML MDRD: 47 ML/MIN/1.73SQ M
GLUCOSE SERPL-MCNC: 200 MG/DL (ref 65–140)
GLUCOSE UR STRIP-MCNC: ABNORMAL MG/DL
HCT VFR BLD AUTO: 30.4 % (ref 36.5–49.3)
HGB BLD-MCNC: 10.3 G/DL (ref 12–17)
HGB UR QL STRIP.AUTO: NEGATIVE
IMM GRANULOCYTES # BLD AUTO: 0.07 THOUSAND/UL (ref 0–0.2)
IMM GRANULOCYTES NFR BLD AUTO: 1 % (ref 0–2)
KETONES UR STRIP-MCNC: NEGATIVE MG/DL
LEUKOCYTE ESTERASE UR QL STRIP: ABNORMAL
LIPASE SERPL-CCNC: 17 U/L (ref 11–82)
LYMPHOCYTES # BLD AUTO: 1.48 THOUSANDS/ÂΜL (ref 0.6–4.47)
LYMPHOCYTES NFR BLD AUTO: 17 % (ref 14–44)
MCH RBC QN AUTO: 31.6 PG (ref 26.8–34.3)
MCHC RBC AUTO-ENTMCNC: 33.9 G/DL (ref 31.4–37.4)
MCV RBC AUTO: 93 FL (ref 82–98)
MONOCYTES # BLD AUTO: 0.53 THOUSAND/ÂΜL (ref 0.17–1.22)
MONOCYTES NFR BLD AUTO: 6 % (ref 4–12)
NEUTROPHILS # BLD AUTO: 6.62 THOUSANDS/ÂΜL (ref 1.85–7.62)
NEUTS SEG NFR BLD AUTO: 75 % (ref 43–75)
NITRITE UR QL STRIP: NEGATIVE
NON-SQ EPI CELLS URNS QL MICRO: NORMAL /HPF
NRBC BLD AUTO-RTO: 0 /100 WBCS
PH UR STRIP.AUTO: 6.5 [PH]
PLATELET # BLD AUTO: 352 THOUSANDS/UL (ref 149–390)
PMV BLD AUTO: 9.5 FL (ref 8.9–12.7)
POTASSIUM SERPL-SCNC: 3.7 MMOL/L (ref 3.5–5.3)
PROT SERPL-MCNC: 6.8 G/DL (ref 6.4–8.4)
PROT UR STRIP-MCNC: NEGATIVE MG/DL
RBC # BLD AUTO: 3.26 MILLION/UL (ref 3.88–5.62)
RBC #/AREA URNS AUTO: NORMAL /HPF
SODIUM SERPL-SCNC: 137 MMOL/L (ref 135–147)
SP GR UR STRIP.AUTO: 1.02 (ref 1–1.03)
UROBILINOGEN UR STRIP-ACNC: <2 MG/DL
WBC # BLD AUTO: 8.8 THOUSAND/UL (ref 4.31–10.16)
WBC #/AREA URNS AUTO: NORMAL /HPF

## 2023-08-29 PROCEDURE — 74176 CT ABD & PELVIS W/O CONTRAST: CPT

## 2023-08-29 PROCEDURE — 81001 URINALYSIS AUTO W/SCOPE: CPT | Performed by: EMERGENCY MEDICINE

## 2023-08-29 PROCEDURE — 83690 ASSAY OF LIPASE: CPT | Performed by: EMERGENCY MEDICINE

## 2023-08-29 PROCEDURE — G1004 CDSM NDSC: HCPCS

## 2023-08-29 PROCEDURE — 99285 EMERGENCY DEPT VISIT HI MDM: CPT

## 2023-08-29 PROCEDURE — 99285 EMERGENCY DEPT VISIT HI MDM: CPT | Performed by: EMERGENCY MEDICINE

## 2023-08-29 PROCEDURE — 36415 COLL VENOUS BLD VENIPUNCTURE: CPT | Performed by: EMERGENCY MEDICINE

## 2023-08-29 PROCEDURE — 85025 COMPLETE CBC W/AUTO DIFF WBC: CPT | Performed by: EMERGENCY MEDICINE

## 2023-08-29 PROCEDURE — 80053 COMPREHEN METABOLIC PANEL: CPT | Performed by: EMERGENCY MEDICINE

## 2023-08-29 RX ORDER — HEPARIN SODIUM 5000 [USP'U]/ML
5000 INJECTION, SOLUTION INTRAVENOUS; SUBCUTANEOUS EVERY 8 HOURS SCHEDULED
Status: DISCONTINUED | OUTPATIENT
Start: 2023-08-29 | End: 2023-08-30

## 2023-08-29 RX ORDER — DEXTROSE MONOHYDRATE, SODIUM CHLORIDE, AND POTASSIUM CHLORIDE 50; 1.49; 4.5 G/1000ML; G/1000ML; G/1000ML
125 INJECTION, SOLUTION INTRAVENOUS CONTINUOUS
Status: DISCONTINUED | OUTPATIENT
Start: 2023-08-29 | End: 2023-09-01

## 2023-08-29 RX ORDER — ONDANSETRON 2 MG/ML
4 INJECTION INTRAMUSCULAR; INTRAVENOUS EVERY 6 HOURS PRN
Status: DISCONTINUED | OUTPATIENT
Start: 2023-08-29 | End: 2023-09-01 | Stop reason: HOSPADM

## 2023-08-29 RX ORDER — PANTOPRAZOLE SODIUM 40 MG/10ML
40 INJECTION, POWDER, LYOPHILIZED, FOR SOLUTION INTRAVENOUS
Status: DISCONTINUED | OUTPATIENT
Start: 2023-08-30 | End: 2023-09-01 | Stop reason: HOSPADM

## 2023-08-29 RX ORDER — DOCUSATE SODIUM 100 MG/1
100 CAPSULE, LIQUID FILLED ORAL 2 TIMES DAILY
Status: DISCONTINUED | OUTPATIENT
Start: 2023-08-30 | End: 2023-09-01 | Stop reason: HOSPADM

## 2023-08-29 RX ADMIN — PIPERACILLIN AND TAZOBACTAM 2.25 G: 2; .25 INJECTION, POWDER, LYOPHILIZED, FOR SOLUTION INTRAVENOUS at 23:42

## 2023-08-30 ENCOUNTER — ANESTHESIA (INPATIENT)
Dept: PERIOP | Facility: HOSPITAL | Age: 78
DRG: 339 | End: 2023-08-30
Payer: COMMERCIAL

## 2023-08-30 ENCOUNTER — ANESTHESIA EVENT (INPATIENT)
Dept: PERIOP | Facility: HOSPITAL | Age: 78
DRG: 339 | End: 2023-08-30
Payer: COMMERCIAL

## 2023-08-30 PROBLEM — K35.80 ACUTE APPENDICITIS: Status: ACTIVE | Noted: 2023-08-30

## 2023-08-30 LAB
ALBUMIN SERPL BCP-MCNC: 3.3 G/DL (ref 3.5–5)
ALP SERPL-CCNC: 43 U/L (ref 34–104)
ALT SERPL W P-5'-P-CCNC: 12 U/L (ref 7–52)
ANION GAP SERPL CALCULATED.3IONS-SCNC: 5 MMOL/L
AST SERPL W P-5'-P-CCNC: 14 U/L (ref 13–39)
BASOPHILS # BLD AUTO: 0.02 THOUSANDS/ÂΜL (ref 0–0.1)
BASOPHILS NFR BLD AUTO: 0 % (ref 0–1)
BILIRUB SERPL-MCNC: 0.4 MG/DL (ref 0.2–1)
BUN SERPL-MCNC: 21 MG/DL (ref 5–25)
CALCIUM ALBUM COR SERPL-MCNC: 9.2 MG/DL (ref 8.3–10.1)
CALCIUM SERPL-MCNC: 8.6 MG/DL (ref 8.4–10.2)
CHLORIDE SERPL-SCNC: 108 MMOL/L (ref 96–108)
CO2 SERPL-SCNC: 25 MMOL/L (ref 21–32)
CREAT SERPL-MCNC: 1.33 MG/DL (ref 0.6–1.3)
EOSINOPHIL # BLD AUTO: 0.07 THOUSAND/ÂΜL (ref 0–0.61)
EOSINOPHIL NFR BLD AUTO: 1 % (ref 0–6)
ERYTHROCYTE [DISTWIDTH] IN BLOOD BY AUTOMATED COUNT: 15.5 % (ref 11.6–15.1)
GFR SERPL CREATININE-BSD FRML MDRD: 51 ML/MIN/1.73SQ M
GLUCOSE SERPL-MCNC: 165 MG/DL (ref 65–140)
GLUCOSE SERPL-MCNC: 185 MG/DL (ref 65–140)
HCT VFR BLD AUTO: 28.2 % (ref 36.5–49.3)
HGB BLD-MCNC: 9.6 G/DL (ref 12–17)
IMM GRANULOCYTES # BLD AUTO: 0.02 THOUSAND/UL (ref 0–0.2)
IMM GRANULOCYTES NFR BLD AUTO: 0 % (ref 0–2)
LYMPHOCYTES # BLD AUTO: 1.37 THOUSANDS/ÂΜL (ref 0.6–4.47)
LYMPHOCYTES NFR BLD AUTO: 20 % (ref 14–44)
MAGNESIUM SERPL-MCNC: 1.4 MG/DL (ref 1.9–2.7)
MCH RBC QN AUTO: 31.4 PG (ref 26.8–34.3)
MCHC RBC AUTO-ENTMCNC: 34 G/DL (ref 31.4–37.4)
MCV RBC AUTO: 92 FL (ref 82–98)
MONOCYTES # BLD AUTO: 0.48 THOUSAND/ÂΜL (ref 0.17–1.22)
MONOCYTES NFR BLD AUTO: 7 % (ref 4–12)
NEUTROPHILS # BLD AUTO: 5 THOUSANDS/ÂΜL (ref 1.85–7.62)
NEUTS SEG NFR BLD AUTO: 72 % (ref 43–75)
NRBC BLD AUTO-RTO: 0 /100 WBCS
PHOSPHATE SERPL-MCNC: 3.5 MG/DL (ref 2.3–4.1)
PLATELET # BLD AUTO: 309 THOUSANDS/UL (ref 149–390)
PMV BLD AUTO: 9.3 FL (ref 8.9–12.7)
POTASSIUM SERPL-SCNC: 3.9 MMOL/L (ref 3.5–5.3)
PROT SERPL-MCNC: 6.1 G/DL (ref 6.4–8.4)
RBC # BLD AUTO: 3.06 MILLION/UL (ref 3.88–5.62)
SODIUM SERPL-SCNC: 138 MMOL/L (ref 135–147)
WBC # BLD AUTO: 6.96 THOUSAND/UL (ref 4.31–10.16)

## 2023-08-30 PROCEDURE — 83735 ASSAY OF MAGNESIUM: CPT | Performed by: SURGERY

## 2023-08-30 PROCEDURE — 84100 ASSAY OF PHOSPHORUS: CPT | Performed by: SURGERY

## 2023-08-30 PROCEDURE — 99222 1ST HOSP IP/OBS MODERATE 55: CPT | Performed by: INTERNAL MEDICINE

## 2023-08-30 PROCEDURE — 80053 COMPREHEN METABOLIC PANEL: CPT | Performed by: SURGERY

## 2023-08-30 PROCEDURE — 44970 LAPAROSCOPY APPENDECTOMY: CPT | Performed by: STUDENT IN AN ORGANIZED HEALTH CARE EDUCATION/TRAINING PROGRAM

## 2023-08-30 PROCEDURE — 82948 REAGENT STRIP/BLOOD GLUCOSE: CPT

## 2023-08-30 PROCEDURE — 99223 1ST HOSP IP/OBS HIGH 75: CPT | Performed by: INTERNAL MEDICINE

## 2023-08-30 PROCEDURE — 88304 TISSUE EXAM BY PATHOLOGIST: CPT | Performed by: STUDENT IN AN ORGANIZED HEALTH CARE EDUCATION/TRAINING PROGRAM

## 2023-08-30 PROCEDURE — 99223 1ST HOSP IP/OBS HIGH 75: CPT | Performed by: STUDENT IN AN ORGANIZED HEALTH CARE EDUCATION/TRAINING PROGRAM

## 2023-08-30 PROCEDURE — 0DTJ4ZZ RESECTION OF APPENDIX, PERCUTANEOUS ENDOSCOPIC APPROACH: ICD-10-PCS | Performed by: STUDENT IN AN ORGANIZED HEALTH CARE EDUCATION/TRAINING PROGRAM

## 2023-08-30 PROCEDURE — 85025 COMPLETE CBC W/AUTO DIFF WBC: CPT | Performed by: SURGERY

## 2023-08-30 PROCEDURE — C9113 INJ PANTOPRAZOLE SODIUM, VIA: HCPCS | Performed by: SURGERY

## 2023-08-30 PROCEDURE — 44970 LAPAROSCOPY APPENDECTOMY: CPT | Performed by: PHYSICIAN ASSISTANT

## 2023-08-30 RX ORDER — MAGNESIUM HYDROXIDE 1200 MG/15ML
LIQUID ORAL AS NEEDED
Status: DISCONTINUED | OUTPATIENT
Start: 2023-08-30 | End: 2023-08-30 | Stop reason: HOSPADM

## 2023-08-30 RX ORDER — BUPIVACAINE HYDROCHLORIDE 2.5 MG/ML
INJECTION, SOLUTION EPIDURAL; INFILTRATION; INTRACAUDAL AS NEEDED
Status: DISCONTINUED | OUTPATIENT
Start: 2023-08-30 | End: 2023-08-30 | Stop reason: HOSPADM

## 2023-08-30 RX ORDER — LIDOCAINE HYDROCHLORIDE 20 MG/ML
INJECTION, SOLUTION EPIDURAL; INFILTRATION; INTRACAUDAL; PERINEURAL AS NEEDED
Status: DISCONTINUED | OUTPATIENT
Start: 2023-08-30 | End: 2023-08-30

## 2023-08-30 RX ORDER — OXYCODONE HYDROCHLORIDE 5 MG/1
5 TABLET ORAL EVERY 6 HOURS PRN
Status: DISCONTINUED | OUTPATIENT
Start: 2023-08-30 | End: 2023-08-31

## 2023-08-30 RX ORDER — METOCLOPRAMIDE HYDROCHLORIDE 5 MG/ML
10 INJECTION INTRAMUSCULAR; INTRAVENOUS ONCE AS NEEDED
Status: DISCONTINUED | OUTPATIENT
Start: 2023-08-30 | End: 2023-08-30 | Stop reason: HOSPADM

## 2023-08-30 RX ORDER — ONDANSETRON 2 MG/ML
INJECTION INTRAMUSCULAR; INTRAVENOUS AS NEEDED
Status: DISCONTINUED | OUTPATIENT
Start: 2023-08-30 | End: 2023-08-30

## 2023-08-30 RX ORDER — INSULIN LISPRO 100 [IU]/ML
1-5 INJECTION, SOLUTION INTRAVENOUS; SUBCUTANEOUS
Status: DISCONTINUED | OUTPATIENT
Start: 2023-08-30 | End: 2023-09-01 | Stop reason: HOSPADM

## 2023-08-30 RX ORDER — FENTANYL CITRATE 50 UG/ML
INJECTION, SOLUTION INTRAMUSCULAR; INTRAVENOUS AS NEEDED
Status: DISCONTINUED | OUTPATIENT
Start: 2023-08-30 | End: 2023-08-30

## 2023-08-30 RX ORDER — ONDANSETRON 2 MG/ML
4 INJECTION INTRAMUSCULAR; INTRAVENOUS ONCE AS NEEDED
Status: DISCONTINUED | OUTPATIENT
Start: 2023-08-30 | End: 2023-08-30 | Stop reason: HOSPADM

## 2023-08-30 RX ORDER — PROPOFOL 10 MG/ML
INJECTION, EMULSION INTRAVENOUS AS NEEDED
Status: DISCONTINUED | OUTPATIENT
Start: 2023-08-30 | End: 2023-08-30

## 2023-08-30 RX ORDER — EPHEDRINE SULFATE 50 MG/ML
INJECTION INTRAVENOUS AS NEEDED
Status: DISCONTINUED | OUTPATIENT
Start: 2023-08-30 | End: 2023-08-30

## 2023-08-30 RX ORDER — METRONIDAZOLE 500 MG/100ML
500 INJECTION, SOLUTION INTRAVENOUS EVERY 8 HOURS
Status: DISCONTINUED | OUTPATIENT
Start: 2023-08-30 | End: 2023-08-30

## 2023-08-30 RX ORDER — FENTANYL CITRATE/PF 50 MCG/ML
25 SYRINGE (ML) INJECTION
Status: DISCONTINUED | OUTPATIENT
Start: 2023-08-30 | End: 2023-08-30 | Stop reason: HOSPADM

## 2023-08-30 RX ORDER — ROCURONIUM BROMIDE 10 MG/ML
INJECTION, SOLUTION INTRAVENOUS AS NEEDED
Status: DISCONTINUED | OUTPATIENT
Start: 2023-08-30 | End: 2023-08-30

## 2023-08-30 RX ORDER — CEFAZOLIN SODIUM 1 G/50ML
1000 SOLUTION INTRAVENOUS ONCE
Status: COMPLETED | OUTPATIENT
Start: 2023-08-30 | End: 2023-08-30

## 2023-08-30 RX ORDER — HYDROMORPHONE HCL/PF 1 MG/ML
0.5 SYRINGE (ML) INJECTION
Status: DISCONTINUED | OUTPATIENT
Start: 2023-08-30 | End: 2023-08-30 | Stop reason: HOSPADM

## 2023-08-30 RX ORDER — INSULIN LISPRO 100 [IU]/ML
1-5 INJECTION, SOLUTION INTRAVENOUS; SUBCUTANEOUS
Status: DISCONTINUED | OUTPATIENT
Start: 2023-08-31 | End: 2023-09-01 | Stop reason: HOSPADM

## 2023-08-30 RX ADMIN — DOCUSATE SODIUM 100 MG: 100 CAPSULE, LIQUID FILLED ORAL at 17:23

## 2023-08-30 RX ADMIN — ROCURONIUM BROMIDE 40 MG: 10 INJECTION, SOLUTION INTRAVENOUS at 13:03

## 2023-08-30 RX ADMIN — PROPOFOL 50 MG: 10 INJECTION, EMULSION INTRAVENOUS at 13:04

## 2023-08-30 RX ADMIN — ROCURONIUM BROMIDE 10 MG: 10 INJECTION, SOLUTION INTRAVENOUS at 13:58

## 2023-08-30 RX ADMIN — ONDANSETRON 4 MG: 2 INJECTION INTRAMUSCULAR; INTRAVENOUS at 14:47

## 2023-08-30 RX ADMIN — CEFAZOLIN SODIUM 1000 MG: 1 SOLUTION INTRAVENOUS at 12:53

## 2023-08-30 RX ADMIN — MORPHINE SULFATE 2 MG: 2 INJECTION, SOLUTION INTRAMUSCULAR; INTRAVENOUS at 19:16

## 2023-08-30 RX ADMIN — DEXTROSE, SODIUM CHLORIDE, AND POTASSIUM CHLORIDE 125 ML/HR: 5; .45; .15 INJECTION INTRAVENOUS at 09:34

## 2023-08-30 RX ADMIN — PANTOPRAZOLE SODIUM 40 MG: 40 INJECTION, POWDER, FOR SOLUTION INTRAVENOUS at 09:34

## 2023-08-30 RX ADMIN — PIPERACILLIN AND TAZOBACTAM 2.25 G: 2; .25 INJECTION, POWDER, LYOPHILIZED, FOR SOLUTION INTRAVENOUS at 06:00

## 2023-08-30 RX ADMIN — EPHEDRINE SULFATE 5 MG: 50 INJECTION, SOLUTION INTRAVENOUS at 13:15

## 2023-08-30 RX ADMIN — SUGAMMADEX 200 MG: 100 INJECTION, SOLUTION INTRAVENOUS at 15:30

## 2023-08-30 RX ADMIN — EPHEDRINE SULFATE 10 MG: 50 INJECTION, SOLUTION INTRAVENOUS at 13:19

## 2023-08-30 RX ADMIN — FENTANYL CITRATE 50 MCG: 50 INJECTION INTRAMUSCULAR; INTRAVENOUS at 14:17

## 2023-08-30 RX ADMIN — ROCURONIUM BROMIDE 10 MG: 10 INJECTION, SOLUTION INTRAVENOUS at 13:02

## 2023-08-30 RX ADMIN — METRONIDAZOLE: 500 INJECTION, SOLUTION INTRAVENOUS at 15:22

## 2023-08-30 RX ADMIN — HEPARIN SODIUM 5000 UNITS: 5000 INJECTION INTRAVENOUS; SUBCUTANEOUS at 00:34

## 2023-08-30 RX ADMIN — PROPOFOL 150 MG: 10 INJECTION, EMULSION INTRAVENOUS at 13:03

## 2023-08-30 RX ADMIN — PIPERACILLIN AND TAZOBACTAM 2.25 G: 2; .25 INJECTION, POWDER, LYOPHILIZED, FOR SOLUTION INTRAVENOUS at 19:20

## 2023-08-30 RX ADMIN — MORPHINE SULFATE 2 MG: 2 INJECTION, SOLUTION INTRAMUSCULAR; INTRAVENOUS at 21:21

## 2023-08-30 RX ADMIN — METRONIDAZOLE: 500 INJECTION, SOLUTION INTRAVENOUS at 12:56

## 2023-08-30 RX ADMIN — FENTANYL CITRATE 50 MCG: 50 INJECTION INTRAMUSCULAR; INTRAVENOUS at 14:46

## 2023-08-30 RX ADMIN — OXYCODONE HYDROCHLORIDE 5 MG: 5 TABLET ORAL at 17:23

## 2023-08-30 RX ADMIN — FENTANYL CITRATE 50 MCG: 50 INJECTION INTRAMUSCULAR; INTRAVENOUS at 13:01

## 2023-08-30 RX ADMIN — HEPARIN SODIUM 5000 UNITS: 5000 INJECTION INTRAVENOUS; SUBCUTANEOUS at 06:00

## 2023-08-30 RX ADMIN — LIDOCAINE HYDROCHLORIDE 100 MG: 20 INJECTION, SOLUTION EPIDURAL; INFILTRATION; INTRACAUDAL at 13:02

## 2023-08-30 RX ADMIN — PIPERACILLIN AND TAZOBACTAM 2.25 G: 2; .25 INJECTION, POWDER, LYOPHILIZED, FOR SOLUTION INTRAVENOUS at 11:21

## 2023-08-30 RX ADMIN — DEXTROSE, SODIUM CHLORIDE, AND POTASSIUM CHLORIDE 125 ML/HR: 5; .45; .15 INJECTION INTRAVENOUS at 00:34

## 2023-08-30 RX ADMIN — FENTANYL CITRATE 25 MCG: 50 INJECTION INTRAMUSCULAR; INTRAVENOUS at 15:51

## 2023-08-30 RX ADMIN — FENTANYL CITRATE 25 MCG: 50 INJECTION INTRAMUSCULAR; INTRAVENOUS at 15:33

## 2023-08-30 NOTE — CASE MANAGEMENT
Case Management Assessment & Discharge Planning Note    Patient name Lilo Wick  Location /-31 MRN [id number]  : 1945 Date 2023       Current Admission Date: 2023  Current Admission Diagnosis:Acute appendicitis   Patient Active Problem List    Diagnosis Date Noted   • Acute appendicitis 2023   • Alcohol abuse 2023   • Gastritis 2023   • Smoking 2023   • Altered mental status    • Toxic metabolic encephalopathy    • Lumbosacral spinal stenosis 11/10/2014   • Other cirrhosis of liver ([facility]) 03/15/2013   • Chronic hepatitis C ([facility]) 03/15/2013   • Essential hypertension 2013   • Alcoholism ([facility]) 2012   • Depression 2012   • Arthritis 2012   • Type 2 diabetes mellitus with diabetic polyneuropathy, without long-term current use of insulin ([facility]) 05/10/2012   • Coronary artery disease involving native coronary artery of native heart without angina pectoris 05/10/2012   • Hyperlipidemia 05/10/2012   • Peripheral vascular disease ([facility]) 05/10/2012      LOS (days): 1  Geometric Mean LOS (GMLOS) (days): 2.10  Days to GMLOS:1.6     OBJECTIVE:  PATIENT READMITTED TO HOSPITAL  Risk of Unplanned Readmission Score: 16.69         Current admission status: Inpatient       Preferred Pharmacy:   [address] [place], [place] [id number] [address] [address]  Phone: [phone] Fax: [phone]    [address] [address]  Phone: [phone] Fax: [phone]    Primary Care Provider: No primary care provider on file. Primary Insurance: [facility]  Secondary Insurance:     ASSESSMENT:  [place] Proxies    There are no active Health Care Proxies on file.        Advance Directives  Does patient have a Health Care POA?: No  Was patient offered paperwork?: Yes (DECLINED)  Does patient currently have a Health Care decision maker?: Yes, please see Health Care Proxy section  Does patient have Advance Directives?: No  Was patient offered paperwork?: Yes (DECLINED-)  Primary Contact: Charlotte Fraga (Daughter)   [phone] (H)   [phone]         Readmission Root Cause  30 Day Readmission: No    Patient Information  Admitted from[de-identified] Home  Mental Status: Alert  During Assessment patient was accompanied by: Not accompanied during assessment  Assessment information provided by[de-identified] Patient  Primary Caregiver: Self  Support Systems: Daughter, Self, Family members  [facility]: [address] [place] [address] do you live in?: [place] (Patient stated that they live with exwife.)  Home entry access options.  Select all that apply.: Stairs  Number of steps to enter home.: 1  Type of Current Residence: [place]  In the last 12 months, was there a time when you were not able to pay the mortgage or rent on time?: No  In the last 12 months, how many places have you lived?: 2  In the last 12 months, was there a time when you did not have a steady place to sleep or slept in a shelter (including now)?: No  Homeless/housing insecurity resource given?: N/A  Living Arrangements: Other (Comment) (EXWIFE)  Is patient a ?: No    Activities of Daily Living Prior to Admission  Functional Status: Independent  Completes ADLs independently?: Yes  Ambulates independently?: Yes  Does patient use assisted devices?: No  Does patient currently own DME?: No  Does patient have a history of Outpatient Therapy (PT/OT)?: No  Does the patient have a history of Short-Term Rehab?: No  Does patient have a history of HHC?: No  Does patient currently have [address]?: No         Patient Information Continued  Income Source: SSI/SSD  Does patient have prescription coverage?: Yes  Within the past 12 months, you worried that your food would run out before you got the money to buy more.: Never true  Within the past 12 months, the food you bought just didn't last and you didn't have money to get more.: Never true  Food insecurity resource given?: N/A  Does patient receive dialysis treatments?: No  Does patient have a history of substance abuse?: No  Does patient have a history of Mental Health Diagnosis?: No (Patient stated no but chart says history of depression.)    PHQ 2/9 Screening   Reviewed PHQ 2/9 Depression Screening Score?: No    Means of Transportation  Means of Transport to Appts[de-identified] Family transport  In the past 12 months, has lack of transportation kept you from medical appointments or from getting medications?: No  In the past 12 months, has lack of transportation kept you from meetings, work, or from getting things needed for daily living?: No  Was application for public transport provided?: N/A        DISCHARGE DETAILS:    Discharge planning discussed with[de-identified] Patient at bedside  Freedom of Choice: Yes  Comments - Freedom of Choice: CM discussed freedom of choice as it pertains to discharge planning. Patient denied having any needs and denied any food or financial resources.   CM contacted family/caregiver?: No- see comments (declined)  Were Treatment Team discharge recommendations reviewed with patient/caregiver?: Yes  Did patient/caregiver verbalize understanding of patient care needs?: Yes  Were patient/caregiver advised of the risks associated with not following Treatment Team discharge recommendations?: Yes         Requested [address] [facility]         Is the patient interested in [address] [place] [address] [place] at discharge?: No    DME Referral Provided  Referral made for DME?: No    Other Referral/Resources/Interventions Provided:  Interventions: None Indicated    Would you like to participate in our [address] service program?  : No - Declined    Treatment Team Recommendation: Home  Discharge Destination Plan[de-identified] Home  Transport at Discharge : Family

## 2023-08-30 NOTE — H&P
GENERAL SURGERY HISTORY AND PHYSICAL    Tiffanie Sanderson 68 y.o. male MRN: [id number]  Unit/Bed#: -02 Encounter: [id number]      Assessment/Plan   67y M w Abnormal finding of appendix concerning for acute appendicitis  - AVSS, no leukocytosis  CAD on ASA 81mg, and Plavix  HTN  HLD  Cirrhosis of the liver  Type 2 Diabetes Mellitus  Depression    Recently admitted for alcoholic induced gastritis. Had presented with abdominal pain, nausea and vomiting. EGD showed gastritis. Patient symptoms improved and he was discharged. Presented to the emergency department with call with concern for acute appendicitis not initially read on initial CT scan. Patient reports not drinking since discharge. Denies any abdominal pain. Last dose of Plavix yesterday  Afebrile vital signs stable, no leukocytosis    Seen by cardiology and deemed moderate risk for surgical intervention    Plan  Plan for OR today for laparoscopic appendectomy, possible open. This was discussed with the patient who is agreeable with the plan. Informed consent will be obtained by the surgeon  NPO/IVF for OR today, ok for diet post-operatively   Cardiology consult for surgical risk stratification for appendectomy  SLIM consult for medical management  IV Antibiotics for microbial coverage  Trend labs, monitor WBC  Monitor vitals  Pain control PRN  Serial abdominal exams  IS post-operatively  Ambulation/OOB post-operatively  DVT prophylaxis       ______________________________________________________________________  Chief Complaint: Normal CT scan    HPI: Tiffanie Sanderson is a 68y.o. year old male with PMHx of alcoholism, CAD on Plavix and ASA, type 2 diabetes mellitus, and depression. He presented to the emergency department yesterday after being called with a read from a previous CT. He was told that it there was concern for acute appendicitis on his CAT scan from his previous admission 2 weeks ago.   Patient at that time was admitted with abdominal pain, nausea and vomiting. 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 Patient reports drinking large amounts daily in the past.  He denies any current alcohol intake since his discharge on 8/20/2023. He denies any abdominal pain. He denies any fevers or chills at home. He has been eating without difficulty and denies any nausea or vomiting. He denies any further abdominal pain. He denies any shortness of breath or chest pain. He denies any previous abdominal surgeries. Review of Systems   Constitutional: Negative for activity change, appetite change, chills and fever. HENT: Negative. Negative for congestion, postnasal drip and sore throat. Eyes: Negative. Respiratory: Negative. Negative for cough and shortness of breath. Cardiovascular: Negative. Negative for chest pain and palpitations. Gastrointestinal: Negative. Negative for abdominal pain, constipation, diarrhea, nausea and vomiting. Endocrine: Negative. Genitourinary: Negative. Negative for difficulty urinating, dysuria and frequency. Musculoskeletal: Negative. Skin: Negative for color change and wound. Allergic/Immunologic: Negative. Neurological: Negative. Negative for dizziness, light-headedness and headaches. Hematological: Negative. Psychiatric/Behavioral: Negative. All other systems reviewed and are negative. Meds/Allergies   No Known Allergies  all current active meds have been reviewed    Historical Information   Past Medical History:   Diagnosis Date   • Chest pain    • Chronic hepatitis C ([facility]) 3/15/2013   • Diabetes mellitus ([facility])    • Heartburn    • Hepatitis C    • Indigestion    • Liver disease    • Myocardial infarction ([facility])    • Seizures ([facility])      History reviewed. No pertinent surgical history.   Social History   Social History     Substance and Sexual Activity   Alcohol Use Yes   • Alcohol/week: 21.0 standard drinks of alcohol   • Types: 21 Cans of beer per week     Social History     Substance and Sexual Activity   Drug Use Not Currently   • Types: Marijuana     Social History     Tobacco Use   Smoking Status Every Day   • Packs/day: 0.50   • Types: Cigarettes   Smokeless Tobacco Never       Family History:  Negative/unremarkable except as detailed in HPI. Objective   Vitals: /61   Pulse 64   Temp 98 °F (36.7 °C)   Resp 20   Wt 66 kg (145 lb 8.1 oz)   SpO2 97%   BMI 24.21 kg/m² ,Body mass index is 24.21 kg/m². Intake/Output Summary (Last 24 hours) at 8/30/2023 0843  Last data filed at 8/30/2023 0212  Gross per 24 hour   Intake 50 ml   Output --   Net 50 ml     Invasive Devices     Peripheral Intravenous Line  Duration           Peripheral IV 08/29/23 Left Antecubital <1 day                Lab Results:   CBC with diff:   Lab Results   Component Value Date    WBC 6.96 08/30/2023    HGB 9.6 (L) 08/30/2023    HCT 28.2 (L) 08/30/2023    MCV 92 08/30/2023     08/30/2023    RBC 3.06 (L) 08/30/2023    MCH 31.4 08/30/2023    MCHC 34.0 08/30/2023    RDW 15.5 (H) 08/30/2023    MPV 9.3 08/30/2023    NRBC 0 08/30/2023   , BMP/CMP:   Lab Results   Component Value Date    SODIUM 138 08/30/2023    K 3.9 08/30/2023     08/30/2023    CO2 25 08/30/2023    BUN 21 08/30/2023    CREATININE 1.33 (H) 08/30/2023    CALCIUM 8.6 08/30/2023    AST 14 08/30/2023    ALT 12 08/30/2023    ALKPHOS 43 08/30/2023    EGFR 51 08/30/2023   , Coags: No results found for: "PT", "PTT", "INR", Urinalysis:   Lab Results   Component Value Date    COLORU Light Yellow 08/29/2023    CLARITYU Clear 08/29/2023    SPECGRAV 1.023 08/29/2023    PHUR 6.5 08/29/2023    LEUKOCYTESUR (A) 08/29/2023     Elevated glucose may cause decreased leukocyte values.  See urine microscopic for UWBC result    NITRITE Negative 08/29/2023    GLUCOSEU >=1000 (1%) (A) 08/29/2023    KETONESU Negative 08/29/2023    BILIRUBINUR Negative 08/29/2023    BLOODU Negative 08/29/2023       Physical Exam  Vitals: /61   Pulse 64   Temp 98 °F (36.7 °C)   Resp 20   Wt 66 kg (145 lb 8.1 oz)   SpO2 97%   BMI 24.21 kg/m² ,Body mass index is 24.21 kg/m². General appearance: AAO x3, no distress, appears stated age, cooperative  HEENT: PERRL, sclera clear, anicterus, oral mucosa is pink  Neck: No carotid bruits, trachea is midline    Back: no tenderness,deformity  Lungs:clear throughout, no wheezes or rhonchi   heart[de-identified] RRR, S1, S2 normal, no murmur  Abdomen: Active bowel sounds, abdomen soft, nontender, nondistended, no masses, no rigidity  Extremities: No edema, pulses palpable, no tenderness  Skin: Warm, dry, no jaundice  Neurologic: CN II-XII grossly intact, no tremor, affect appropriate    Imaging Studies: CT abdomen pelvis wo contrast    Result Date: 8/29/2023  Impression: Acute appendicitis. No evidence of perforation or abscess within limitations of noncontrast exam. Surgical consult advised. Above findings discussed with Dr. Janes Olivas at 10:15 p.m. on 8/29/2023. Workstation performed: [id number]     EKG, Pathology, and Other Studies: I have personally reviewed pertinent reports.     VTE Prophylaxis: Sequential compression device (Venodyne)  and Heparin     Code Status: Level 1 - Full Code    Milvia Hayward PA-C  8/30/2023

## 2023-08-30 NOTE — ED PROVIDER NOTES
History  Chief Complaint   Patient presents with   • Evaluation of Abnormal Diagnostic Test     Pt arrives ambulatory after being told to return to hospital by radiology due to misread of CT scan. Pt denies pain, N/V, and fevers      69 y/o male presents to the ED for abnormal ct scan. Patient was seen here 8/18 for abd pain and alcohol withdrawal. States that he had a ct scan done at that time that showed gastroenteritis vs PUD. He was admitted and GI saw him, performed EGD, and was discharged 2 days later. He states that he has not drank alcohol since and abd pain has resolved. Today, he was called by radiology and told to come back for abnormal ct scan from 8/18. Patient has no complaints at this time. Denies any current pain or symptoms. History provided by:  Patient  Evaluation of Abnormal Diagnostic Test  Patient referred by:  Specialist  Resulting agency:  [facility]  Result type: radiology    Radiology:     Abdominal CT scan: abnormal        Prior to Admission Medications   Prescriptions Last Dose Informant Patient Reported? Taking?    Blood Pressure Monitor MARIELLA Unknown Self No No   Sig: by Does not apply route daily   Lancets (FREESTYLE) lancets  Self No No   Sig: Use as instructed   aspirin 81 mg chewable tablet 8/29/2023 Self Yes Yes   Sig: Chew 81 mg daily   atorvastatin (LIPITOR) 20 mg tablet 8/29/2023 Self No Yes   Sig: Take 1 tablet (20 mg total) by mouth daily   clopidogrel (PLAVIX) 75 mg tablet 8/29/2023 Self No Yes   Sig: Take 1 tablet (75 mg total) by mouth daily   ferrous sulfate 325 (65 Fe) mg tablet 8/29/2023 Self Yes Yes   Sig: Take 325 mg by mouth daily with breakfast   folic acid (FOLVITE) 1 mg tablet 8/29/2023 Self No Yes   Sig: Take 1 tablet (1 mg total) by mouth daily   gabapentin (NEURONTIN) 100 mg capsule 8/29/2023  No Yes   Sig: take 1 capsule by mouth three times a day   glucose blood test strip  Self No No   Sig: Use as instructed   glucose monitoring kit (FREESTYLE) monitoring kit  Self No No   Si each by Does not apply route daily   lisinopril (ZESTRIL) 20 mg tablet 2023 Self No Yes   Sig: Take 1 tablet (20 mg total) by mouth daily   magnesium hydroxide (MILK OF MAGNESIA) 400 mg/5 mL oral suspension Not Taking  No No   Sig: Take 30 mL by mouth daily as needed for constipation   Patient not taking: Reported on 2023   mirtazapine (REMERON) 15 mg tablet 2023 Self No Yes   Sig: Take 1 tablet (15 mg total) by mouth daily at bedtime   nicotine (NICODERM CQ) 14 mg/24hr TD 24 hr patch Not Taking  No No   Sig: Place 1 patch on the skin over 24 hours daily Do not start before 2023.    Patient not taking: Reported on 2023   omega-3-acid ethyl esters (LOVAZA) 1 g capsule 2023 Self Yes Yes   Sig: Take 2 capsules by mouth 2 (two) times a day   omeprazole (PriLOSEC) 20 mg delayed release capsule 2023  No Yes   Sig: Take 1 capsule (20 mg total) by mouth daily   ondansetron (ZOFRAN) 4 mg tablet Not Taking  No No   Sig: Take 1 tablet (4 mg total) by mouth every 6 (six) hours   Patient not taking: Reported on 2023   pantoprazole (PROTONIX) 40 mg tablet   No No   Sig: Take 1 tablet (40 mg total) by mouth 2 (two) times a day for 14 days   pregabalin (LYRICA) 300 MG capsule 2023 Self Yes Yes   Sig: Take 300 mg by mouth 3 (three) times a day   sitaGLIPtin-metFORMIN (JANUMET)  MG per tablet 2023 Self No Yes   Sig: Take 1 tablet by mouth 2 (two) times a day with meals   sucralfate (CARAFATE) 1 g/10 mL suspension 2023 Self No Yes   Sig: Take 10 mL (1 g total) by mouth 4 (four) times a day   thiamine 100 MG tablet 2023 Self No Yes   Sig: Take 1 tablet (100 mg total) by mouth daily      Facility-Administered Medications: None       Past Medical History:   Diagnosis Date   • Chest pain    • Chronic hepatitis C ([address]) 3/15/2013   • Diabetes mellitus (HCC)    • Heartburn    • Hepatitis C    • Indigestion    • Liver disease    • Myocardial infarction [facility])    • Seizures ([address])        History reviewed. No pertinent surgical history. Family History   Problem Relation Age of Onset   • Heart attack Mother    • Diabetes Mother    • Colon cancer Father    • Diabetes Father    • Alcohol abuse Neg Hx    • Substance Abuse Neg Hx    • Mental illness Neg Hx    • Depression Neg Hx      I have reviewed and agree with the history as documented. E-Cigarette/Vaping   • E-Cigarette Use Never User      E-Cigarette/Vaping Substances   • Nicotine No    • THC No    • CBD No    • Flavoring No    • Other No    • Unknown No      Social History     Tobacco Use   • Smoking status: Every Day     Packs/day: 0.50     Types: Cigarettes   • Smokeless tobacco: Never   Vaping Use   • Vaping Use: Never used   Substance Use Topics   • Alcohol use: Yes     Alcohol/week: 21.0 standard drinks of alcohol     Types: 21 Cans of beer per week   • Drug use: Not Currently     Types: Marijuana       Review of Systems   Constitutional: Negative for chills and fever. HENT: Negative for congestion, ear pain and sore throat. Eyes: Negative for pain and visual disturbance. Respiratory: Negative for cough, shortness of breath and wheezing. Cardiovascular: Negative for chest pain and leg swelling. Gastrointestinal: Negative for abdominal pain, diarrhea, nausea and vomiting. Genitourinary: Negative for dysuria, frequency, hematuria and urgency. Musculoskeletal: Negative for neck pain and neck stiffness. Skin: Negative for rash and wound. Neurological: Negative for weakness, numbness and headaches. Psychiatric/Behavioral: Negative for agitation and confusion. All other systems reviewed and are negative. Physical Exam  Physical Exam  Vitals and nursing note reviewed. Constitutional:       Appearance: He is well-developed. HENT:      Head: Normocephalic and atraumatic. Eyes:      Pupils: Pupils are equal, round, and reactive to light.    Cardiovascular:      Rate and Rhythm: Normal rate and regular rhythm. Pulmonary:      Effort: Pulmonary effort is normal.      Breath sounds: Normal breath sounds. Abdominal:      General: Bowel sounds are normal.      Palpations: Abdomen is soft. Musculoskeletal:         General: Normal range of motion. Cervical back: Normal range of motion and neck supple. Skin:     General: Skin is warm and dry. Neurological:      General: No focal deficit present. Mental Status: He is alert and oriented to person, place, and time.       Comments: No focal deficits         Vital Signs  ED Triage Vitals   Temperature Pulse Respirations Blood Pressure SpO2   08/29/23 2055 08/29/23 2050 08/29/23 2050 08/29/23 2050 08/29/23 2050   98.5 °F (36.9 °C) 83 18 169/78 99 %      Temp Source Heart Rate Source Patient Position - Orthostatic VS BP Location FiO2 (%)   08/29/23 2055 08/29/23 2050 08/29/23 2050 08/29/23 2050 --   Oral Monitor Lying Right arm       Pain Score       08/29/23 2050       No Pain           Vitals:    08/29/23 2050 08/29/23 2100 08/29/23 2130 08/30/23 0022   BP: 169/78 160/69 142/67 161/74   Pulse: 83 83 78 71   Patient Position - Orthostatic VS: Lying Lying  Lying         Visual Acuity      ED Medications  Medications   dextrose 5 % and sodium chloride 0.45 % with KCl 20 mEq/L infusion (has no administration in time range)   docusate sodium (COLACE) capsule 100 mg (has no administration in time range)   ondansetron (ZOFRAN) injection 4 mg (has no administration in time range)   nicotine (NICODERM CQ) 7 mg/24hr TD 24 hr patch 1 patch (has no administration in time range)   heparin (porcine) subcutaneous injection 5,000 Units (has no administration in time range)   piperacillin-tazobactam (ZOSYN) 2.25 g in sodium chloride 0.9 % 50 mL IVPB (2.25 g Intravenous New Bag 8/29/23 5069)   morphine injection 2 mg (has no administration in time range)   pantoprazole (PROTONIX) injection 40 mg (has no administration in time range) Diagnostic Studies  Results Reviewed     Procedure Component Value Units Date/Time    Urine Microscopic [[id number]]  (Normal) Collected: 08/29/23 2258    Lab Status: Final result Specimen: Urine, Clean Catch Updated: 08/29/23 2333     RBC, UA 1-2 /hpf      WBC, UA 1-2 /hpf      Epithelial Cells None Seen /hpf      Bacteria, UA Occasional /hpf     UA w Reflex to Microscopic w Reflex to Culture [[id number]]  (Abnormal) Collected: 08/29/23 2258    Lab Status: Final result Specimen: Urine, Clean Catch Updated: 08/29/23 2327     Color, UA Light Yellow     Clarity, UA Clear     Specific Gravity, UA 1.023     pH, UA 6.5     Leukocytes, UA Elevated glucose may cause decreased leukocyte values.  See urine microscopic for UWBC result     Nitrite, UA Negative     Protein, UA Negative mg/dl      Glucose, UA >=1000 (1%) mg/dl      Ketones, UA Negative mg/dl      Urobilinogen, UA <2.0 mg/dl      Bilirubin, UA Negative     Occult Blood, UA Negative    Platelet count [[id number]]     Lab Status: No result Specimen: Blood     CMP [[id number]]  (Abnormal) Collected: 08/29/23 2116    Lab Status: Final result Specimen: Blood from Arm, Left Updated: 08/29/23 2142     Sodium 137 mmol/L      Potassium 3.7 mmol/L      Chloride 105 mmol/L      CO2 22 mmol/L      ANION GAP 10 mmol/L      BUN 23 mg/dL      Creatinine 1.42 mg/dL      Glucose 200 mg/dL      Calcium 8.8 mg/dL      AST 17 U/L      ALT 14 U/L      Alkaline Phosphatase 51 U/L      Total Protein 6.8 g/dL      Albumin 3.6 g/dL      Total Bilirubin 0.33 mg/dL      eGFR 47 ml/min/1.73sq m     Narrative:      [place] guidelines for Chronic Kidney Disease (CKD):   •  Stage 1 with normal or high GFR (GFR > 90 mL/min/1.73 square meters)  •  Stage 2 Mild CKD (GFR = 60-89 mL/min/1.73 square meters)  •  Stage 3A Moderate CKD (GFR = 45-59 mL/min/1.73 square meters)  •  Stage 3B Moderate CKD (GFR = 30-44 mL/min/1.73 square meters)  •  Stage 4 Severe CKD (GFR = 15-29 mL/min/1.73 square meters)  •  Stage 5 End Stage CKD (GFR <15 mL/min/1.73 square meters)  Note: GFR calculation is accurate only with a steady state creatinine    Lipase [[id number]]  (Normal) Collected: 08/29/23 2116    Lab Status: Final result Specimen: Blood from Arm, Left Updated: 08/29/23 2142     Lipase 17 u/L     CBC and differential [[id number]]  (Abnormal) Collected: 08/29/23 2116    Lab Status: Final result Specimen: Blood from Arm, Left Updated: 08/29/23 2125     WBC 8.80 Thousand/uL      RBC 3.26 Million/uL      Hemoglobin 10.3 g/dL      Hematocrit 30.4 %      MCV 93 fL      MCH 31.6 pg      MCHC 33.9 g/dL      RDW 15.7 %      MPV 9.5 fL      Platelets 977 Thousands/uL      nRBC 0 /100 WBCs      Neutrophils Relative 75 %      Immat GRANS % 1 %      Lymphocytes Relative 17 %      Monocytes Relative 6 %      Eosinophils Relative 1 %      Basophils Relative 0 %      Neutrophils Absolute 6.62 Thousands/µL      Immature Grans Absolute 0.07 Thousand/uL      Lymphocytes Absolute 1.48 Thousands/µL      Monocytes Absolute 0.53 Thousand/µL      Eosinophils Absolute 0.09 Thousand/µL      Basophils Absolute 0.01 Thousands/µL                  CT abdomen pelvis wo contrast   Final Result by Nancy Goldberg MD (08/29 2229)      Acute appendicitis. No evidence of perforation or abscess within limitations of noncontrast exam.   Surgical consult advised. Above findings discussed with Dr. Rianna Simon at 10:15 p.m. on 8/29/2023. Workstation performed: [id number]                    Procedures  Procedures         ED Course  ED Course as of 08/30/23 0025   Tue Aug 29, 2023   2222 Spoke with Valorie Serrano from radiology- states that ht spoke with Dr Melissa Elias who read the ct scan today- states that CT abd/pel without contrast would be sufficient for repeat imaging today    2229 Spoke with radiology- states there is worsening appy. No perf.  Tiger text sent to Dr Kwasi Banegas Decision Making  69 y/o male here for abnormal ct scan from 8/18. States that he was called and told that his appendix was abnormal and was missed from prior read. Spoke with radiology and recommends repeat ct scan without contrast.     Spoke with Dr Magui Mejia , will admit to his service. Acute appendicitis: acute illness or injury  Amount and/or Complexity of Data Reviewed  Labs: ordered. Radiology: ordered. Risk  Decision regarding hospitalization. Disposition  Final diagnoses:   Acute appendicitis     Time reflects when diagnosis was documented in both MDM as applicable and the Disposition within this note     Time User Action Codes Description Comment    8/29/2023 10:43 PM Magui Mejia, Christopher Aguirre Dr [E78.2] Mixed hyperlipidemia     8/29/2023 10:43 PM Chick Magnolia Add [E11.42] Type 2 diabetes mellitus with diabetic polyneuropathy, without long-term current use of insulin ([address])     8/29/2023 10:43 PM Chick Magnolia Add [I25.10] Coronary artery disease involving native coronary artery of native heart without angina pectoris     8/29/2023 10:43 PM Chick Magnolia Add [I10] Essential hypertension     8/29/2023 10:43 PM Chick Magnolia Add [F10.20] Alcoholism ([address])     8/29/2023 10:43 PM Chick Magnolia Add Halit.Maxim. A] Depression, unspecified depression type     8/29/2023 10:46 PM Chick Magnolia Add [K35.80] Acute appendicitis, unspecified acute appendicitis type     8/29/2023 10:48 PM Chick Magnolia Add [I25.10] Coronary artery disease involving native heart without angina pectoris, unspecified vessel or lesion type     8/29/2023 10:59 PM Jasmin OH Add [K35.80] Acute appendicitis       ED Disposition     ED Disposition   Admit    Condition   Stable    Date/Time   Tue Aug 29, 2023 10:59 PM    Comment   Case was discussed with General surgery and the patient's admission status was agreed to be Admission Status: inpatient status to the service of Dr. Magui Mejia .            Follow-up Information    None         Current Discharge Medication List      CONTINUE these medications which have NOT CHANGED    Details   aspirin 81 mg chewable tablet Chew 81 mg daily      atorvastatin (LIPITOR) 20 mg tablet Take 1 tablet (20 mg total) by mouth daily  Qty: 90 tablet, Refills: 0    Associated Diagnoses: Mixed hyperlipidemia      clopidogrel (PLAVIX) 75 mg tablet Take 1 tablet (75 mg total) by mouth daily  Qty: 90 tablet, Refills: 0    Associated Diagnoses:  Altered mental status      ferrous sulfate 325 (65 Fe) mg tablet Take 325 mg by mouth daily with breakfast      folic acid (FOLVITE) 1 mg tablet Take 1 tablet (1 mg total) by mouth daily  Qty: 30 tablet, Refills: 1    Associated Diagnoses: Alcoholism ([facility])      gabapentin (NEURONTIN) 100 mg capsule take 1 capsule by mouth three times a day  Qty: 90 capsule, Refills: 0    Associated Diagnoses: Type 2 diabetes mellitus with diabetic polyneuropathy, without long-term current use of insulin ([facility])      lisinopril (ZESTRIL) 20 mg tablet Take 1 tablet (20 mg total) by mouth daily  Qty: 90 tablet, Refills: 0    Associated Diagnoses: Essential hypertension      mirtazapine (REMERON) 15 mg tablet Take 1 tablet (15 mg total) by mouth daily at bedtime  Qty: 30 tablet, Refills: 2    Associated Diagnoses: Other depression      omega-3-acid ethyl esters (LOVAZA) 1 g capsule Take 2 capsules by mouth 2 (two) times a day      omeprazole (PriLOSEC) 20 mg delayed release capsule Take 1 capsule (20 mg total) by mouth daily  Qty: 30 capsule, Refills: 0    Associated Diagnoses: Pancreatitis      pregabalin (LYRICA) 300 MG capsule Take 300 mg by mouth 3 (three) times a day      sitaGLIPtin-metFORMIN (JANUMET)  MG per tablet Take 1 tablet by mouth 2 (two) times a day with meals  Qty: 90 tablet, Refills: 0    Associated Diagnoses: Type 2 diabetes mellitus with diabetic polyneuropathy, without long-term current use of insulin ([facility])      sucralfate (CARAFATE) 1 g/10 mL suspension Take 10 mL (1 g total) by mouth 4 (four) times a day  Qty: 420 mL, Refills: 0    Associated Diagnoses: Gastritis      thiamine 100 MG tablet Take 1 tablet (100 mg total) by mouth daily  Qty: 30 tablet, Refills: 1    Associated Diagnoses: Alcoholism ([address])      Blood Pressure Monitor MARIELLA by Does not apply route daily  Qty: 1 Device, Refills: 0    Associated Diagnoses: Essential hypertension      glucose blood test strip Use as instructed  Qty: 100 each, Refills: 0    Associated Diagnoses: Type 2 diabetes mellitus with diabetic polyneuropathy, without long-term current use of insulin ([facility])      glucose monitoring kit (FREESTYLE) monitoring kit 1 each by Does not apply route daily  Qty: 1 each, Refills: 0    Associated Diagnoses: Type 2 diabetes mellitus with diabetic polyneuropathy, without long-term current use of insulin ([facility])      Lancets (FREESTYLE) lancets Use as instructed  Qty: 100 each, Refills: 0    Associated Diagnoses: Type 2 diabetes mellitus with diabetic polyneuropathy, without long-term current use of insulin ([facility])      magnesium hydroxide (MILK OF MAGNESIA) 400 mg/5 mL oral suspension Take 30 mL by mouth daily as needed for constipation  Qty: 354 mL, Refills: 0    Associated Diagnoses: Constipation, unspecified constipation type      nicotine (NICODERM CQ) 14 mg/24hr TD 24 hr patch Place 1 patch on the skin over 24 hours daily Do not start before August 21, 2023. Qty: 28 patch, Refills: 0    Associated Diagnoses: Smoking      ondansetron (ZOFRAN) 4 mg tablet Take 1 tablet (4 mg total) by mouth every 6 (six) hours  Qty: 12 tablet, Refills: 0    Associated Diagnoses: Pancreatitis      pantoprazole (PROTONIX) 40 mg tablet Take 1 tablet (40 mg total) by mouth 2 (two) times a day for 14 days  Qty: 28 tablet, Refills: 0    Associated Diagnoses: Gastritis             No discharge procedures on file.     PDMP Review     None          ED Provider  Electronically Signed by           Johnny Weaver DO  08/30/23 [id number]

## 2023-08-30 NOTE — ANESTHESIA PREPROCEDURE EVALUATION
Procedure:  APPENDECTOMY LAPAROSCOPIC (Abdomen)    Concern for acute appendicitis surgeon requesting urgent    Hx of EtOH use/withdrawal  - Hx of "Handle" of liquor daily, last drink >2 weeks ago  - CIWA protocol, no recent benzos on MAR    CAD s/p 7 stents on clopidogrel  No Echos on file  - Cardiology consulted - moderate risk surgery    WBC 6.96, Hgb 9.6, Mag 1.4, Cr. 1.33      Relevant Problems   CARDIO   (+) Coronary artery disease involving native coronary artery of native heart without angina pectoris   (+) Essential hypertension   (+) Hyperlipidemia      ENDO   (+) Type 2 diabetes mellitus with diabetic polyneuropathy, without long-term current use of insulin (HCC)      GI/HEPATIC   (+) Chronic hepatitis C (HCC)   (+) Other cirrhosis of liver (HCC)      MUSCULOSKELETAL   (+) Arthritis      NEURO/PSYCH   (+) Depression      PULMONARY   (+) Smoking     -echocardiogram pending  -will obtain routine EEG as well given seizure activity leading to presentation  -hold on AED initiation unless EEG abnormal             Anesthesia Plan  ASA Score- 3 Emergent    Anesthesia Type- general with ASA Monitors. Additional Monitors:   Airway Plan: ETT. Comment: Risks of general anesthesia discussed including likely possibility of PONV and sore throat, as well as the rare possibilities of aspiration, dental/oropharyngeal/ocular injuries, or grave/life threatening anesthetic and surgical emergencies. .       Plan Factors-    Chart reviewed. Patient summary reviewed. Patient instructed to abstain from smoking on day of procedure. Patient did not smoke on day of surgery. Induction- intravenous. Postoperative Plan- Plan for postoperative opioid use. Planned trial extubation    Informed Consent- Anesthetic plan and risks discussed with patient. I personally reviewed this patient with the CRNA. Discussed and agreed on the Anesthesia Plan with the CRNA. Hermila Montana

## 2023-08-30 NOTE — CONSULTS
Consultation - Cardiology   [facility] 68 y.o. male MRN: [id number]  Unit/Bed#: -02 Encounter: [id number]  08/30/23  9:03 AM    Assessment/Plan:  1. Preoperative cardiovascular risk stratification  • General surgery planning appendectomy  • Given patient's age and comorbidities he will classify as a moderate risk for procedure. No overt contraindication from a cardiac standpoint. 2.  Acute appendicitis  • Management per general surgery - planning appendectomy today    3. CAD s/p multiple PCI  • Denies chest pain  • DAPT held preoperatively, resume per general surgery  • Would be reasonable to consider addition of BB pending clinical course  • Continue atorvastatin    4. Hypertension  • Reasonably well controlled, continue to monitor  • Lisinopril held per general surgery     5. Hyperlipidemia  • Continue atorvastatin and dietary control    6. T2DM  • Management per primary service    7. PVD s/p reported LE stents  • DAPT held per general surgery, continue atorvastatin    8. Hepatitis C  • Continue follow up with gastroenterology    9. Tobacco and alcohol use  • Cessation advised        History of Present Illness   Physician Requesting Consult: Ana Toussaint MD    Reason for Consult / Principal Problem:  Preoperative cardiovascular risk stratification    HPI: [facility] is a 68y.o. year old male with history of CAD s/p multiple PCI, hypertension, hyperlipidemia, T2DM, PVD s/p reported LE stents, hepatitis C, tobacco and alcohol abuse who presents with abnormal CT scan. Patient was admitted on 8/18/2023 for abdominal pain and alcohol withdrawal.  He was evaluated by GI and discharged to home. However, he was recently called by radiology who informed of missed read on abdominal CT scan and advised visitation to the ED. Abdominal CT suggests acute appendicitis. Cardiology consulted for preoperative risk stratification.   Previously lived in [place], [place] where he followed with cardiology. He has yet to establish with cardiology locally. Per chart review, internal medicine note in 2019 suggests history of 7 coronary stents. Patient states last coronary stent was placed in 2006. Denies history of cardiac diease or cardiac procedures/surgeries otherwise. He has been compliant with home medications. Endorses history of both tobacco and alcohol use. He smokes approximately half a pack of cigarettes per day and is working on cutting back on alcohol consumption. Does note mild shortness of breath which has been stable for many years. Denies chest pain, palpitations, LE edema, orthopnea, PND or syncope. Inpatient consult to Cardiology  Consult performed by: Molly Albert PA-C  Consult ordered by: Lyssa Don MD          EKG: Sinus bradycardia with intermittent junctional beats; septal infarct, age undetermined (8/18/2023)      Review of Systems   Constitutional: Negative for chills, diaphoresis and fever. HENT: Negative for ear pain and sore throat. Eyes: Negative for pain and visual disturbance. Respiratory: Negative for cough. Shortness of breath: chronic. Cardiovascular: Negative for chest pain, palpitations and leg swelling. Gastrointestinal: Negative for vomiting. Genitourinary: Negative for dysuria and hematuria. Musculoskeletal: Negative for arthralgias and back pain. Skin: Negative for color change and rash. Neurological: Negative for seizures and syncope. All other systems reviewed and are negative. Historical Information   Past Medical History:   Diagnosis Date   • Chest pain    • Chronic hepatitis C ([address]) 3/15/2013   • Diabetes mellitus ([address])    • Heartburn    • Hepatitis C    • Indigestion    • Liver disease    • Myocardial infarction ([address])    • Seizures ([address])      History reviewed. No pertinent surgical history.   Social History     Substance and Sexual Activity   Alcohol Use Yes   • Alcohol/week: 21.0 standard drinks of alcohol   • Types: 21 Cans of beer per week     Social History     Substance and Sexual Activity   Drug Use Not Currently   • Types: Marijuana     Social History     Tobacco Use   Smoking Status Every Day   • Packs/day: 0.50   • Types: Cigarettes   Smokeless Tobacco Never       Family History:   Family History   Problem Relation Age of Onset   • Heart attack Mother    • Diabetes Mother    • Colon cancer Father    • Diabetes Father    • Alcohol abuse Neg Hx    • Substance Abuse Neg Hx    • Mental illness Neg Hx    • Depression Neg Hx        Meds/Allergies   all current active meds have been reviewed  No Known Allergies    Objective   Vitals: Blood pressure 142/61, pulse 64, temperature 98 °F (36.7 °C), resp. rate 20, weight 66 kg (145 lb 8.1 oz), SpO2 97 %. , Body mass index is 24.21 kg/m².,   Orthostatic Blood Pressures    Flowsheet Row Most Recent Value   Blood Pressure 142/61 filed at 08/30/2023 0717   Patient Position - Orthostatic VS Lying filed at 08/30/2023 9190          Systolic (96GNK), LII:513 , Min:142 , POS:194     Diastolic (71CVP), MSZ:89, Min:61, Max:78        Intake/Output Summary (Last 24 hours) at 8/30/2023 0441  Last data filed at 8/30/2023 0212  Gross per 24 hour   Intake 50 ml   Output --   Net 50 ml       Invasive Devices     Peripheral Intravenous Line  Duration           Peripheral IV 08/29/23 Left Antecubital <1 day                    Physical Exam:  Physical Exam  Vitals reviewed. Constitutional:       General: He is not in acute distress. Appearance: He is not diaphoretic. HENT:      Head: Normocephalic and atraumatic. Nose: Nose normal.   Eyes:      Conjunctiva/sclera: Conjunctivae normal.   Cardiovascular:      Rate and Rhythm: Normal rate and regular rhythm. Pulses: Normal pulses. Heart sounds: Normal heart sounds. No murmur heard. No friction rub. Pulmonary:      Effort: Pulmonary effort is normal. No respiratory distress. Breath sounds: Normal breath sounds.  No wheezing or rales. Chest:      Chest wall: No tenderness. Abdominal:      Palpations: Abdomen is soft. Musculoskeletal:         General: No swelling. Cervical back: Neck supple. Right lower leg: No edema. Left lower leg: No edema. Skin:     General: Skin is warm and dry. Capillary Refill: Capillary refill takes less than 2 seconds. Neurological:      Mental Status: He is alert and oriented to person, place, and time.    Psychiatric:         Mood and Affect: Mood normal.          Lab Results:     Cardiac Profile:       CBC with diff:   Results from last 7 days   Lab Units 08/30/23  0449 08/29/23  2116   WBC Thousand/uL 6.96 8.80   HEMOGLOBIN g/dL 9.6* 10.3*   HEMATOCRIT % 28.2* 30.4*   MCV fL 92 93   PLATELETS Thousands/uL 309 352   RBC Million/uL 3.06* 3.26*   MCH pg 31.4 31.6   MCHC g/dL 34.0 33.9   RDW % 15.5* 15.7*   MPV fL 9.3 9.5   NRBC AUTO /100 WBCs 0 0         CMP:   Results from last 7 days   Lab Units 08/30/23  0449 08/29/23  2116   POTASSIUM mmol/L 3.9 3.7   CHLORIDE mmol/L 108 105   CO2 mmol/L 25 22   BUN mg/dL 21 23   CREATININE mg/dL 1.33* 1.42*   CALCIUM mg/dL 8.6 8.8   AST U/L 14 17   ALT U/L 12 14   ALK PHOS U/L 43 51   EGFR ml/min/1.73sq m 51 47

## 2023-08-30 NOTE — CONSULTS
[address]  Consult  Name: Alex Ogden 68 y.o. male I MRN: [id number]  Unit/Bed#: -02 I Date of Admission: 8/29/2023   Date of Service: 8/30/2023 I Hospital Day: 1    Inpatient consult to Internal Medicine  Consult performed by: Sariah Garcia MD  Consult ordered by: Baljinder Palacios MD          Assessment/Plan   * Acute appendicitis  Assessment & Plan  Being managed by primary team/surgery team  Underwent laparoscopic surgery today  Management by primary team    Essential hypertension  Assessment & Plan  Lisinopril held preoperatively  Can likely resume tomorrow    Hyperlipidemia  Assessment & Plan  Can resume statin once cleared by surgical team for p.o. intake    Coronary artery disease involving native coronary artery of native heart without angina pectoris  Assessment & Plan  History of coronary artery disease status post PCI  Can resume dual antiplatelet therapy postop  Continue statin    Type 2 diabetes mellitus with diabetic polyneuropathy, without long-term current use of insulin ([address])  Assessment & Plan  Lab Results   Component Value Date    HGBA1C 6.9 (H) 10/18/2019       Recent Labs     08/30/23  1626   POCGLU 165*       Blood Sugar Average: Last 72 hrs:  (P) 165   Recommend repeat A1c  Sliding scale insulin for now  Hold home p.o. agents             VTE Prophylaxis: Sequential compression device (Venodyne)   / sequential compression device     Recommendations for Discharge:  · na    Counseling / Coordination of Care Time: 45 minutes. Greater than 50% of total time spent on patient counseling and coordination of care. Collaboration of Care: Were Recommendations Directly Discussed with Primary Treatment Team? - Yes     History of Present Illness:    Alex Ogden is a 68 y.o. male who is originally admitted to the general surgery service due to appendicitis . We are consulted for  medical management of diabetes, hypertension, CAD.   Currently without any acute complaints presented initially with abdominal pain 2 days ago. Was sent back into the emergency department due to abnormal CT scan. Currently without abdominal pain or any other complaints. Review of Systems:    Review of Systems   Constitutional: Negative for appetite change, chills, diaphoresis, fatigue, fever and unexpected weight change. HENT: Negative for congestion, rhinorrhea and sore throat. Eyes: Negative for photophobia and visual disturbance. Respiratory: Negative for cough, shortness of breath and wheezing. Cardiovascular: Negative for chest pain, palpitations and leg swelling. Gastrointestinal: Positive for abdominal pain. Negative for anal bleeding, blood in stool, constipation, diarrhea, nausea and vomiting. Genitourinary: Negative for decreased urine volume, difficulty urinating, dysuria, flank pain, frequency, hematuria and urgency. Musculoskeletal: Negative for arthralgias, back pain, joint swelling and myalgias. Skin: Negative for color change and rash. Neurological: Negative for dizziness, seizures, facial asymmetry, speech difficulty, numbness and headaches. Psychiatric/Behavioral: Negative for agitation, confusion and decreased concentration. The patient is not nervous/anxious. Past Medical and Surgical History:     Past Medical History:   Diagnosis Date   • Chest pain    • Chronic hepatitis C ([address]) 3/15/2013   • Diabetes mellitus ([address])    • Heartburn    • Hepatitis C    • Indigestion    • Liver disease    • Myocardial infarction ([address])    • Seizures ([address])        History reviewed. No pertinent surgical history. Meds/Allergies:    PTA meds:   Prior to Admission Medications   Prescriptions Last Dose Informant Patient Reported? Taking?    Blood Pressure Monitor MARIELLA Unknown Self No No   Sig: by Does not apply route daily   Lancets (FREESTYLE) lancets  Self No No   Sig: Use as instructed   aspirin 81 mg chewable tablet 8/29/2023 Self Yes Yes   Sig: Chew 81 mg daily   atorvastatin (LIPITOR) 20 mg tablet 2023 Self No Yes   Sig: Take 1 tablet (20 mg total) by mouth daily   clopidogrel (PLAVIX) 75 mg tablet 2023 Self No Yes   Sig: Take 1 tablet (75 mg total) by mouth daily   ferrous sulfate 325 (65 Fe) mg tablet 2023 Self Yes Yes   Sig: Take 325 mg by mouth daily with breakfast   folic acid (FOLVITE) 1 mg tablet 2023 Self No Yes   Sig: Take 1 tablet (1 mg total) by mouth daily   gabapentin (NEURONTIN) 100 mg capsule 2023  No Yes   Sig: take 1 capsule by mouth three times a day   glucose blood test strip  Self No No   Sig: Use as instructed   glucose monitoring kit (FREESTYLE) monitoring kit  Self No No   Si each by Does not apply route daily   lisinopril (ZESTRIL) 20 mg tablet 2023 Self No Yes   Sig: Take 1 tablet (20 mg total) by mouth daily   magnesium hydroxide (MILK OF MAGNESIA) 400 mg/5 mL oral suspension Not Taking  No No   Sig: Take 30 mL by mouth daily as needed for constipation   Patient not taking: Reported on 2023   mirtazapine (REMERON) 15 mg tablet 2023 Self No Yes   Sig: Take 1 tablet (15 mg total) by mouth daily at bedtime   nicotine (NICODERM CQ) 14 mg/24hr TD 24 hr patch Not Taking  No No   Sig: Place 1 patch on the skin over 24 hours daily Do not start before 2023.    Patient not taking: Reported on 2023   omega-3-acid ethyl esters (LOVAZA) 1 g capsule 2023 Self Yes Yes   Sig: Take 2 capsules by mouth 2 (two) times a day   omeprazole (PriLOSEC) 20 mg delayed release capsule 2023  No Yes   Sig: Take 1 capsule (20 mg total) by mouth daily   ondansetron (ZOFRAN) 4 mg tablet Not Taking  No No   Sig: Take 1 tablet (4 mg total) by mouth every 6 (six) hours   Patient not taking: Reported on 2023   pantoprazole (PROTONIX) 40 mg tablet   No No   Sig: Take 1 tablet (40 mg total) by mouth 2 (two) times a day for 14 days   pregabalin (LYRICA) 300 MG capsule 2023 Self Yes Yes   Sig: Take 300 mg by mouth 3 (three) times a day   sitaGLIPtin-metFORMIN (JANUMET)  MG per tablet 8/29/2023 Self No Yes   Sig: Take 1 tablet by mouth 2 (two) times a day with meals   sucralfate (CARAFATE) 1 g/10 mL suspension 8/29/2023 Self No Yes   Sig: Take 10 mL (1 g total) by mouth 4 (four) times a day   thiamine 100 MG tablet 8/29/2023 Self No Yes   Sig: Take 1 tablet (100 mg total) by mouth daily      Facility-Administered Medications: None       Allergies: No Known Allergies    Social History:     Marital Status: /Civil Union    Substance Use History:   Social History     Substance and Sexual Activity   Alcohol Use Yes   • Alcohol/week: 21.0 standard drinks of alcohol   • Types: 21 Cans of beer per week     Social History     Tobacco Use   Smoking Status Every Day   • Packs/day: 0.50   • Types: Cigarettes   Smokeless Tobacco Never     Social History     Substance and Sexual Activity   Drug Use Not Currently   • Types: Marijuana       Family History:    Family History   Problem Relation Age of Onset   • Heart attack Mother    • Diabetes Mother    • Colon cancer Father    • Diabetes Father    • Alcohol abuse Neg Hx    • Substance Abuse Neg Hx    • Mental illness Neg Hx    • Depression Neg Hx        Physical Exam:     Vitals:   Blood Pressure: 146/60 (08/30/23 1647)  Pulse: 69 (08/30/23 1647)  Temperature: (!) 97.2 °F (36.2 °C) (08/30/23 1647)  Temp Source: Temporal (08/30/23 1223)  Respirations: 12 (08/30/23 1615)  Weight - Scale: 66 kg (145 lb 8.1 oz) (08/30/23 0022)  SpO2: 93 % (08/30/23 1647)    Physical Exam  Constitutional:       General: He is not in acute distress. Appearance: He is well-developed. He is not diaphoretic. HENT:      Head: Normocephalic and atraumatic. Nose: Nose normal.      Mouth/Throat:      Pharynx: No oropharyngeal exudate. Eyes:      General: No scleral icterus.      Conjunctiva/sclera: Conjunctivae normal.   Cardiovascular:      Rate and Rhythm: Normal rate and regular rhythm. Heart sounds: Normal heart sounds. No murmur heard. No friction rub. No gallop. Pulmonary:      Effort: Pulmonary effort is normal. No respiratory distress. Breath sounds: Normal breath sounds. No wheezing or rales. Chest:      Chest wall: No tenderness. Abdominal:      General: Bowel sounds are normal. There is no distension. Palpations: Abdomen is soft. Tenderness: There is no abdominal tenderness. There is no guarding. Musculoskeletal:         General: No tenderness or deformity. Normal range of motion. Cervical back: Normal range of motion and neck supple. Skin:     General: Skin is warm and dry. Findings: No erythema. Neurological:      Mental Status: He is alert. Mental status is at baseline. Additional Data:     Lab Results: I have personally reviewed pertinent reports. Results from last 7 days   Lab Units 08/30/23  0449   WBC Thousand/uL 6.96   HEMOGLOBIN g/dL 9.6*   HEMATOCRIT % 28.2*   PLATELETS Thousands/uL 309   NEUTROS PCT % 72   LYMPHS PCT % 20   MONOS PCT % 7   EOS PCT % 1     Results from last 7 days   Lab Units 08/30/23  0449   SODIUM mmol/L 138   POTASSIUM mmol/L 3.9   CHLORIDE mmol/L 108   CO2 mmol/L 25   BUN mg/dL 21   CREATININE mg/dL 1.33*   ANION GAP mmol/L 5   CALCIUM mg/dL 8.6   ALBUMIN g/dL 3.3*   TOTAL BILIRUBIN mg/dL 0.40   ALK PHOS U/L 43   ALT U/L 12   AST U/L 14   GLUCOSE RANDOM mg/dL 185*             Lab Results   Component Value Date/Time    HGBA1C 6.9 (H) 10/18/2019 05:49 AM     Results from last 7 days   Lab Units 08/30/23  1626   POC GLUCOSE mg/dl 165*           Imaging: I have personally reviewed pertinent reports. CT abdomen pelvis wo contrast   Final Result by Natasha Roca MD (08/29 2229)      Acute appendicitis. No evidence of perforation or abscess within limitations of noncontrast exam.   Surgical consult advised. Above findings discussed with Dr. Hattie River at 10:15 p.m. on 8/29/2023. Workstation performed: [id number]             EKG, Pathology, and Other Studies Reviewed on Admission:   · EKG: reviewed    ** Please Note: This note has been constructed using a voice recognition system.  **

## 2023-08-30 NOTE — ASSESSMENT & PLAN NOTE
History of coronary artery disease status post PCI  Can resume dual antiplatelet therapy postop  Continue statin

## 2023-08-30 NOTE — OP NOTE
OPERATIVE REPORT  PATIENT NAME: Flower Zuniga    :  1945  MRN: [id number]  Pt Location: [facility] OR ROOM 01    SURGERY DATE: 2023    Surgeon(s) and Role:     * Deana Langston DO - Primary     * Rolando Saldivar PA-C - Assisting    Preop Diagnosis:  Acute appendicitis, unspecified acute appendicitis type [K35.80]    Post-Op Diagnosis Codes:     * Acute appendicitis, unspecified acute appendicitis type [K35.80]   Acute necrotic and perforated appendix    Procedure(s):  APPENDECTOMY LAPAROSCOPIC    Specimen(s):  ID Type Source Tests Collected by Time Destination   1 : Appendix Tissue Appendix TISSUE EXAM Deana Langston DO 2023 1345        Estimated Blood Loss:   Minimal    Drains:  Closed/Suction Drain Lateral LLQ Bulb 10 Fr. (Active)   Number of days: 0       Anesthesia Type:   General    Operative Indications:  Acute appendicitis, unspecified acute appendicitis type [K35.80]    Operative Findings: There was significant inflammatory reaction in the right lower quadrant  The appendix was noted to be necrotic in the midportion with what appeared to be an abscess  Any purulence was suctioned and irrigated  The appendix was very friable and was removed in pieces    Complications:   None    Procedure and Technique:  The patient was seen again in Preoperative Holding. All the risks, benefits, complications, treatment options, and expected outcomes were discussed with the patient and family at length. All questions were answered to satisfaction. There was concurrence with the proposed plan and informed consent was obtained. The site of surgery was properly noted/marked. The patient was taken to Operating Room, identified, and the procedure verified as laparoscopic appendectomy, possible open. The patient was placed in the supine position on the operating room table. The patient had received preoperative antibiotics and SCDs placed on the bilateral lower extremities.   Anesthesia was then induced and the patient was intubated. A Hernandez catheter was not placed as the patient voided prior to coming to the operating room. The patient's left arm was tucked. The abdomen was then prepped and draped in the usual sterile fashion using ChloraPrep. A Time-Out was then performed with all involved present confirming the correct patient, procedure, antibiotics, and any additional concerns. A 1.5 centimeter supraumbilical incision was made in a transverse fashion using a #15 blade and the umbilical stalk was grasped and elevated. The patient was noted to have an umbilical hernia and the umbilicus was elevated off the fascia. The fascial edges were grasped with Kocher clamps. Using a large blunt Cuca clamp the peritoneum was entered under direct visualization. A 12 mm port was then placed in the fascial opening and pneumoperitoneum was established. Initial pressure upon establishing pneumoperitoneum was noted to be low. An additional 5 mm port was then placed suprapubically in the midline under direct visualization. An additional 5 mm port was then placed in the left lower quadrant in the midclavicular line under direct visualization. The patient was then placed in Trendelenburg and left lateral decubitus position. The small intestines were retracted in the cephalad and left lateral direction away from the pelvis and right lower quadrant. There was noted to be a significant inflammatory reaction in the right lower quadrant. The appendix was noted to be retrocecal and retroperitoneal.  The appendix was significantly adhered to the surrounding structures and was slowly peeled off both the terminal ileum and cecum. There was noted to be necrosis in the midportion of the appendix with what appeared to be an associated abscess. The appendix was very friable and there was purulence that came from around and also an appendix. This was suctioned and irrigated.   The appendix was then carefully dissected and freed from any surrounding structures. A window was made in the mesoappendix at the base of the appendix. The appendix was then divided at the base using an Endo-JORGE stapler with a blue load. A vascular load Endo-JORGE was used to transect the mesentery. There was no evidence of leakage after division of the appendix and mesoappendix. There was a small amount of oozing from the surrounding peritoneum secondary to the significant inflammation. This stopped on its own. The right lower quadrant was irrigated with normal saline and suction. The appendix was placed in the Endo-Catch bag and removed via the umbilical port. There were some serosal tears noted in the terminal ileum and also on the cecum secondary to dissection. Using a laparoscopic needle  interrupted 3-0 silk were used to reinforce these areas. A 10 Bengali flat TONNY drain was then placed into the abdomen and brought out of the left lower quadrant incision. The drain was placed in the right lower quadrant in the area of the appendix was laying. This was stitched to the skin using interrupted 3-0 nylon. The abdomen was desufflated and the supraumbilical defect was closed with 0 Vicryl in an interrupted fashion using 5 stitches. The abdomen was then reinsufflated and the fascial incision was inspected and noted to be hemostatic without any insufflation leakage. The umbilicus was reattached to the fascia using interrupted 0 Vicryl. The remaining 5 mm ports were removed and the abdomen was desufflated. Local anesthesia infiltrated in the port sites using 0.25% Marcaine. The port sites were then closed using 4-0 Monocryl. The abdomen was then cleansed and dried and 30 strips, 2 x 2, Tegaderm were used to cover the sites. A drain sponge was placed at the TONNY drain site. All instrument, sponge, and needle counts were noted to be correct at the conclusion of the case.   The patient was brought to the PACU in stable and satisfactory condition. I was present for the entire procedure., A qualified resident physician was not available. and A physician assistant was required during the procedure for retraction, tissue handling, dissection and suturing.     Patient Disposition:  extubated and stable    This procedure was not performed to treat colon cancer through resection      SIGNATURE: Adams Parekh DO  DATE: August 30, 2023  TIME: 3:40 PM

## 2023-08-30 NOTE — ASSESSMENT & PLAN NOTE
Lab Results   Component Value Date    HGBA1C 6.9 (H) 10/18/2019       Recent Labs     08/30/23  1626   POCGLU 165*       Blood Sugar Average: Last 72 hrs:  (P) 165   Recommend repeat A1c  Sliding scale insulin for now  Hold home p.o. agents

## 2023-08-30 NOTE — UTILIZATION REVIEW
Initial Clinical Review    Admission: Date/Time/Statement:   Admission Orders (From admission, onward)     Ordered        08/29/23 2246  Inpatient Admission  Once                      Orders Placed This Encounter   Procedures   • Inpatient Admission     Standing Status:   Standing     Number of Occurrences:   1     Order Specific Question:   Level of Care     Answer:   Med Surg [16]     Order Specific Question:   Estimated length of stay     Answer:   More than 2 Midnights     Order Specific Question:   Certification     Answer:   I certify that inpatient services are medically necessary for this patient for a duration of greater than two midnights. See H&P and MD Progress Notes for additional information about the patient's course of treatment. ED Arrival Information     Expected   -    Arrival   8/29/2023 20:39    Acuity   Urgent            Means of arrival   Walk-In    Escorted by   Family Member    Service   Surgery-General    Admission type   Emergency            Arrival complaint   Abnormal Diagnostic Testing           Chief Complaint   Patient presents with   • Evaluation of Abnormal Diagnostic Test     Pt arrives ambulatory after being told to return to hospital by radiology due to misread of CT scan. Pt denies pain, N/V, and fevers        Initial Presentation: 68 y.o. male w/ PMH of depression, T2DM, HLD, HTN, CAD on ASA and plavix presented to the ED from home after being called with a read from a previous admission CAT scan 2 wks ago concerning for acute appendicitis. Patient reports not drinking since discharge. Denies any abdominal pain. Last dose of Plavix yesterday. In the ED, on exam, abd soft, nt, nd. Given IV Zosyn. Admitted as Inpatient for acute appendicitis. Plan: Plan for OR today for laparoscopic appendectomy, possible open. NPO/IVF for OR today, ok for diet post-operatively. Cardiology consult for surgical risk stratification for appendectomy. SLIM consult for medical management. IV Antibiotics for microbial coverage. Trend labs, monitor WBC  Monitor vitals. Pain control PRN. Serial abdominal exams. DVT ppx. Date: 08/30  Day 2:   Cardiology consult: This patient has no specific contraindication from cardiac standpoint to proceed with the planned surgery for appendicitis. Patient presents moderate cardiac risk based on age and comorbidities. OP Note:  SURGERY DATE: 8/30/2023  Procedure(s): APPENDECTOMY LAPAROSCOPIC  Anesthesia Type: General  Operative Findings: There was significant inflammatory reaction in the right lower quadrant  The appendix was noted to be necrotic in the midportion with what appeared to be an abscess  Any purulence was suctioned and irrigated  The appendix was very friable and was removed in pieces       Date: 08/31   Day 3: Has surpassed a 2nd midnight with active treatments and services, which include: cont pain control prn, cont IVF, changed IV abx from IV Zosyn to IV Unasyn, cont. Mon vitals and labs.       ED Triage Vitals   Temperature Pulse Respirations Blood Pressure SpO2   08/29/23 2055 08/29/23 2050 08/29/23 2050 08/29/23 2050 08/29/23 2050   98.5 °F (36.9 °C) 83 18 169/78 99 %      Temp Source Heart Rate Source Patient Position - Orthostatic VS BP Location FiO2 (%)   08/29/23 2055 08/29/23 2050 08/29/23 2050 08/29/23 2050 --   Oral Monitor Lying Right arm       Pain Score       08/29/23 2050       No Pain          Wt Readings from Last 1 Encounters:   08/30/23 66 kg (145 lb 8.1 oz)     Additional Vital Signs:   Date/Time Temp Pulse Resp BP MAP (mmHg) SpO2 O2 Device Patient Position - Orthostatic VS   08/30/23 1223 97.7 °F (36.5 °C) 65 18 152/66 -- 98 % None (Room air) --   08/30/23 07:17:56 98 °F (36.7 °C) 64 -- 142/61 88 97 % -- --   08/30/23 00:22:35 98.5 °F (36.9 °C) 71 20 161/74 103 99 % None (Room air) Lying   08/29/23 2130 -- 78 16 142/67 97 98 % None (Room air) --   08/29/23 2100 -- 83 18 160/69 99 98 % None (Room air) Lying   08/29/23 2055 98.5 °F (36.9 °C) -- -- -- -- -- -- --       Pertinent Labs/Diagnostic Test Results:   CT abdomen pelvis wo contrast   Final Result by Jonathon Haas MD (08/29 2229)      Acute appendicitis. No evidence of perforation or abscess within limitations of noncontrast exam.   Surgical consult advised. Above findings discussed with Dr. Rayne Alonso at 10:15 p.m. on 8/29/2023.             Workstation performed: [id number]               Results from last 7 days   Lab Units 08/31/23  1034 08/30/23 0449 08/29/23 2116   WBC Thousand/uL 8.26 6.96 8.80   HEMOGLOBIN g/dL 9.4* 9.6* 10.3*   HEMATOCRIT % 29.0* 28.2* 30.4*   PLATELETS Thousands/uL 323 309 352   NEUTROS ABS Thousands/µL 6.47 5.00 6.62         Results from last 7 days   Lab Units 08/31/23 1034 08/30/23 0449 08/29/23 2116   SODIUM mmol/L 135 138 137   POTASSIUM mmol/L 4.2 3.9 3.7   CHLORIDE mmol/L 106 108 105   CO2 mmol/L 23 25 22   ANION GAP mmol/L 6 5 10   BUN mg/dL 10 21 23   CREATININE mg/dL 1.30 1.33* 1.42*   EGFR ml/min/1.73sq m 52 51 47   CALCIUM mg/dL 8.3* 8.6 8.8   MAGNESIUM mg/dL  --  1.4*  --    PHOSPHORUS mg/dL  --  3.5  --      Results from last 7 days   Lab Units 08/31/23  1034 08/30/23 0449 08/29/23 2116   AST U/L 12* 14 17   ALT U/L 9 12 14   ALK PHOS U/L 46 43 51   TOTAL PROTEIN g/dL 6.3* 6.1* 6.8   ALBUMIN g/dL 3.4* 3.3* 3.6   TOTAL BILIRUBIN mg/dL 0.44 0.40 0.33     Results from last 7 days   Lab Units 08/31/23  1036 08/31/23  0717 08/30/23  1626 08/30/23  1228   POC GLUCOSE mg/dl 209* 229* 165* 179*     Results from last 7 days   Lab Units 08/31/23  1034 08/30/23 0449 08/29/23  2116   GLUCOSE RANDOM mg/dL 211* 185* 200*           Results from last 7 days   Lab Units 08/29/23  2116   LIPASE u/L 17                 Results from last 7 days   Lab Units 08/29/23  2258   CLARITY UA  Clear   COLOR UA  Light Yellow   SPEC GRAV UA  1.023   PH UA  6.5   GLUCOSE UA mg/dl >=1000 (1%)*   KETONES UA mg/dl Negative   BLOOD UA  Negative   PROTEIN UA mg/dl Negative NITRITE UA  Negative   BILIRUBIN UA  Negative   UROBILINOGEN UA (BE) mg/dl <2.0   LEUKOCYTES UA  Elevated glucose may cause decreased leukocyte values. See urine microscopic for UWBC result*   WBC UA /hpf 1-2   RBC UA /hpf 1-2   BACTERIA UA /hpf Occasional   EPITHELIAL CELLS WET PREP /hpf None Seen         ED Treatment:   Medication Administration from 08/29/2023 2039 to 08/30/2023 0018       Date/Time Order Dose Route Action     08/30/2023 0012 EDT piperacillin-tazobactam (ZOSYN) 2.25 g in sodium chloride 0.9 % 50 mL IVPB 0 g Intravenous Stopped     08/29/2023 2342 EDT piperacillin-tazobactam (ZOSYN) 2.25 g in sodium chloride 0.9 % 50 mL IVPB 2.25 g Intravenous New Bag        Past Medical History:   Diagnosis Date   • Chest pain    • Chronic hepatitis C ([address]) 3/15/2013   • Diabetes mellitus ([address])    • Heartburn    • Hepatitis C    • Indigestion    • Liver disease    • Myocardial infarction ([address])    • Seizures ([address])      Present on Admission:  • Acute appendicitis  • Coronary artery disease involving native coronary artery of native heart without angina pectoris  • Alcoholism ([address])  • Depression  • Essential hypertension  • Hyperlipidemia  • Peripheral vascular disease ([address])  • Type 2 diabetes mellitus with diabetic polyneuropathy, without long-term current use of insulin (HCC)      Admitting Diagnosis: Mixed hyperlipidemia [E78.2]  Alcoholism ([address]) [F10.20]  Essential hypertension [I10]  Acute appendicitis [K35.80]  Abnormal CT scan [R93.89]  Acute appendicitis, unspecified acute appendicitis type [K35.80]  Coronary artery disease involving native coronary artery of native heart without angina pectoris [I25.10]  Coronary artery disease involving native heart without angina pectoris, unspecified vessel or lesion type [I25.10]  Type 2 diabetes mellitus with diabetic polyneuropathy, without long-term current use of insulin (HCC) [E11.42]  Depression, unspecified depression type [F32. A]  Age/Sex: 68 y.o. male  Admission Orders:    Scheduled Medications:  ampicillin-sulbactam, 3 g, Intravenous, Q6H  docusate sodium, 100 mg, Oral, BID  insulin lispro, 1-5 Units, Subcutaneous, TID AC  insulin lispro, 1-5 Units, Subcutaneous, HS  nicotine, 1 patch, Transdermal, Daily  pantoprazole, 40 mg, Intravenous, Q24H FLACO  piperacillin-tazobactam (ZOSYN) 2.25 g in sodium chloride 0.9 % 50 mL IVPB  Dose: 2.25 g  Freq: Every 6 hours Route: IV  Last Dose: 2.25 g (08/31/23 6050)  Start: 08/29/23 2315 End: 08/31/23 0956    Continuous IV Infusions:  dextrose 5 % and sodium chloride 0.45 % with KCl 20 mEq/L, 125 mL/hr, Intravenous, Continuous      PRN Meds:  lactated ringers, 1,000 mL, Intravenous, Once PRN   And  lactated ringers, 1,000 mL, Intravenous, Once PRN  morphine injection, 2 mg, Intravenous, Q2H PRN 08/30 x 2, 08/31 x 3  ondansetron, 4 mg, Intravenous, Q6H PRN  oxyCODONE, 5 mg, Oral, Q6H PRN 08/30 x 1, 08/31 x 1  oxyCODONE, 2.5 mg, Oral, Q6H PRN  sodium chloride, 1,000 mL, Intravenous, Once PRN   And  sodium chloride, 1,000 mL, Intravenous, Once PRN        IP CONSULT TO INTERNAL MEDICINE  IP CONSULT TO CARDIOLOGY    Network Utilization Review Department  ATTENTION: Please call with any questions or concerns to [phone] and carefully listen to the prompts so that you are directed to the right person. All voicemails are confidential.  Thu Erinn all requests for admission clinical reviews, approved or denied determinations and any other requests to dedicated fax number below belonging to the campus where the patient is receiving treatment.  List of dedicated fax numbers for the Facilities:  [place] DENIALS (Administrative/Medical Necessity) [phone] [address] [facility] (Maternity/NICU/Pediatrics) [phone] [address] [place] [address] [facility] [address] [address] [facility] [address] [address] [address]  [phone]

## 2023-08-30 NOTE — PLAN OF CARE
Problem: Potential for Falls  Goal: Patient will remain free of falls  Description: INTERVENTIONS:  - Educate patient/family on patient safety including physical limitations  - Instruct patient to call for assistance with activity   - Consult OT/PT to assist with strengthening/mobility   - Keep Call bell within reach  - Keep bed low and locked with side rails adjusted as appropriate  - Keep care items and personal belongings within reach  - Initiate and maintain comfort rounds  - Make Fall Risk Sign visible to staff  - Offer Toileting every  Hours, in advance of need  - Initiate/Maintain alarm  - Obtain necessary fall risk management equipmen  - Apply yellow socks and bracelet for high fall risk patients  - Consider moving patient to room near nurses station  Outcome: Progressing     Problem: INFECTION - ADULT  Goal: Absence or prevention of progression during hospitalization  Description: INTERVENTIONS:  - Assess and monitor for signs and symptoms of infection  - Monitor lab/diagnostic results  - Monitor all insertion sites, i.e. indwelling lines, tubes, and drains  - Monitor endotracheal if appropriate and nasal secretions for changes in amount and color  - Presho appropriate cooling/warming therapies per order  - Administer medications as ordered  - Instruct and encourage patient and family to use good hand hygiene technique  - Identify and instruct in appropriate isolation precautions for identified infection/condition  Outcome: Progressing     Problem: SAFETY ADULT  Goal: Patient will remain free of falls  Description: INTERVENTIONS:  - Educate patient/family on patient safety including physical limitations  - Instruct patient to call for assistance with activity   - Consult OT/PT to assist with strengthening/mobility   - Keep Call bell within reach  - Keep bed low and locked with side rails adjusted as appropriate  - Keep care items and personal belongings within reach  - Initiate and maintain comfort rounds  - Make Fall Risk Sign visible to staff  - Offer Toileting every  Hours, in advance of need  - Initiate/Maintain alarm  - Obtain necessary fall risk management equipment:   - Apply yellow socks and bracelet for high fall risk patients  - Consider moving patient to room near nurses station  Outcome: Progressing

## 2023-08-30 NOTE — ASSESSMENT & PLAN NOTE
Being managed by primary team/surgery team  Underwent laparoscopic surgery today  Management by primary team

## 2023-08-30 NOTE — ANESTHESIA POSTPROCEDURE EVALUATION
Post-Op Assessment Note    CV Status:  Stable  Pain Score: 0    Pain management: adequate     Mental Status:  Alert and awake   Hydration Status:  Stable   PONV Controlled:  None   Airway Patency:  Patent and adequate      Post Op Vitals Reviewed: Yes      Staff: Anesthesiologist, CRNA         No notable events documented.     BP   168/70   Temp   98.3   Pulse  80   Resp   18   SpO2 97% RA

## 2023-08-30 NOTE — ED NOTES
Patient transported to [address] [place] [address] via [address] [place] [address], [address] [place] [address]  08/29/23 2114

## 2023-08-31 LAB
ALBUMIN SERPL BCP-MCNC: 3.4 G/DL (ref 3.5–5)
ALP SERPL-CCNC: 46 U/L (ref 34–104)
ALT SERPL W P-5'-P-CCNC: 9 U/L (ref 7–52)
ANION GAP SERPL CALCULATED.3IONS-SCNC: 6 MMOL/L
AST SERPL W P-5'-P-CCNC: 12 U/L (ref 13–39)
BASOPHILS # BLD AUTO: 0.02 THOUSANDS/ÂΜL (ref 0–0.1)
BASOPHILS NFR BLD AUTO: 0 % (ref 0–1)
BILIRUB SERPL-MCNC: 0.44 MG/DL (ref 0.2–1)
BUN SERPL-MCNC: 10 MG/DL (ref 5–25)
CALCIUM ALBUM COR SERPL-MCNC: 8.8 MG/DL (ref 8.3–10.1)
CALCIUM SERPL-MCNC: 8.3 MG/DL (ref 8.4–10.2)
CHLORIDE SERPL-SCNC: 106 MMOL/L (ref 96–108)
CO2 SERPL-SCNC: 23 MMOL/L (ref 21–32)
CREAT SERPL-MCNC: 1.3 MG/DL (ref 0.6–1.3)
EOSINOPHIL # BLD AUTO: 0.03 THOUSAND/ÂΜL (ref 0–0.61)
EOSINOPHIL NFR BLD AUTO: 0 % (ref 0–6)
ERYTHROCYTE [DISTWIDTH] IN BLOOD BY AUTOMATED COUNT: 15.9 % (ref 11.6–15.1)
EST. AVERAGE GLUCOSE BLD GHB EST-MCNC: 180 MG/DL
GFR SERPL CREATININE-BSD FRML MDRD: 52 ML/MIN/1.73SQ M
GLUCOSE SERPL-MCNC: 149 MG/DL (ref 65–140)
GLUCOSE SERPL-MCNC: 176 MG/DL (ref 65–140)
GLUCOSE SERPL-MCNC: 179 MG/DL (ref 65–140)
GLUCOSE SERPL-MCNC: 209 MG/DL (ref 65–140)
GLUCOSE SERPL-MCNC: 211 MG/DL (ref 65–140)
GLUCOSE SERPL-MCNC: 229 MG/DL (ref 65–140)
HBA1C MFR BLD: 7.9 %
HCT VFR BLD AUTO: 29 % (ref 36.5–49.3)
HGB BLD-MCNC: 9.4 G/DL (ref 12–17)
IMM GRANULOCYTES # BLD AUTO: 0.04 THOUSAND/UL (ref 0–0.2)
IMM GRANULOCYTES NFR BLD AUTO: 1 % (ref 0–2)
LYMPHOCYTES # BLD AUTO: 1.1 THOUSANDS/ÂΜL (ref 0.6–4.47)
LYMPHOCYTES NFR BLD AUTO: 13 % (ref 14–44)
MCH RBC QN AUTO: 31 PG (ref 26.8–34.3)
MCHC RBC AUTO-ENTMCNC: 32.4 G/DL (ref 31.4–37.4)
MCV RBC AUTO: 96 FL (ref 82–98)
MONOCYTES # BLD AUTO: 0.6 THOUSAND/ÂΜL (ref 0.17–1.22)
MONOCYTES NFR BLD AUTO: 7 % (ref 4–12)
NEUTROPHILS # BLD AUTO: 6.47 THOUSANDS/ÂΜL (ref 1.85–7.62)
NEUTS SEG NFR BLD AUTO: 79 % (ref 43–75)
NRBC BLD AUTO-RTO: 0 /100 WBCS
PLATELET # BLD AUTO: 323 THOUSANDS/UL (ref 149–390)
PMV BLD AUTO: 9.5 FL (ref 8.9–12.7)
POTASSIUM SERPL-SCNC: 4.2 MMOL/L (ref 3.5–5.3)
PROT SERPL-MCNC: 6.3 G/DL (ref 6.4–8.4)
RBC # BLD AUTO: 3.03 MILLION/UL (ref 3.88–5.62)
SODIUM SERPL-SCNC: 135 MMOL/L (ref 135–147)
WBC # BLD AUTO: 8.26 THOUSAND/UL (ref 4.31–10.16)

## 2023-08-31 PROCEDURE — C9113 INJ PANTOPRAZOLE SODIUM, VIA: HCPCS | Performed by: PHYSICIAN ASSISTANT

## 2023-08-31 PROCEDURE — 85025 COMPLETE CBC W/AUTO DIFF WBC: CPT | Performed by: PHYSICIAN ASSISTANT

## 2023-08-31 PROCEDURE — 99024 POSTOP FOLLOW-UP VISIT: CPT | Performed by: STUDENT IN AN ORGANIZED HEALTH CARE EDUCATION/TRAINING PROGRAM

## 2023-08-31 PROCEDURE — 80053 COMPREHEN METABOLIC PANEL: CPT | Performed by: PHYSICIAN ASSISTANT

## 2023-08-31 PROCEDURE — 99233 SBSQ HOSP IP/OBS HIGH 50: CPT | Performed by: INTERNAL MEDICINE

## 2023-08-31 PROCEDURE — 82948 REAGENT STRIP/BLOOD GLUCOSE: CPT

## 2023-08-31 PROCEDURE — 99232 SBSQ HOSP IP/OBS MODERATE 35: CPT | Performed by: INTERNAL MEDICINE

## 2023-08-31 PROCEDURE — 83036 HEMOGLOBIN GLYCOSYLATED A1C: CPT | Performed by: INTERNAL MEDICINE

## 2023-08-31 RX ORDER — HEPARIN SODIUM 5000 [USP'U]/ML
5000 INJECTION, SOLUTION INTRAVENOUS; SUBCUTANEOUS EVERY 8 HOURS SCHEDULED
Status: DISCONTINUED | OUTPATIENT
Start: 2023-08-31 | End: 2023-09-01

## 2023-08-31 RX ORDER — LANOLIN ALCOHOL/MO/W.PET/CERES
400 CREAM (GRAM) TOPICAL DAILY
Status: DISCONTINUED | OUTPATIENT
Start: 2023-09-01 | End: 2023-09-01 | Stop reason: HOSPADM

## 2023-08-31 RX ORDER — LANOLIN ALCOHOL/MO/W.PET/CERES
100 CREAM (GRAM) TOPICAL DAILY
Status: DISCONTINUED | OUTPATIENT
Start: 2023-09-01 | End: 2023-09-01 | Stop reason: HOSPADM

## 2023-08-31 RX ORDER — OXYCODONE HYDROCHLORIDE 5 MG/1
5 TABLET ORAL EVERY 4 HOURS PRN
Status: DISCONTINUED | OUTPATIENT
Start: 2023-08-31 | End: 2023-09-01 | Stop reason: HOSPADM

## 2023-08-31 RX ORDER — ACETAMINOPHEN 325 MG/1
650 TABLET ORAL EVERY 6 HOURS SCHEDULED
Status: DISCONTINUED | OUTPATIENT
Start: 2023-08-31 | End: 2023-09-01 | Stop reason: HOSPADM

## 2023-08-31 RX ADMIN — INSULIN LISPRO 1 UNITS: 100 INJECTION, SOLUTION INTRAVENOUS; SUBCUTANEOUS at 11:14

## 2023-08-31 RX ADMIN — AMPICILLIN SODIUM AND SULBACTAM SODIUM 3 G: 100; 50 INJECTION, POWDER, FOR SOLUTION INTRAVENOUS at 14:59

## 2023-08-31 RX ADMIN — MORPHINE SULFATE 2 MG: 2 INJECTION, SOLUTION INTRAMUSCULAR; INTRAVENOUS at 09:02

## 2023-08-31 RX ADMIN — OXYCODONE HYDROCHLORIDE 5 MG: 5 TABLET ORAL at 17:03

## 2023-08-31 RX ADMIN — MORPHINE SULFATE 2 MG: 2 INJECTION, SOLUTION INTRAMUSCULAR; INTRAVENOUS at 18:20

## 2023-08-31 RX ADMIN — DEXTROSE, SODIUM CHLORIDE, AND POTASSIUM CHLORIDE 125 ML/HR: 5; .45; .15 INJECTION INTRAVENOUS at 10:10

## 2023-08-31 RX ADMIN — PIPERACILLIN AND TAZOBACTAM 2.25 G: 2; .25 INJECTION, POWDER, LYOPHILIZED, FOR SOLUTION INTRAVENOUS at 09:18

## 2023-08-31 RX ADMIN — NICOTINE 1 PATCH: 7 PATCH, EXTENDED RELEASE TRANSDERMAL at 09:12

## 2023-08-31 RX ADMIN — HEPARIN SODIUM 5000 UNITS: 5000 INJECTION INTRAVENOUS; SUBCUTANEOUS at 16:06

## 2023-08-31 RX ADMIN — OXYCODONE HYDROCHLORIDE 5 MG: 5 TABLET ORAL at 11:13

## 2023-08-31 RX ADMIN — INSULIN LISPRO 1 UNITS: 100 INJECTION, SOLUTION INTRAVENOUS; SUBCUTANEOUS at 16:06

## 2023-08-31 RX ADMIN — AMPICILLIN SODIUM AND SULBACTAM SODIUM 3 G: 100; 50 INJECTION, POWDER, FOR SOLUTION INTRAVENOUS at 19:50

## 2023-08-31 RX ADMIN — HEPARIN SODIUM 5000 UNITS: 5000 INJECTION INTRAVENOUS; SUBCUTANEOUS at 21:10

## 2023-08-31 RX ADMIN — ACETAMINOPHEN 650 MG: 325 TABLET, FILM COATED ORAL at 17:03

## 2023-08-31 RX ADMIN — MORPHINE SULFATE 2 MG: 2 INJECTION, SOLUTION INTRAMUSCULAR; INTRAVENOUS at 05:45

## 2023-08-31 RX ADMIN — MORPHINE SULFATE 2 MG: 2 INJECTION, SOLUTION INTRAMUSCULAR; INTRAVENOUS at 14:17

## 2023-08-31 RX ADMIN — PIPERACILLIN AND TAZOBACTAM 2.25 G: 2; .25 INJECTION, POWDER, LYOPHILIZED, FOR SOLUTION INTRAVENOUS at 00:57

## 2023-08-31 RX ADMIN — INSULIN LISPRO 2 UNITS: 100 INJECTION, SOLUTION INTRAVENOUS; SUBCUTANEOUS at 07:00

## 2023-08-31 RX ADMIN — MORPHINE SULFATE 2 MG: 2 INJECTION, SOLUTION INTRAMUSCULAR; INTRAVENOUS at 23:28

## 2023-08-31 RX ADMIN — PANTOPRAZOLE SODIUM 40 MG: 40 INJECTION, POWDER, FOR SOLUTION INTRAVENOUS at 09:13

## 2023-08-31 RX ADMIN — OXYCODONE HYDROCHLORIDE 5 MG: 5 TABLET ORAL at 21:09

## 2023-08-31 RX ADMIN — DOCUSATE SODIUM 100 MG: 100 CAPSULE, LIQUID FILLED ORAL at 17:03

## 2023-08-31 RX ADMIN — ACETAMINOPHEN 650 MG: 325 TABLET, FILM COATED ORAL at 23:28

## 2023-08-31 RX ADMIN — MORPHINE SULFATE 2 MG: 2 INJECTION, SOLUTION INTRAMUSCULAR; INTRAVENOUS at 00:57

## 2023-08-31 RX ADMIN — DOCUSATE SODIUM 100 MG: 100 CAPSULE, LIQUID FILLED ORAL at 09:13

## 2023-08-31 NOTE — CASE MANAGEMENT
Case Management Discharge Planning Note    Patient name Meghan Gill  Location /-76 MRN [id number]  : 1945 Date 2023       Current Admission Date: 2023  Current Admission Diagnosis:Acute appendicitis   Patient Active Problem List    Diagnosis Date Noted   • Acute appendicitis 2023   • Alcohol abuse 2023   • Gastritis 2023   • Smoking 2023   • Altered mental status    • Toxic metabolic encephalopathy    • Lumbosacral spinal stenosis 11/10/2014   • Other cirrhosis of liver ([facility]) 03/15/2013   • Chronic hepatitis C ([facility]) 03/15/2013   • Essential hypertension 2013   • Alcoholism ([address] [place] [address]) 2012   • Depression 2012   • Arthritis 2012   • Type 2 diabetes mellitus with diabetic polyneuropathy, without long-term current use of insulin ([address] [place] [address]) 05/10/2012   • Coronary artery disease involving native coronary artery of native heart without angina pectoris 05/10/2012   • Hyperlipidemia 05/10/2012   • Peripheral vascular disease ([facility]) 05/10/2012      LOS (days): 2  Geometric Mean LOS (GMLOS) (days): 2.10  Days to GMLOS:0.5     OBJECTIVE:  Risk of Unplanned Readmission Score: 18.57         Current admission status: Inpatient   Preferred Pharmacy:   [address] [place] [address] #[id number] [address] [address]  Phone: [phone] Fax: [phone]    [address], [address] [place] [address] [address] [place] [address]  Phone: [phone] Fax: [phone]    Primary Care Provider: No primary care provider on file. Primary Insurance: [facility]  Secondary Insurance:     DISCHARGE DETAILS:     IMM Given (Date):: 23  IMM Given to[de-identified] Patient (CM reviewed IMM with patient at bedside. Patient reported understanding their rights and denied any questions or concerns.  Patient was given a copy and signed copy was placed in medical records.)

## 2023-08-31 NOTE — PLAN OF CARE

## 2023-08-31 NOTE — PLAN OF CARE
Problem: PAIN - ADULT  Goal: Verbalizes/displays adequate comfort level or baseline comfort level  Description: Interventions:  - Encourage patient to monitor pain and request assistance  - Assess pain using appropriate pain scale  - Administer analgesics based on type and severity of pain and evaluate response  - Implement non-pharmacological measures as appropriate and evaluate response  - Consider cultural and social influences on pain and pain management  - Notify physician/advanced practitioner if interventions unsuccessful or patient reports new pain  Outcome: Progressing     Problem: Potential for Falls  Goal: Patient will remain free of falls  Description: INTERVENTIONS:  - Educate patient/family on patient safety including physical limitations  - Instruct patient to call for assistance with activity   - Consult OT/PT to assist with strengthening/mobility   - Keep Call bell within reach  - Keep bed low and locked with side rails adjusted as appropriate  - Keep care items and personal belongings within reach  - Initiate and maintain comfort rounds  - Make Fall Risk Sign visible to staff  - Offer Toileting every 2 Hours, in advance of need  - Initiate/Maintain bed alarm  - Obtain necessary fall risk management equipment:   - Apply yellow socks and bracelet for high fall risk patients  - Consider moving patient to room near nurses station  Outcome: Progressing

## 2023-08-31 NOTE — PROGRESS NOTES
[address]  Progress Note  Name: Maria A Forde  MRN: [id number]  Unit/Bed#: -02 I Date of Admission: 8/29/2023   Date of Service: 8/31/2023 I Hospital Day: 2    Assessment/Plan   Depression  Assessment & Plan  · On Remeron    Peripheral vascular disease ([address])  Assessment & Plan  · On statins    Essential hypertension  Assessment & Plan  Lisinopril held preoperatively    Hyperlipidemia  Assessment & Plan  Can resume statin once cleared by surgical team for p.o. intake. Coronary artery disease involving native coronary artery of native heart without angina pectoris  Assessment & Plan  History of coronary artery disease status post PCI  Can resume dual antiplatelet therapy postop  Continue statins    Alcoholism ([address])  Assessment & Plan  · Will give thiamine and folic acid    Type 2 diabetes mellitus with diabetic polyneuropathy, without long-term current use of insulin [facility])  Assessment & Plan  Lab Results   Component Value Date    HGBA1C 7.9 (H) 08/31/2023       Recent Labs     08/30/23  1626 08/31/23  0717 08/31/23  1036 08/31/23  1606   POCGLU 165* 229* 209* 176*       Blood Sugar Average: Last 72 hrs:  (P) 191.6   Recommend repeat A1c  Sliding scale insulin for now  Hold home p.o. agents. * Acute appendicitis  Assessment & Plan  Being managed by primary team/surgery team  Underwent laparoscopic surgery on 8/30/23  Management by primary team           TE Pharmacologic Prophylaxis: Heparin    Patient Centered Rounds: I have performed bedside rounds with nursing staff today.   Discussions with Specialists or Other Care Team Provider: Surgery, cardiology  Education and Discussions with Family / Patient: Patient    Current Length of Stay: 2 day(s)  Current Patient Status: Inpatient     Certification Statement: The patient will continue to require additional inpatient hospital stay due to Acute appendicitis  Discharge Plan / Estimated Discharge Date: 1-2 more days    Code Status: Level 1 - Full Code  ______________________________________________________________________________    Subjective:   Patient seen and examined by me. Has severe abdominal pain and requests increasing frequency of his oxycodone. No fever or chills at this point of time. Having decreased appetite. No chest pain, palpitations or diaphoresis    Objective:   Vitals: Blood pressure 151/69, pulse 95, temperature 97.7 °F (36.5 °C), resp. rate 12, weight 66 kg (145 lb 8.1 oz), SpO2 97 %.     Physical Exam:   General appearance: alert, fatigued, appears stated age and cooperative  Constitutional- looks a little weak  HEENT - atraumatic and normocephalic  Neck- supple  Skin - no fresh rash  Extremities no fresh focal deformities  Cardiovascular- S1-S2 heard  Respiratory- bilateral air entry present, no crackles or rhonchi  Skin - no fresh rash  Abdomen - normal bowel sounds present, no rebound tenderness  CNS- No fresh focal deficits  Psych- no acute psychosis     Additional Data:   Labs:  Results from last 7 days   Lab Units 08/31/23  1034 08/30/23  0449 08/29/23  2116   WBC Thousand/uL 8.26 6.96 8.80   HEMOGLOBIN g/dL 9.4* 9.6* 10.3*   HEMATOCRIT % 29.0* 28.2* 30.4*   MCV fL 96 92 93   PLATELETS Thousands/uL 323 309 352     Results from last 7 days   Lab Units 08/31/23  1034 08/30/23  0449 08/29/23  2116   SODIUM mmol/L 135 138 137   POTASSIUM mmol/L 4.2 3.9 3.7   CHLORIDE mmol/L 106 108 105   CO2 mmol/L 23 25 22   ANION GAP mmol/L 6 5 10   BUN mg/dL 10 21 23   CREATININE mg/dL 1.30 1.33* 1.42*   CALCIUM mg/dL 8.3* 8.6 8.8   ALBUMIN g/dL 3.4* 3.3* 3.6   TOTAL BILIRUBIN mg/dL 0.44 0.40 0.33   ALK PHOS U/L 46 43 51   ALT U/L 9 12 14   AST U/L 12* 14 17   EGFR ml/min/1.73sq m 52 51 47   GLUCOSE RANDOM mg/dL 211* 185* 200*     Results from last 7 days   Lab Units 08/30/23  0449   MAGNESIUM mg/dL 1.4*   PHOSPHORUS mg/dL 3.5                  Results from last 7 days   Lab Units 08/31/23  1606 08/31/23  1036 08/31/23  0717 08/30/23  1626 08/30/23  1228   POC GLUCOSE mg/dl 176* 209* 229* 165* 179*     Results from last 7 days   Lab Units 08/31/23  0545   HEMOGLOBIN A1C % 7.9*         * I Have Reviewed All Lab Data Listed Above. Cultures:               Imaging:  Imaging Reports Reviewed Today Include:   CT abdomen pelvis wo contrast    Result Date: 8/29/2023  Impression: Acute appendicitis. No evidence of perforation or abscess within limitations of noncontrast exam. Surgical consult advised. Above findings discussed with Dr. Surendra Nguyen at 10:15 p.m. on 8/29/2023.  Workstation performed: [id number]     Scheduled Meds:  Current Facility-Administered Medications   Medication Dose Route Frequency Provider Last Rate   • acetaminophen  650 mg Oral Q6H [facility] Glo Kimble PA-C     • ampicillin-sulbactam  3 g Intravenous Q6H Donte Faustin PA-C 3 g (08/31/23 1459)   • dextrose 5 % and sodium chloride 0.45 % with KCl 20 mEq/L  125 mL/hr Intravenous Continuous Noreen Bullock PA-C 125 mL/hr (08/31/23 1010)   • docusate sodium  100 mg Oral BID Savita Moses PA-C     • [START ON 9/4/9897] folic acid  387 mcg Oral Daily Darcie Almazan MD     • heparin (porcine)  5,000 Units Subcutaneous Q8H [facility] Sera Faustin PA-C     • insulin lispro  1-5 Units Subcutaneous TID AC Marcos Nicolas MD     • insulin lispro  1-5 Units Subcutaneous HS Marcos Nicolas MD     • morphine injection  2 mg Intravenous Q2H PRN Savita Moses PA-C     • nicotine  1 patch Transdermal Daily Noreen Bullock PA-C     • ondansetron  4 mg Intravenous Q6H PRN Savita Moses PA-C     • oxyCODONE  5 mg Oral Q4H PRN Darcie Almazan MD     • oxyCODONE  2.5 mg Oral Q4H PRN Darcie Almazan MD     • pantoprazole  40 mg Intravenous Q24H [facility] Noreen Bullock PA-C     • [START ON 9/1/2023] thiamine  100 mg Oral Daily MD Darcie [facility]nelas MD [facility]    ** Please Note: This note has been constructed using a voice recognition system.  **

## 2023-08-31 NOTE — ASSESSMENT & PLAN NOTE
History of coronary artery disease status post PCI  Can resume dual antiplatelet therapy postop  Continue statins

## 2023-08-31 NOTE — PROGRESS NOTES
Cardiology Progress Note - Dereklon Holes 68 y.o. male MRN: [id number]    Unit/Bed#: -02 Encounter: [id number]      Assessment/Plan:  1. Preoperative cardiac risk assessment to undergo appendectomy. Patient is considered moderate risk from a cardiac standpoint. No overt contraindication from a cardiac standpoint. Advised to resume dual antiplatelet therapy when appropriate and reasonable from a surgical standpoint. 2.  Acute appendicitis, management per surgery    3. CAD status post multiple PCI's in the past.  Currently without chest pain. DAPT held preoperatively, advised to resume when okay by surgery. 4.  Hypertension, stable continue to monitor. Plan to resume lisinopril after surgery    5. HPL, on lipitor    6. Diabetes, management per Slim    7. PVD with history of lower extremity stents, DAPT on hold. Continue Lipitor    8. Hepatitis, management per GI    9. Tobacco and alcohol use, cessation advised    Patient is stable from a cardiac standpoint, will sign off. Call if needed. Subjective:   Patient seen and examined. No significant events overnight. Denies cp    Objective:     Vitals: Blood pressure 151/69, pulse 95, temperature 97.7 °F (36.5 °C), resp. rate 12, weight 66 kg (145 lb 8.1 oz), SpO2 97 %. , Body mass index is 24.21 kg/m².,   Orthostatic Blood Pressures    Flowsheet Row Most Recent Value   Blood Pressure 151/69 filed at 08/31/2023 1400   Patient Position - Orthostatic VS Lying filed at 08/30/2023 0022            Intake/Output Summary (Last 24 hours) at 8/31/2023 1543  Last data filed at 8/31/2023 1253  Gross per 24 hour   Intake 1740 ml   Output 530 ml   Net 1210 ml         Physical Exam:  GEN: Alert and oriented x 3, in no acute distress. Ill appearing and well nourished. HEENT: Sclera anicteric, conjunctivae pink, mucous membranes moist. Oropharynx clear. NECK: Supple, no carotid bruits, no significant JVD. Trachea midline, no thyromegaly.    HEART: Regular rhythm, normal S1 and S2, no murmurs, clicks, gallops or rubs. PMI nondisplaced, no thrills. LUNGS: Clear to auscultation bilaterally; no wheezes, rales, or rhonchi. No increased work of breathing or signs of respiratory distress. ABDOMEN: Soft, nondistended, normoactive bowel sounds. EXTREMITIES: Skin warm and well perfused, no clubbing, cyanosis, or edema. NEURO: No focal findings. Normal speech. Mood and affect normal.   SKIN: Normal without suspicious lesions on exposed skin.       Medications:      Current Facility-Administered Medications:   •  acetaminophen (TYLENOL) tablet 650 mg, 650 mg, Oral, Q6H [facility], Glo Kimble PA-C  •  ampicillin-sulbactam (UNASYN) 3 g in sodium chloride 0.9 % 100 mL IVPB, 3 g, Intravenous, Q6H, Sera Faustin PA-C, Last Rate: 200 mL/hr at 08/31/23 1459, 3 g at 08/31/23 1459  •  dextrose 5 % and sodium chloride 0.45 % with KCl 20 mEq/L infusion, 125 mL/hr, Intravenous, Continuous, Noreen Bullock PA-C, Last Rate: 125 mL/hr at 08/31/23 1010, 125 mL/hr at 08/31/23 1010  •  docusate sodium (COLACE) capsule 100 mg, 100 mg, Oral, BID, Noreen Bullock PA-C, 100 mg at 08/31/23 0913  •  heparin (porcine) subcutaneous injection 5,000 Units, 5,000 Units, Subcutaneous, Q8H [address], Sera Faustin PA-C  •  insulin lispro (HumaLOG) 100 units/mL subcutaneous injection 1-5 Units, 1-5 Units, Subcutaneous, TID AC, 1 Units at 08/31/23 1114 **AND** Fingerstick Glucose (POCT), , , TID ACMarcos MD  •  insulin lispro (HumaLOG) 100 units/mL subcutaneous injection 1-5 Units, 1-5 Units, Subcutaneous, HS, Marcos Nicolas MD  •  lactated ringers bolus 1,000 mL, 1,000 mL, Intravenous, Once PRN **AND** lactated ringers bolus 1,000 mL, 1,000 mL, Intravenous, Once PRN, Rolando Saldivar PA-C  •  morphine injection 2 mg, 2 mg, Intravenous, Q2H PRN, Noreen Bullock PA-C, 2 mg at 08/31/23 1417  •  nicotine (NICODERM CQ) 7 mg/24hr TD 24 hr patch 1 patch, 1 patch, Transdermal, Daily, Noreen Bullock PA-C, 1 patch at 08/31/23 0912  •  ondansetron (ZOFRAN) injection 4 mg, 4 mg, Intravenous, Q6H PRN, Savita Moses PA-C  •  oxyCODONE (ROXICODONE) IR tablet 5 mg, 5 mg, Oral, Q6H PRN, Noreen Bullock PA-C, 5 mg at 08/31/23 1113  •  oxyCODONE (ROXICODONE) split tablet 2.5 mg, 2.5 mg, Oral, Q6H PRN, Savita Moses PA-C  •  pantoprazole (PROTONIX) injection 40 mg, 40 mg, Intravenous, Q24H [address], Noreen Bullock PA-C, 40 mg at 08/31/23 0913  •  sodium chloride 0.9 % bolus 1,000 mL, 1,000 mL, Intravenous, Once PRN **AND** sodium chloride 0.9 % bolus 1,000 mL, 1,000 mL, Intravenous, Once PRN, Savita Moses PA-C     Labs & Results:        Results from last 7 days   Lab Units 08/31/23  1034 08/30/23  0449 08/29/23  2116   WBC Thousand/uL 8.26 6.96 8.80   HEMOGLOBIN g/dL 9.4* 9.6* 10.3*   HEMATOCRIT % 29.0* 28.2* 30.4*   PLATELETS Thousands/uL 323 309 352         Results from last 7 days   Lab Units 08/31/23  1034 08/30/23  0449 08/29/23  2116   POTASSIUM mmol/L 4.2 3.9 3.7   CHLORIDE mmol/L 106 108 105   CO2 mmol/L 23 25 22   BUN mg/dL 10 21 23   CREATININE mg/dL 1.30 1.33* 1.42*   CALCIUM mg/dL 8.3* 8.6 8.8   ALK PHOS U/L 46 43 51   ALT U/L 9 12 14   AST U/L 12* 14 17         Results from last 7 days   Lab Units 08/30/23  0449   MAGNESIUM mg/dL 1.4*

## 2023-08-31 NOTE — ASSESSMENT & PLAN NOTE
Being managed by primary team/surgery team  Underwent laparoscopic surgery on 8/30/23  Management by primary team

## 2023-08-31 NOTE — ASSESSMENT & PLAN NOTE
Lab Results   Component Value Date    HGBA1C 7.9 (H) 08/31/2023       Recent Labs     08/30/23  1626 08/31/23  0717 08/31/23  1036 08/31/23  1606   POCGLU 165* 229* 209* 176*       Blood Sugar Average: Last 72 hrs:  (P) 191.6   Recommend repeat A1c  Sliding scale insulin for now  Hold home p.o. agents.

## 2023-08-31 NOTE — PROGRESS NOTES
Progress Note -Surgery CORRIE Bunch 68 y.o. male MRN: [id number]  Unit/Bed#: -02 Encounter: [id number]      Assessment   67y M POD1, s/p laparoscopic appendectomy for acute appendicitis with perforation and necrosis    AVSS, WBC 8.26, Hb 9.4  UOP not recorded, Cr 1.33 from 1.42, patient reports voiding well  TONNY 80cc s/s  no flatus/BM, abdomen distended and tender    Plan   - continue with CLD as tolerated. If patient develops nausea, consider diet NPO  - analgesia prn  - Encourage Ambulation  - monitor abdominal exams  - monitor labs and vitals  - continue IV antibiotics  - SLIM consult for medical management, appreciate input  - DVT ppx  - Monitor TONNY drain  - Incentive spirometry  ______________________________________________________________________  Subjective:   Patient c/o abdominal pain. Denies n/v. Tolerating clears. No flatus. No belching. No subjective fevers. Objective:    Vitals:  /62   Pulse 83   Temp 97.9 °F (36.6 °C)   Resp 12   Wt 66 kg (145 lb 8.1 oz)   SpO2 96%   BMI 24.21 kg/m²     I/Os:  I/O last 3 completed shifts: In: 1370 [P.O.:120; IV Piggyback:1250]  Out: 80 [Drains:80]    No intake/output data recorded.     Invasive Devices     Peripheral Intravenous Line  Duration           Peripheral IV 08/29/23 Left Antecubital 1 day          Drain  Duration           Closed/Suction Drain Lateral LLQ Bulb 10 Fr. <1 day                Medications:  Current Facility-Administered Medications   Medication Dose Route Frequency   • dextrose 5 % and sodium chloride 0.45 % with KCl 20 mEq/L infusion  125 mL/hr Intravenous Continuous   • docusate sodium (COLACE) capsule 100 mg  100 mg Oral BID   • insulin lispro (HumaLOG) 100 units/mL subcutaneous injection 1-5 Units  1-5 Units Subcutaneous TID AC   • insulin lispro (HumaLOG) 100 units/mL subcutaneous injection 1-5 Units  1-5 Units Subcutaneous HS   • lactated ringers bolus 1,000 mL  1,000 mL Intravenous Once PRN    And   • lactated ringers bolus 1,000 mL  1,000 mL Intravenous Once PRN   • morphine injection 2 mg  2 mg Intravenous Q2H PRN   • nicotine (NICODERM CQ) 7 mg/24hr TD 24 hr patch 1 patch  1 patch Transdermal Daily   • ondansetron (ZOFRAN) injection 4 mg  4 mg Intravenous Q6H PRN   • oxyCODONE (ROXICODONE) IR tablet 5 mg  5 mg Oral Q6H PRN   • oxyCODONE (ROXICODONE) split tablet 2.5 mg  2.5 mg Oral Q6H PRN   • pantoprazole (PROTONIX) injection 40 mg  40 mg Intravenous Q24H [facility]   • piperacillin-tazobactam (ZOSYN) 2.25 g in sodium chloride 0.9 % 50 mL IVPB  2.25 g Intravenous Q6H   • sodium chloride 0.9 % bolus 1,000 mL  1,000 mL Intravenous Once PRN    And   • sodium chloride 0.9 % bolus 1,000 mL  1,000 mL Intravenous Once PRN                 Lab Results and Cultures:   CBC with diff:   Lab Results   Component Value Date    WBC 6.96 08/30/2023    HGB 9.6 (L) 08/30/2023    HCT 28.2 (L) 08/30/2023    MCV 92 08/30/2023     08/30/2023    RBC 3.06 (L) 08/30/2023    MCH 31.4 08/30/2023    MCHC 34.0 08/30/2023    RDW 15.5 (H) 08/30/2023    MPV 9.3 08/30/2023    NRBC 0 08/30/2023       BMP/CMP:  Lab Results   Component Value Date    K 3.9 08/30/2023     08/30/2023    CO2 25 08/30/2023    BUN 21 08/30/2023    CREATININE 1.33 (H) 08/30/2023    CALCIUM 8.6 08/30/2023    AST 14 08/30/2023    ALT 12 08/30/2023    ALKPHOS 43 08/30/2023    EGFR 51 08/30/2023       Lipid Panel:   No results found for: "CHOL"    Coags:   Lab Results   Component Value Date    PTT 24 10/17/2019    INR 1.11 10/17/2019        Urinalysis:   Lab Results   Component Value Date    COLORU Light Yellow 08/29/2023    CLARITYU Clear 08/29/2023    SPECGRAV 1.023 08/29/2023    PHUR 6.5 08/29/2023    LEUKOCYTESUR (A) 08/29/2023     Elevated glucose may cause decreased leukocyte values.  See urine microscopic for UWBC result    NITRITE Negative 08/29/2023    GLUCOSEU >=1000 (1%) (A) 08/29/2023    KETONESU Negative 08/29/2023    BILIRUBINUR Negative 08/29/2023    BLOODU Negative 08/29/2023        Physical Exam:  General Appearance:    Alert and orientated x 3, cooperative, no distress, appears stated age   Lungs:     Clear to auscultation bilaterally, respirations unlabored    Heart:    Regular rate and rhythm, S1 and S2 normal   Abdomen:    hypoactive BS, moderate distension, generalized tenderness, non rigid, no masses, no palpated organomegaly  Wound/Dressing:  C/d/i, no drainage, no erythema, mild fluctuance between umbilicus and TONNY drain. TONNY s/s   Extremities:  Extremities normal, no calf tenderness, no cyanosis or edema   Pulses:   2+ and symmetric all extremities   Skin:   Skin color, texture, turgor normal, no rashes, no jaundice   Neurologic:   CNII-XII intact, normal strength, affect appropriate       Imaging:  CT abdomen pelvis wo contrast    Result Date: 8/29/2023  Impression: Acute appendicitis. No evidence of perforation or abscess within limitations of noncontrast exam. Surgical consult advised. Above findings discussed with Dr. Siobhan Leger at 10:15 p.m. on 8/29/2023.  Workstation performed: [id number]       VTE Pharmacologic Prophylaxis: {Pharmacologic VTE Prophylaxis:324883241}  VTE Mechanical Prophylaxis: {Mechanical VTE Prophylaxis:24996}    Zaria Turner PA-C   8/31/2023

## 2023-09-01 ENCOUNTER — HOSPITAL ENCOUNTER (INPATIENT)
Facility: HOSPITAL | Age: 78
LOS: 2 days | Discharge: HOME | End: 2023-09-03
Attending: EMERGENCY MEDICINE | Admitting: STUDENT IN AN ORGANIZED HEALTH CARE EDUCATION/TRAINING PROGRAM
Payer: COMMERCIAL

## 2023-09-01 VITALS
DIASTOLIC BLOOD PRESSURE: 80 MMHG | WEIGHT: 145.5 LBS | TEMPERATURE: 97.4 F | HEART RATE: 120 BPM | RESPIRATION RATE: 12 BRPM | OXYGEN SATURATION: 97 % | BODY MASS INDEX: 24.24 KG/M2 | SYSTOLIC BLOOD PRESSURE: 160 MMHG | HEIGHT: 65 IN

## 2023-09-01 DIAGNOSIS — F41.9 ANXIETY: ICD-10-CM

## 2023-09-01 DIAGNOSIS — R10.9 ABDOMINAL PAIN: ICD-10-CM

## 2023-09-01 DIAGNOSIS — Z90.49 STATUS POST APPENDECTOMY: ICD-10-CM

## 2023-09-01 DIAGNOSIS — F17.200 SMOKING: ICD-10-CM

## 2023-09-01 DIAGNOSIS — I10 ESSENTIAL HYPERTENSION: ICD-10-CM

## 2023-09-01 DIAGNOSIS — K74.69 OTHER CIRRHOSIS OF LIVER (HCC): ICD-10-CM

## 2023-09-01 DIAGNOSIS — I25.10 CORONARY ARTERY DISEASE INVOLVING NATIVE CORONARY ARTERY OF NATIVE HEART WITHOUT ANGINA PECTORIS: ICD-10-CM

## 2023-09-01 DIAGNOSIS — E11.42 TYPE 2 DIABETES MELLITUS WITH DIABETIC POLYNEUROPATHY, WITHOUT LONG-TERM CURRENT USE OF INSULIN (HCC): ICD-10-CM

## 2023-09-01 DIAGNOSIS — E78.2 MIXED HYPERLIPIDEMIA: ICD-10-CM

## 2023-09-01 DIAGNOSIS — F10.20 ALCOHOLISM (HCC): ICD-10-CM

## 2023-09-01 DIAGNOSIS — K35.890 OTHER ACUTE APPENDICITIS: Primary | ICD-10-CM

## 2023-09-01 DIAGNOSIS — F32.A DEPRESSION, UNSPECIFIED DEPRESSION TYPE: ICD-10-CM

## 2023-09-01 LAB
ANION GAP SERPL CALCULATED.3IONS-SCNC: 7 MMOL/L
BASOPHILS # BLD AUTO: 0.01 THOUSANDS/ÂΜL (ref 0–0.1)
BASOPHILS # BLD AUTO: 0.02 THOUSANDS/ÂΜL (ref 0–0.1)
BASOPHILS NFR BLD AUTO: 0 % (ref 0–1)
BASOPHILS NFR BLD AUTO: 0 % (ref 0–1)
BUN SERPL-MCNC: 7 MG/DL (ref 5–25)
CALCIUM SERPL-MCNC: 8.9 MG/DL (ref 8.4–10.2)
CHLORIDE SERPL-SCNC: 107 MMOL/L (ref 96–108)
CO2 SERPL-SCNC: 23 MMOL/L (ref 21–32)
CREAT SERPL-MCNC: 1.19 MG/DL (ref 0.6–1.3)
EOSINOPHIL # BLD AUTO: 0.05 THOUSAND/ÂΜL (ref 0–0.61)
EOSINOPHIL # BLD AUTO: 0.05 THOUSAND/ÂΜL (ref 0–0.61)
EOSINOPHIL NFR BLD AUTO: 1 % (ref 0–6)
EOSINOPHIL NFR BLD AUTO: 1 % (ref 0–6)
ERYTHROCYTE [DISTWIDTH] IN BLOOD BY AUTOMATED COUNT: 15.4 % (ref 11.6–15.1)
ERYTHROCYTE [DISTWIDTH] IN BLOOD BY AUTOMATED COUNT: 15.6 % (ref 11.6–15.1)
GFR SERPL CREATININE-BSD FRML MDRD: 58 ML/MIN/1.73SQ M
GLUCOSE SERPL-MCNC: 162 MG/DL (ref 65–140)
GLUCOSE SERPL-MCNC: 212 MG/DL (ref 65–140)
GLUCOSE SERPL-MCNC: 213 MG/DL (ref 65–140)
GLUCOSE SERPL-MCNC: 216 MG/DL (ref 65–140)
GLUCOSE SERPL-MCNC: 97 MG/DL (ref 65–140)
HCT VFR BLD AUTO: 30.2 % (ref 36.5–49.3)
HCT VFR BLD AUTO: 30.6 % (ref 36.5–49.3)
HGB BLD-MCNC: 10.1 G/DL (ref 12–17)
HGB BLD-MCNC: 9.8 G/DL (ref 12–17)
IMM GRANULOCYTES # BLD AUTO: 0.02 THOUSAND/UL (ref 0–0.2)
IMM GRANULOCYTES # BLD AUTO: 0.03 THOUSAND/UL (ref 0–0.2)
IMM GRANULOCYTES NFR BLD AUTO: 0 % (ref 0–2)
IMM GRANULOCYTES NFR BLD AUTO: 0 % (ref 0–2)
LACTATE SERPL-SCNC: 1.3 MMOL/L (ref 0.5–2)
LYMPHOCYTES # BLD AUTO: 0.95 THOUSANDS/ÂΜL (ref 0.6–4.47)
LYMPHOCYTES # BLD AUTO: 1.46 THOUSANDS/ÂΜL (ref 0.6–4.47)
LYMPHOCYTES NFR BLD AUTO: 11 % (ref 14–44)
LYMPHOCYTES NFR BLD AUTO: 21 % (ref 14–44)
MCH RBC QN AUTO: 30.6 PG (ref 26.8–34.3)
MCH RBC QN AUTO: 30.7 PG (ref 26.8–34.3)
MCHC RBC AUTO-ENTMCNC: 32.5 G/DL (ref 31.4–37.4)
MCHC RBC AUTO-ENTMCNC: 33 G/DL (ref 31.4–37.4)
MCV RBC AUTO: 93 FL (ref 82–98)
MCV RBC AUTO: 94 FL (ref 82–98)
MONOCYTES # BLD AUTO: 0.54 THOUSAND/ÂΜL (ref 0.17–1.22)
MONOCYTES # BLD AUTO: 0.6 THOUSAND/ÂΜL (ref 0.17–1.22)
MONOCYTES NFR BLD AUTO: 7 % (ref 4–12)
MONOCYTES NFR BLD AUTO: 8 % (ref 4–12)
NEUTROPHILS # BLD AUTO: 4.93 THOUSANDS/ÂΜL (ref 1.85–7.62)
NEUTROPHILS # BLD AUTO: 6.81 THOUSANDS/ÂΜL (ref 1.85–7.62)
NEUTS SEG NFR BLD AUTO: 70 % (ref 43–75)
NEUTS SEG NFR BLD AUTO: 81 % (ref 43–75)
NRBC BLD AUTO-RTO: 0 /100 WBCS
NRBC BLD AUTO-RTO: 0 /100 WBCS
PLATELET # BLD AUTO: 357 THOUSANDS/UL (ref 149–390)
PLATELET # BLD AUTO: 364 THOUSANDS/UL (ref 149–390)
PMV BLD AUTO: 9.5 FL (ref 8.9–12.7)
PMV BLD AUTO: 9.7 FL (ref 8.9–12.7)
POTASSIUM SERPL-SCNC: 4 MMOL/L (ref 3.5–5.3)
RBC # BLD AUTO: 3.2 MILLION/UL (ref 3.88–5.62)
RBC # BLD AUTO: 3.29 MILLION/UL (ref 3.88–5.62)
SODIUM SERPL-SCNC: 137 MMOL/L (ref 135–147)
WBC # BLD AUTO: 7.03 THOUSAND/UL (ref 4.31–10.16)
WBC # BLD AUTO: 8.44 THOUSAND/UL (ref 4.31–10.16)

## 2023-09-01 PROCEDURE — 99024 POSTOP FOLLOW-UP VISIT: CPT | Performed by: PHYSICIAN ASSISTANT

## 2023-09-01 PROCEDURE — 99284 EMERGENCY DEPT VISIT MOD MDM: CPT

## 2023-09-01 PROCEDURE — 99285 EMERGENCY DEPT VISIT HI MDM: CPT | Performed by: EMERGENCY MEDICINE

## 2023-09-01 PROCEDURE — 82948 REAGENT STRIP/BLOOD GLUCOSE: CPT

## 2023-09-01 PROCEDURE — 36415 COLL VENOUS BLD VENIPUNCTURE: CPT | Performed by: EMERGENCY MEDICINE

## 2023-09-01 PROCEDURE — 99232 SBSQ HOSP IP/OBS MODERATE 35: CPT | Performed by: INTERNAL MEDICINE

## 2023-09-01 PROCEDURE — 83605 ASSAY OF LACTIC ACID: CPT | Performed by: EMERGENCY MEDICINE

## 2023-09-01 PROCEDURE — 80048 BASIC METABOLIC PNL TOTAL CA: CPT | Performed by: PHYSICIAN ASSISTANT

## 2023-09-01 PROCEDURE — 85025 COMPLETE CBC W/AUTO DIFF WBC: CPT | Performed by: PHYSICIAN ASSISTANT

## 2023-09-01 PROCEDURE — 85025 COMPLETE CBC W/AUTO DIFF WBC: CPT | Performed by: EMERGENCY MEDICINE

## 2023-09-01 PROCEDURE — C9113 INJ PANTOPRAZOLE SODIUM, VIA: HCPCS | Performed by: PHYSICIAN ASSISTANT

## 2023-09-01 PROCEDURE — 99024 POSTOP FOLLOW-UP VISIT: CPT | Performed by: STUDENT IN AN ORGANIZED HEALTH CARE EDUCATION/TRAINING PROGRAM

## 2023-09-01 RX ORDER — LORAZEPAM 2 MG/ML
1 INJECTION INTRAMUSCULAR ONCE
Status: COMPLETED | OUTPATIENT
Start: 2023-09-01 | End: 2023-09-01

## 2023-09-01 RX ORDER — INSULIN LISPRO 100 [IU]/ML
1-5 INJECTION, SOLUTION INTRAVENOUS; SUBCUTANEOUS
Status: DISCONTINUED | OUTPATIENT
Start: 2023-09-01 | End: 2023-09-03 | Stop reason: HOSPADM

## 2023-09-01 RX ORDER — SODIUM CHLORIDE 9 MG/ML
50 INJECTION, SOLUTION INTRAVENOUS CONTINUOUS
Status: DISCONTINUED | OUTPATIENT
Start: 2023-09-01 | End: 2023-09-02

## 2023-09-01 RX ORDER — LANOLIN ALCOHOL/MO/W.PET/CERES
100 CREAM (GRAM) TOPICAL DAILY
Status: DISCONTINUED | OUTPATIENT
Start: 2023-09-02 | End: 2023-09-03 | Stop reason: HOSPADM

## 2023-09-01 RX ORDER — LANOLIN ALCOHOL/MO/W.PET/CERES
400 CREAM (GRAM) TOPICAL DAILY
Status: DISCONTINUED | OUTPATIENT
Start: 2023-09-02 | End: 2023-09-03 | Stop reason: HOSPADM

## 2023-09-01 RX ORDER — INSULIN LISPRO 100 [IU]/ML
1-5 INJECTION, SOLUTION INTRAVENOUS; SUBCUTANEOUS
Status: DISCONTINUED | OUTPATIENT
Start: 2023-09-02 | End: 2023-09-03 | Stop reason: HOSPADM

## 2023-09-01 RX ORDER — HYDROXYZINE HYDROCHLORIDE 25 MG/1
25 TABLET, FILM COATED ORAL DAILY PRN
Status: DISCONTINUED | OUTPATIENT
Start: 2023-09-02 | End: 2023-09-02

## 2023-09-01 RX ORDER — LANOLIN ALCOHOL/MO/W.PET/CERES
6 CREAM (GRAM) TOPICAL
Status: DISCONTINUED | OUTPATIENT
Start: 2023-09-01 | End: 2023-09-01

## 2023-09-01 RX ORDER — HYDROXYZINE HYDROCHLORIDE 25 MG/1
25 TABLET, FILM COATED ORAL ONCE
Status: DISCONTINUED | OUTPATIENT
Start: 2023-09-01 | End: 2023-09-01 | Stop reason: HOSPADM

## 2023-09-01 RX ORDER — ACETAMINOPHEN 325 MG/1
650 TABLET ORAL EVERY 6 HOURS PRN
Status: DISCONTINUED | OUTPATIENT
Start: 2023-09-01 | End: 2023-09-03 | Stop reason: HOSPADM

## 2023-09-01 RX ORDER — HEPARIN SODIUM 5000 [USP'U]/ML
5000 INJECTION, SOLUTION INTRAVENOUS; SUBCUTANEOUS EVERY 8 HOURS SCHEDULED
Status: DISCONTINUED | OUTPATIENT
Start: 2023-09-02 | End: 2023-09-03 | Stop reason: HOSPADM

## 2023-09-01 RX ORDER — TRAMADOL HYDROCHLORIDE 50 MG/1
50 TABLET ORAL EVERY 6 HOURS PRN
Status: DISCONTINUED | OUTPATIENT
Start: 2023-09-01 | End: 2023-09-03 | Stop reason: HOSPADM

## 2023-09-01 RX ORDER — DOCUSATE SODIUM 100 MG/1
100 CAPSULE, LIQUID FILLED ORAL 2 TIMES DAILY
Status: DISCONTINUED | OUTPATIENT
Start: 2023-09-02 | End: 2023-09-03 | Stop reason: HOSPADM

## 2023-09-01 RX ORDER — CLOPIDOGREL BISULFATE 75 MG/1
75 TABLET ORAL DAILY
Status: DISCONTINUED | OUTPATIENT
Start: 2023-09-02 | End: 2023-09-03 | Stop reason: HOSPADM

## 2023-09-01 RX ORDER — MIRTAZAPINE 15 MG/1
15 TABLET, FILM COATED ORAL
Status: DISCONTINUED | OUTPATIENT
Start: 2023-09-01 | End: 2023-09-03 | Stop reason: HOSPADM

## 2023-09-01 RX ORDER — PANTOPRAZOLE SODIUM 40 MG/10ML
40 INJECTION, POWDER, LYOPHILIZED, FOR SOLUTION INTRAVENOUS
Status: DISCONTINUED | OUTPATIENT
Start: 2023-09-02 | End: 2023-09-03 | Stop reason: HOSPADM

## 2023-09-01 RX ORDER — ONDANSETRON 2 MG/ML
4 INJECTION INTRAMUSCULAR; INTRAVENOUS EVERY 6 HOURS PRN
Status: DISCONTINUED | OUTPATIENT
Start: 2023-09-01 | End: 2023-09-03 | Stop reason: HOSPADM

## 2023-09-01 RX ORDER — ONDANSETRON 2 MG/ML
4 INJECTION INTRAMUSCULAR; INTRAVENOUS EVERY 6 HOURS PRN
Status: DISCONTINUED | OUTPATIENT
Start: 2023-09-01 | End: 2023-09-01

## 2023-09-01 RX ORDER — CLOPIDOGREL BISULFATE 75 MG/1
75 TABLET ORAL DAILY
Status: DISCONTINUED | OUTPATIENT
Start: 2023-09-01 | End: 2023-09-01 | Stop reason: HOSPADM

## 2023-09-01 RX ORDER — OXYCODONE HYDROCHLORIDE 5 MG/1
5 TABLET ORAL EVERY 6 HOURS PRN
Status: DISCONTINUED | OUTPATIENT
Start: 2023-09-01 | End: 2023-09-03 | Stop reason: HOSPADM

## 2023-09-01 RX ORDER — ATORVASTATIN CALCIUM 20 MG/1
20 TABLET, FILM COATED ORAL DAILY
Status: DISCONTINUED | OUTPATIENT
Start: 2023-09-02 | End: 2023-09-03 | Stop reason: HOSPADM

## 2023-09-01 RX ORDER — FENTANYL CITRATE 50 UG/ML
50 INJECTION, SOLUTION INTRAMUSCULAR; INTRAVENOUS ONCE
Status: COMPLETED | OUTPATIENT
Start: 2023-09-01 | End: 2023-09-01

## 2023-09-01 RX ORDER — DEXTROSE MONOHYDRATE, SODIUM CHLORIDE, AND POTASSIUM CHLORIDE 50; 1.49; 4.5 G/1000ML; G/1000ML; G/1000ML
100 INJECTION, SOLUTION INTRAVENOUS CONTINUOUS
Status: DISCONTINUED | OUTPATIENT
Start: 2023-09-01 | End: 2023-09-01 | Stop reason: HOSPADM

## 2023-09-01 RX ADMIN — HEPARIN SODIUM 5000 UNITS: 5000 INJECTION INTRAVENOUS; SUBCUTANEOUS at 05:18

## 2023-09-01 RX ADMIN — INSULIN LISPRO 2 UNITS: 100 INJECTION, SOLUTION INTRAVENOUS; SUBCUTANEOUS at 11:35

## 2023-09-01 RX ADMIN — THIAMINE HCL TAB 100 MG 100 MG: 100 TAB at 08:54

## 2023-09-01 RX ADMIN — ASPIRIN 81 MG: 81 TABLET, COATED ORAL at 13:09

## 2023-09-01 RX ADMIN — DOCUSATE SODIUM 100 MG: 100 CAPSULE, LIQUID FILLED ORAL at 08:54

## 2023-09-01 RX ADMIN — LORAZEPAM 1 MG: 2 INJECTION INTRAMUSCULAR; INTRAVENOUS at 22:21

## 2023-09-01 RX ADMIN — DOCUSATE SODIUM 100 MG: 100 CAPSULE, LIQUID FILLED ORAL at 17:34

## 2023-09-01 RX ADMIN — Medication 400 MCG: at 08:54

## 2023-09-01 RX ADMIN — OXYCODONE HYDROCHLORIDE 5 MG: 5 TABLET ORAL at 11:34

## 2023-09-01 RX ADMIN — ACETAMINOPHEN 650 MG: 325 TABLET, FILM COATED ORAL at 05:18

## 2023-09-01 RX ADMIN — OXYCODONE HYDROCHLORIDE 5 MG: 5 TABLET ORAL at 17:33

## 2023-09-01 RX ADMIN — ACETAMINOPHEN 650 MG: 325 TABLET, FILM COATED ORAL at 17:34

## 2023-09-01 RX ADMIN — CLOPIDOGREL BISULFATE 75 MG: 75 TABLET ORAL at 13:09

## 2023-09-01 RX ADMIN — MORPHINE SULFATE 2 MG: 2 INJECTION, SOLUTION INTRAMUSCULAR; INTRAVENOUS at 09:42

## 2023-09-01 RX ADMIN — ONDANSETRON 4 MG: 2 INJECTION INTRAMUSCULAR; INTRAVENOUS at 18:18

## 2023-09-01 RX ADMIN — AMPICILLIN SODIUM AND SULBACTAM SODIUM 3 G: 100; 50 INJECTION, POWDER, FOR SOLUTION INTRAVENOUS at 08:46

## 2023-09-01 RX ADMIN — SODIUM CHLORIDE 1000 ML: 0.9 INJECTION, SOLUTION INTRAVENOUS at 22:21

## 2023-09-01 RX ADMIN — AMPICILLIN SODIUM AND SULBACTAM SODIUM 3 G: 100; 50 INJECTION, POWDER, FOR SOLUTION INTRAVENOUS at 01:23

## 2023-09-01 RX ADMIN — PANTOPRAZOLE SODIUM 40 MG: 40 INJECTION, POWDER, FOR SOLUTION INTRAVENOUS at 09:31

## 2023-09-01 RX ADMIN — ACETAMINOPHEN 650 MG: 325 TABLET, FILM COATED ORAL at 11:34

## 2023-09-01 RX ADMIN — NICOTINE 1 PATCH: 7 PATCH, EXTENDED RELEASE TRANSDERMAL at 08:52

## 2023-09-01 RX ADMIN — MIRTAZAPINE 15 MG: 15 TABLET, FILM COATED ORAL at 23:42

## 2023-09-01 RX ADMIN — AMPICILLIN SODIUM AND SULBACTAM SODIUM 3 G: 100; 50 INJECTION, POWDER, FOR SOLUTION INTRAVENOUS at 13:09

## 2023-09-01 RX ADMIN — FENTANYL CITRATE 50 MCG: 50 INJECTION INTRAMUSCULAR; INTRAVENOUS at 22:21

## 2023-09-01 RX ADMIN — DEXTROSE, SODIUM CHLORIDE, AND POTASSIUM CHLORIDE 125 ML/HR: 5; .45; .15 INJECTION INTRAVENOUS at 09:32

## 2023-09-01 RX ADMIN — AMPICILLIN SODIUM AND SULBACTAM SODIUM 3 G: 100; 50 INJECTION, POWDER, FOR SOLUTION INTRAVENOUS at 23:33

## 2023-09-01 RX ADMIN — DEXTROSE, SODIUM CHLORIDE, AND POTASSIUM CHLORIDE 100 ML/HR: 5; .45; .15 INJECTION INTRAVENOUS at 18:53

## 2023-09-01 RX ADMIN — OXYCODONE HYDROCHLORIDE 5 MG: 5 TABLET ORAL at 03:11

## 2023-09-01 RX ADMIN — SODIUM CHLORIDE 50 ML/HR: 0.9 INJECTION, SOLUTION INTRAVENOUS at 23:34

## 2023-09-01 RX ADMIN — INSULIN LISPRO 2 UNITS: 100 INJECTION, SOLUTION INTRAVENOUS; SUBCUTANEOUS at 08:53

## 2023-09-01 RX ADMIN — INSULIN LISPRO 1 UNITS: 100 INJECTION, SOLUTION INTRAVENOUS; SUBCUTANEOUS at 17:37

## 2023-09-01 NOTE — PROGRESS NOTES
[address]  Progress Note  Name: Howard Cavazos  MRN: [id number]  Unit/Bed#: -53 I Date of Admission: 8/29/2023   Date of Service: 9/1/2023 I Hospital Day: 3    Assessment/Plan   Depression  Assessment & Plan  · On Remeron. Peripheral vascular disease ([address])  Assessment & Plan  · On statins. Essential hypertension  Assessment & Plan  Lisinopril held preoperatively. Coronary artery disease involving native coronary artery of native heart without angina pectoris  Assessment & Plan  History of coronary artery disease status post PCI  Can resume dual antiplatelet therapy postop  Continue statins. Alcoholism ([address])  Assessment & Plan  · Will give thiamine and folic acid. Type 2 diabetes mellitus with diabetic polyneuropathy, without long-term current use of insulin [facility])  Assessment & Plan  Lab Results   Component Value Date    HGBA1C 7.9 (H) 08/31/2023       Recent Labs     08/31/23  2134 09/01/23  0730 09/01/23  1122 09/01/23  1638   POCGLU 149* 213* 216* 162*       Blood Sugar Average: Last 72 hrs:  (P) [id number]   Recommend repeat A1c, Sliding scale insulin for now  Hold home oral agents  Reasonably well controlled postoperative    * Acute appendicitis  Assessment & Plan  Being managed by primary team/surgery team  Underwent laparoscopic surgery on 8/30/23  Pain management in conjunction with surgery. Oxycodone ordered           TE Pharmacologic Prophylaxis: Patient ambulatory    Patient Centered Rounds: I have performed bedside rounds with nursing staff today.   Discussions with Specialists or Other Care Team Provider: Surgery  Education and Discussions with Family / Patient: Patient    Current Length of Stay: 3 day(s)  Current Patient Status: Inpatient     Certification Statement: The patient will continue to require additional inpatient hospital stay due to Acute appendicitis  Discharge Plan / Estimated Discharge Date: 1 to 2 days    Code Status: Level 1 - A/O. VSS on RA. Rash on torso/BUE improving. Denies Pain/SOB/CP. Up independently. On Vanco. Continue to monitor.    Full Code  ______________________________________________________________________________    Subjective:   Patient seen and examined by me. No chest pain, palpitations or diaphoresis. No fever or chills. Patient states that the abdominal pain is much better at this point of time compared to yesterday    Objective:   Vitals: Blood pressure 155/81, pulse 92, temperature 98 °F (36.7 °C), resp. rate 12, weight 66 kg (145 lb 8.1 oz), SpO2 97 %.     Physical Exam:   General appearance: alert, fatigued, appears stated age and cooperative  Constitutional- looks a little weak  HEENT - atraumatic and normocephalic  Neck- supple  Skin - no fresh rash  Extremities no fresh focal deformities  Cardiovascular- S1-S2 heard  Respiratory- bilateral air entry present, no crackles or rhonchi  Skin - no fresh rash  Abdomen - normal bowel sounds present, no rebound tenderness  CNS- No fresh focal deficits  Psych- no acute psychosis     Additional Data:   Labs:  Results from last 7 days   Lab Units 09/01/23  0554 08/31/23  1034 08/30/23  0449 08/29/23  2116   WBC Thousand/uL 7.03 8.26 6.96 8.80   HEMOGLOBIN g/dL 9.8* 9.4* 9.6* 10.3*   HEMATOCRIT % 30.2* 29.0* 28.2* 30.4*   MCV fL 94 96 92 93   PLATELETS Thousands/uL 357 323 309 352     Results from last 7 days   Lab Units 09/01/23  0554 08/31/23  1034 08/30/23  0449 08/29/23  2116   SODIUM mmol/L 137 135 138 137   POTASSIUM mmol/L 4.0 4.2 3.9 3.7   CHLORIDE mmol/L 107 106 108 105   CO2 mmol/L 23 23 25 22   ANION GAP mmol/L 7 6 5 10   BUN mg/dL 7 10 21 23   CREATININE mg/dL 1.19 1.30 1.33* 1.42*   CALCIUM mg/dL 8.9 8.3* 8.6 8.8   ALBUMIN g/dL  --  3.4* 3.3* 3.6   TOTAL BILIRUBIN mg/dL  --  0.44 0.40 0.33   ALK PHOS U/L  --  46 43 51   ALT U/L  --  9 12 14   AST U/L  --  12* 14 17   EGFR ml/min/1.73sq m 58 52 51 47   GLUCOSE RANDOM mg/dL 212* 211* 185* 200*     Results from last 7 days   Lab Units 08/30/23  0449   MAGNESIUM mg/dL 1.4*   PHOSPHORUS mg/dL 3.5                  Results from last 7 days   Lab Units 09/01/23  1638 09/01/23  1122 09/01/23  0730 08/31/23  2134 08/31/23  1606 08/31/23  1036 08/31/23  0717 08/30/23  1626 08/30/23  1228   POC GLUCOSE mg/dl 162* 216* 213* 149* 176* 209* 229* 165* 179*     Results from last 7 days   Lab Units 08/31/23  0545   HEMOGLOBIN A1C % 7.9*         * I Have Reviewed All Lab Data Listed Above. Cultures:               Imaging:  Imaging Reports Reviewed Today Include:   CT abdomen pelvis wo contrast    Result Date: 8/29/2023  Impression: Acute appendicitis. No evidence of perforation or abscess within limitations of noncontrast exam. Surgical consult advised. Above findings discussed with Dr. Marva Huntley at 10:15 p.m. on 8/29/2023.  Workstation performed: [id number]     Scheduled Meds:  Current Facility-Administered Medications   Medication Dose Route Frequency Provider Last Rate   • acetaminophen  650 mg Oral Q6H [facility] half-way Glo Kimble PA-C     • ampicillin-sulbactam  3 g Intravenous Q6H Estefany Faustin PA-C 3 g (09/01/23 1309)   • aspirin  81 mg Oral Daily Noreen Bullock PA-C     • clopidogrel  75 mg Oral Daily Noreen Bullock PA-C     • dextrose 5 % and sodium chloride 0.45 % with KCl 20 mEq/L  100 mL/hr Intravenous Continuous Arpan Champion PA-C     • docusate sodium  100 mg Oral BID Silas Reyes PA-C     • folic acid  174 mcg Oral Daily Maggie Echevarria MD     • insulin lispro  1-5 Units Subcutaneous TID AC Marcos Nicolas MD     • insulin lispro  1-5 Units Subcutaneous HS Marcos Nicolas MD     • morphine injection  2 mg Intravenous Q2H PRN Silas Reyes PA-C     • nicotine  1 patch Transdermal Daily Noreen Bullock PA-C     • ondansetron  4 mg Intravenous Q6H PRN Silas Reyes PA-C     • oxyCODONE  5 mg Oral Q4H PRN Maggie Echevarria MD     • oxyCODONE  2.5 mg Oral Q4H PRN Maggie Echevarria MD     • pantoprazole  40 mg Intravenous Q24H [facility] half-way Noreen Bullock PA-C     • thiamine  100 mg Oral Daily Maggie Echevarria MD         [facility] Erika Garner, [address] [facility]    ** Please Note: This note has been constructed using a voice recognition system.  **

## 2023-09-01 NOTE — PLAN OF CARE
Problem: PAIN - ADULT  Goal: Verbalizes/displays adequate comfort level or baseline comfort level  Description: Interventions:  - Encourage patient to monitor pain and request assistance  - Assess pain using appropriate pain scale  - Administer analgesics based on type and severity of pain and evaluate response  - Implement non-pharmacological measures as appropriate and evaluate response  - Consider cultural and social influences on pain and pain management  - Notify physician/advanced practitioner if interventions unsuccessful or patient reports new pain  Outcome: Progressing     Problem: INFECTION - ADULT  Goal: Absence or prevention of progression during hospitalization  Description: INTERVENTIONS:  - Assess and monitor for signs and symptoms of infection  - Monitor lab/diagnostic results  - Monitor all insertion sites, i.e. indwelling lines, tubes, and drains  - Monitor endotracheal if appropriate and nasal secretions for changes in amount and color  - [place] appropriate cooling/warming therapies per order  - Administer medications as ordered  - Instruct and encourage patient and family to use good hand hygiene technique  - Identify and instruct in appropriate isolation precautions for identified infection/condition  Outcome: Progressing  Goal: Absence of fever/infection during neutropenic period  Description: INTERVENTIONS:  - Monitor WBC    Outcome: Progressing     Problem: Nutrition/Hydration-ADULT  Goal: Nutrient/Hydration intake appropriate for improving, restoring or maintaining nutritional needs  Description: Monitor and assess patient's nutrition/hydration status for malnutrition. Collaborate with interdisciplinary team and initiate plan and interventions as ordered. Monitor patient's weight and dietary intake as ordered or per policy. Utilize nutrition screening tool and intervene as necessary. Determine patient's food preferences and provide high-protein, high-caloric foods as appropriate. INTERVENTIONS:  - Monitor oral intake, urinary output, labs, and treatment plans  - Assess nutrition and hydration status and recommend course of action  - Evaluate amount of meals eaten  - Assist patient with eating if necessary   - Allow adequate time for meals  - Recommend/ encourage appropriate diets, oral nutritional supplements, and vitamin/mineral supplements  - Order, calculate, and assess calorie counts as needed  - Recommend, monitor, and adjust tube feedings and TPN/PPN based on assessed needs  - Assess need for intravenous fluids  - Provide specific nutrition/hydration education as appropriate  - Include patient/family/caregiver in decisions related to nutrition  Outcome: Progressing

## 2023-09-01 NOTE — ASSESSMENT & PLAN NOTE
History of coronary artery disease status post PCI  Can resume dual antiplatelet therapy postop  Continue statins.

## 2023-09-01 NOTE — CASE MANAGEMENT
Case Management Discharge Planning Note    Patient name Javad Barton  Location /-78 MRN [id number]  : 1945 Date 2023       Current Admission Date: 2023  Current Admission Diagnosis:Acute appendicitis   Patient Active Problem List    Diagnosis Date Noted   • Acute appendicitis 2023   • Alcohol abuse 2023   • Gastritis 2023   • Smoking 2023   • Altered mental status    • Toxic metabolic encephalopathy    • Lumbosacral spinal stenosis 11/10/2014   • Other cirrhosis of liver ([facility]) 03/15/2013   • Chronic hepatitis C ([facility]) 03/15/2013   • Essential hypertension 2013   • Alcoholism ([facility]) 2012   • Depression 2012   • Arthritis 2012   • Type 2 diabetes mellitus with diabetic polyneuropathy, without long-term current use of insulin ([facility]) 05/10/2012   • Coronary artery disease involving native coronary artery of native heart without angina pectoris 05/10/2012   • Hyperlipidemia 05/10/2012   • Peripheral vascular disease ([facility]) 05/10/2012      LOS (days): 3  Geometric Mean LOS (GMLOS) (days): 1.60  Days to GMLOS:-1     OBJECTIVE:  Risk of Unplanned Readmission Score: 18.09         Current admission status: Inpatient   Preferred Pharmacy:   [address] [place] [address] #[id number] [address]  Phone: [phone] Fax: [phone]    [address], [address] [place] [address] [address] [place] [address]  Phone: [phone] Fax: [phone]    Primary Care Provider: No primary care provider on file. Primary Insurance: [facility]  Secondary Insurance:     DISCHARGE DETAILS:     CM continuing to follow for any needs that require CM involvement.

## 2023-09-01 NOTE — ASSESSMENT & PLAN NOTE
Being managed by primary team/surgery team  Underwent laparoscopic surgery on 8/30/23  Pain management in conjunction with surgery.   Oxycodone ordered

## 2023-09-01 NOTE — PROGRESS NOTES
Progress Note - General Surgery   Lori Sizer 68 y.o. male MRN: [id number]  Unit/Bed#: -02 Encounter: [id number]    Assessment/Plan  POD 2 Laparoscopic Appendectomy for perforated appendicitis  with abscess. TONNY drain to bulb suction  KADE on admission, now resolved  H/o Alcohol Gastritis   H/o CAD on ASA and Plavix   Cirrhosis of the liver,   Alcohol Abuse, danielle use since discharge 8/20/23     DM II  Depression  HTN  HLD       Pain is controlled with pain meds, he is ambulating and using IS,   Small amount +flatus today, no bowel movement,   Leukocytosis has resolved,  Hbg stable 9.8  VSS  TONNY drain 50 ml, s/s  UO 1475 with bladder spasm    -cont antibiotics  -cont TONNY drain  -cont ambulation  -cont clear liquids until he has further BF  -ok to restart Plavix and ASA d/c heparin         Chief Complaint: I feel pretty good, pain is not to bad, I am walking  I think I passed a small amount of gas today, no bowel movement. I get a strong urge to urinate, it can be painful. Objective/Exam: Blood pressure 127/74, pulse 98, temperature 97.7 °F (36.5 °C), resp. rate 12, weight 66 kg (145 lb 8.1 oz), SpO2 96 %. Wound Culure: No results found for: "WOUNDCULT"      General Appearance:    Alert and orientated x 3, cooperative, no distress   Lungs:     Clear to auscultation bilaterally, respirations unlabored    Heart:    Regular rate and rhythm   Abdomen:     Soft, incisional dressings c/d/i. TONNY drain with S/S drainage  No bowel sounds.           Extremities:   Extremities normal,  no cyanosis or edema   Pulses:   2+ and symmetric all extremities, no calf tenderness   Skin:   Skin color, texture, turgor normal, no rashes or lesions   Neurologic:   CNII-XII intact, normal strength, sensation and reflexes     Throughout, affect appropriate       ,      Intake/Output Summary (Last 24 hours) at 9/1/2023 1231  Last data filed at 9/1/2023 0932  Gross per 24 hour   Intake 1350 ml   Output 1325 ml   Net 25 ml Invasive Devices     Peripheral Intravenous Line  Duration           Peripheral IV 08/29/23 Left Antecubital 2 days          Drain  Duration           Closed/Suction Drain Lateral LLQ Bulb 10 Fr. 1 day                                        Labs: CBC with diff: @RESUFAST(WBC,HGB,HCT,MCV,PLT,ADJUSTEDWBC,   RBC,MCH,MCHC,RDW,MPV,NRBC,TOTALCELLSCOUNTED,SEGS%,GRANS%,LYMPHS%,EOS%,BASO%,ABNEUT,ABGRANS,ABLYMPHS,ABMOMOS,ABEOS,ABBASO)@,   BMP/CMP:  Lab Results   Component Value Date    K 4.0 09/01/2023     09/01/2023    CO2 23 09/01/2023    BUN 7 09/01/2023    CREATININE 1.19 09/01/2023    CALCIUM 8.9 09/01/2023    AST 12 (L) 08/31/2023    ALT 9 08/31/2023    ALKPHOS 46 08/31/2023    EGFR 58 09/01/2023   ,   Lipid Panel: No results found for: "CHOL",   Coags:   Lab Results   Component Value Date    PTT 24 10/17/2019    INR 1.11 10/17/2019   ,     Blood Culture: No results found for: "Glory Mcardle",   Urinalysis:   Lab Results   Component Value Date    COLORU Light Yellow 08/29/2023    CLARITYU Clear 08/29/2023    SPECGRAV 1.023 08/29/2023    PHUR 6.5 08/29/2023    LEUKOCYTESUR (A) 08/29/2023     Elevated glucose may cause decreased leukocyte values. See urine microscopic for UWBC result    NITRITE Negative 08/29/2023    GLUCOSEU >=1000 (1%) (A) 08/29/2023    KETONESU Negative 08/29/2023    BILIRUBINUR Negative 08/29/2023    BLOODU Negative 08/29/2023   ,   Urine Culture: No results found for: "URINECX",         Imaging: CT abdomen pelvis wo contrast    Result Date: 8/29/2023  Impression: Acute appendicitis. No evidence of perforation or abscess within limitations of noncontrast exam. Surgical consult advised. Above findings discussed with Dr. Michell Landau at 10:15 p.m. on 8/29/2023.  Workstation performed: [id number]         Bartolo Salas PA-C  9/1/2023

## 2023-09-01 NOTE — PLAN OF CARE
Problem: Potential for Falls  Goal: Patient will remain free of falls  Description: INTERVENTIONS:  - Educate patient/family on patient safety including physical limitations  - Instruct patient to call for assistance with activity   - Consult OT/PT to assist with strengthening/mobility   - Keep Call bell within reach  - Keep bed low and locked with side rails adjusted as appropriate  - Keep care items and personal belongings within reach  - Initiate and maintain comfort rounds  - Make Fall Risk Sign visible to staff  - Offer Toileting every 2 Hours, in advance of need  - Initiate/Maintain bed alarm  - Obtain necessary fall risk management equipment: bed alarm  - Apply yellow socks and bracelet for high fall risk patients  - Consider moving patient to room near nurses station  Outcome: Progressing     Problem: PAIN - ADULT  Goal: Verbalizes/displays adequate comfort level or baseline comfort level  Description: Interventions:  - Encourage patient to monitor pain and request assistance  - Assess pain using appropriate pain scale  - Administer analgesics based on type and severity of pain and evaluate response  - Implement non-pharmacological measures as appropriate and evaluate response  - Consider cultural and social influences on pain and pain management  - Notify physician/advanced practitioner if interventions unsuccessful or patient reports new pain  Outcome: Progressing     Problem: INFECTION - ADULT  Goal: Absence or prevention of progression during hospitalization  Description: INTERVENTIONS:  - Assess and monitor for signs and symptoms of infection  - Monitor lab/diagnostic results  - Monitor all insertion sites, i.e. indwelling lines, tubes, and drains  - Monitor endotracheal if appropriate and nasal secretions for changes in amount and color  - [place] appropriate cooling/warming therapies per order  - Administer medications as ordered  - Instruct and encourage patient and family to use good hand hygiene technique  - Identify and instruct in appropriate isolation precautions for identified infection/condition  Outcome: Progressing  Goal: Absence of fever/infection during neutropenic period  Description: INTERVENTIONS:  - Monitor WBC    Outcome: Progressing     Problem: SAFETY ADULT  Goal: Patient will remain free of falls  Description: INTERVENTIONS:  - Educate patient/family on patient safety including physical limitations  - Instruct patient to call for assistance with activity   - Consult OT/PT to assist with strengthening/mobility   - Keep Call bell within reach  - Keep bed low and locked with side rails adjusted as appropriate  - Keep care items and personal belongings within reach  - Initiate and maintain comfort rounds  - Make Fall Risk Sign visible to staff  - Offer Toileting every 2 Hours, in advance of need  - Initiate/Maintain bed alarm  - Obtain necessary fall risk management equipment: bed alarm  - Apply yellow socks and bracelet for high fall risk patients  - Consider moving patient to room near nurses station  Outcome: Progressing  Goal: Maintain or return to baseline ADL function  Description: INTERVENTIONS:  -  Assess patient's ability to carry out ADLs; assess patient's baseline for ADL function and identify physical deficits which impact ability to perform ADLs (bathing, care of mouth/teeth, toileting, grooming, dressing, etc.)  - Assess/evaluate cause of self-care deficits   - Assess range of motion  - Assess patient's mobility; develop plan if impaired  - Assess patient's need for assistive devices and provide as appropriate  - Encourage maximum independence but intervene and supervise when necessary  - Involve family in performance of ADLs  - Assess for home care needs following discharge   - Consider OT consult to assist with ADL evaluation and planning for discharge  - Provide patient education as appropriate  Outcome: Progressing  Goal: Maintains/Returns to pre admission functional level  Description: INTERVENTIONS:  - Perform BMAT or MOVE assessment daily.   - Set and communicate daily mobility goal to care team and patient/family/caregiver. - Collaborate with rehabilitation services on mobility goals if consulted  - Perform Range of Motion 4 times a day. - Reposition patient every 2 hours. - Dangle patient 3 times a day  - Stand patient 3 times a day  - Ambulate patient 3 times a day  - Out of bed to chair 3 times a day   - Out of bed for meals 3 times a day  - Out of bed for toileting  - Record patient progress and toleration of activity level   Outcome: Progressing     Problem: DISCHARGE PLANNING  Goal: Discharge to home or other facility with appropriate resources  Description: INTERVENTIONS:  - Identify barriers to discharge w/patient and caregiver  - Arrange for needed discharge resources and transportation as appropriate  - Identify discharge learning needs (meds, wound care, etc.)  - Arrange for interpretive services to assist at discharge as needed  - Refer to Case Management Department for coordinating discharge planning if the patient needs post-hospital services based on physician/advanced practitioner order or complex needs related to functional status, cognitive ability, or social support system  Outcome: Progressing     Problem: Knowledge Deficit  Goal: Patient/family/caregiver demonstrates understanding of disease process, treatment plan, medications, and discharge instructions  Description: Complete learning assessment and assess knowledge base. Interventions:  - Provide teaching at level of understanding  - Provide teaching via preferred learning methods  Outcome: Progressing     Problem: Nutrition/Hydration-ADULT  Goal: Nutrient/Hydration intake appropriate for improving, restoring or maintaining nutritional needs  Description: Monitor and assess patient's nutrition/hydration status for malnutrition.  Collaborate with interdisciplinary team and initiate plan and interventions as ordered. Monitor patient's weight and dietary intake as ordered or per policy. Utilize nutrition screening tool and intervene as necessary. Determine patient's food preferences and provide high-protein, high-caloric foods as appropriate. INTERVENTIONS:  - Monitor oral intake, urinary output, labs, and treatment plans  - Assess nutrition and hydration status and recommend course of action  - Evaluate amount of meals eaten  - Assist patient with eating if necessary   - Allow adequate time for meals  - Recommend/ encourage appropriate diets, oral nutritional supplements, and vitamin/mineral supplements  - Order, calculate, and assess calorie counts as needed  - Recommend, monitor, and adjust tube feedings and TPN/PPN based on assessed needs  - Assess need for intravenous fluids  - Provide specific nutrition/hydration education as appropriate  - Include patient/family/caregiver in decisions related to nutrition  Outcome: Progressing     Problem: MOBILITY - ADULT  Goal: Maintain or return to baseline ADL function  Description: INTERVENTIONS:  -  Assess patient's ability to carry out ADLs; assess patient's baseline for ADL function and identify physical deficits which impact ability to perform ADLs (bathing, care of mouth/teeth, toileting, grooming, dressing, etc.)  - Assess/evaluate cause of self-care deficits   - Assess range of motion  - Assess patient's mobility; develop plan if impaired  - Assess patient's need for assistive devices and provide as appropriate  - Encourage maximum independence but intervene and supervise when necessary  - Involve family in performance of ADLs  - Assess for home care needs following discharge   - Consider OT consult to assist with ADL evaluation and planning for discharge  - Provide patient education as appropriate  Outcome: Progressing  Goal: Maintains/Returns to pre admission functional level  Description: INTERVENTIONS:  - Perform BMAT or MOVE assessment daily. - Set and communicate daily mobility goal to care team and patient/family/caregiver. - Collaborate with rehabilitation services on mobility goals if consulted  - Perform Range of Motion 4 times a day. - Reposition patient every 2 hours.   - Dangle patient 3 times a day  - Stand patient 3 times a day  - Ambulate patient 3 times a day  - Out of bed to chair 3 times a day   - Out of bed for meals 3 times a day  - Out of bed for toileting  - Record patient progress and toleration of activity level   Outcome: Progressing

## 2023-09-01 NOTE — ASSESSMENT & PLAN NOTE
Lab Results   Component Value Date    HGBA1C 7.9 (H) 08/31/2023       Recent Labs     08/31/23  2134 09/01/23  0730 09/01/23  1122 09/01/23  1638   POCGLU 149* 213* 216* 162*       Blood Sugar Average: Last 72 hrs:  (P) [id number]   Recommend repeat A1c, Sliding scale insulin for now  Hold home oral agents  Reasonably well controlled postoperative

## 2023-09-01 NOTE — DISCHARGE INSTR - AVS FIRST PAGE
Follow up: Following discharge from the hospital call the office in 1-2 days to set up a post operative appointment to be seen in 2 weeks. [phone]  Call the office if you have increased pain not relieved with pain medicine. Call the office if you have a fever,redness, the wound opens up, you have pus draining from your incision. AFTER YOU LEAVE: Following discharge from the hospital, you may have some questions about your procedure, your activities or your general condition. These instructions may answer some of your questions and help you adjust during the first few days following your operation. You can expect to be sore and tender mostly around the incisions. This pain should last approximally 5 days and gradually improve daily. Incisions: You may apply ice to the incisions to help with pain. It is normal to have some bruising, swelling or mild discoloration around the incision. If increasing redness or pain develops, call our office immediately. Do not apply any creams, lotions, or ointments. If you have dressings: You may remove the gauze dressing from your incisions 48 hours after surgery. Underneath this dressing is a tape like dressing called Steri Strips. Leave the steri strips in place for 5-7 days. They may fall off on their own. This is okay. Bathing: You may shower daily with soap and water the day after the procedure. It is OK to GENTLY wash the incision with soap and water then pat dry. DO NOT SCRUB. Do NOT soak incision in a tub, pool, or hot tub for 2 weeks. Diet:   Resume your normal diet unless specified otherwise. We recommend you slowly advance your diet. Try to start with softer bland foods and gradually advance as tolerated. Be sure to consume plenty of water. Avoid alcohol. Activity/Restrictions: The evening following the procedure you should rest as much as possible, sitting, lying or reclining.   you should be sure someone remains with you until the next morning. Gradually increase your activity daily. Walking 3-4 times daily is good and stairs are ok. Listen to your body. If you start to get tired or sore then rest.   No strenuous activity or exercise for 3-4 weeks. No heavy lifting, pushing or pulling. No driving for 5 days or while taking narcotics for pain. Return or work: You may return to work or other activities as soon as your pain is controlled and you feel comfortable. For many people, this is 5 to 7 days after surgery. If your job requires heavy lifting you will need to be on light duty for 2-3 weeks. Medication:   If you were given a prescription for Percocet, Norco, or Vicodin for pain be sure to eat prior to taking as these medications as they may cause nausea and vomiting on an empty stomach. If you do not want to take stronger medications for pain, you may take Tylenol, Aleve OR Ibuprofen  DO NOT take Tylenol  (acetaminophen) with these medication for a fever or for further pain control as these medications already contain Tylenol in them. Take one or the other. Do not exceed more than 4000 mg of acetaminophen in 24 hours or 3000 mg if you have liver disease. If you were given an antibiotic take it until it is finished. Constipation:   Patients often experience constipation after surgery. You may take a stool softener (Colace) to help prevent constipation. If you experience significant nausea or vomiting after abdominal surgery, call the office before trying any stronger medications or laxatives  Call the office if you haven't had a Bowel movement in 2-3days after surgery    Contact your healthcare provider if:   You have a fever over 101°F (38°C) or chills. You have pain or nausea that is not relieved by medicine. You have redness and swelling around your incisions, or blood or pus is leaking from your incisions. You are constipated or have diarrhea.     Your skin or eyes are yellow, or your bowel movements are pale. You have questions or concerns about your surgery, condition, or care. Seek care immediately or call 911 if:   You cannot stop vomiting. Your bowel movements are black or bloody. You have pain in your abdomen and it is swollen or hard. Your arm or leg feels warm, tender, and painful. It may look swollen and red. You feel lightheaded, short of breath, and have chest pain. You cough up blood.

## 2023-09-02 LAB
ALBUMIN SERPL BCP-MCNC: 3.5 G/DL (ref 3.5–5)
ALP SERPL-CCNC: 51 U/L (ref 34–104)
ALT SERPL W P-5'-P-CCNC: 9 U/L (ref 7–52)
ANION GAP SERPL CALCULATED.3IONS-SCNC: 5 MMOL/L
AST SERPL W P-5'-P-CCNC: 16 U/L (ref 13–39)
BASOPHILS # BLD MANUAL: 0 THOUSAND/UL (ref 0–0.1)
BASOPHILS NFR MAR MANUAL: 0 % (ref 0–1)
BILIRUB SERPL-MCNC: 0.51 MG/DL (ref 0.2–1)
BUN SERPL-MCNC: 5 MG/DL (ref 5–25)
CALCIUM SERPL-MCNC: 9 MG/DL (ref 8.4–10.2)
CHLORIDE SERPL-SCNC: 109 MMOL/L (ref 96–108)
CO2 SERPL-SCNC: 25 MMOL/L (ref 21–32)
CREAT SERPL-MCNC: 1.15 MG/DL (ref 0.6–1.3)
EOSINOPHIL # BLD MANUAL: 0.12 THOUSAND/UL (ref 0–0.4)
EOSINOPHIL NFR BLD MANUAL: 2 % (ref 0–6)
ERYTHROCYTE [DISTWIDTH] IN BLOOD BY AUTOMATED COUNT: 15.4 % (ref 11.6–15.1)
GFR SERPL CREATININE-BSD FRML MDRD: 61 ML/MIN/1.73SQ M
GLUCOSE SERPL-MCNC: 131 MG/DL (ref 65–140)
GLUCOSE SERPL-MCNC: 140 MG/DL (ref 65–140)
GLUCOSE SERPL-MCNC: 145 MG/DL (ref 65–140)
GLUCOSE SERPL-MCNC: 160 MG/DL (ref 65–140)
GLUCOSE SERPL-MCNC: 233 MG/DL (ref 65–140)
HCT VFR BLD AUTO: 28.1 % (ref 36.5–49.3)
HGB BLD-MCNC: 9.4 G/DL (ref 12–17)
HYPERCHROMIA BLD QL SMEAR: NORMAL
LYMPHOCYTES # BLD AUTO: 1.28 THOUSAND/UL (ref 0.6–4.47)
LYMPHOCYTES # BLD AUTO: 22 % (ref 14–44)
MAGNESIUM SERPL-MCNC: 1.5 MG/DL (ref 1.9–2.7)
MCH RBC QN AUTO: 30.9 PG (ref 26.8–34.3)
MCHC RBC AUTO-ENTMCNC: 33.5 G/DL (ref 31.4–37.4)
MCV RBC AUTO: 92 FL (ref 82–98)
MONOCYTES # BLD AUTO: 0.35 THOUSAND/UL (ref 0–1.22)
MONOCYTES NFR BLD: 6 % (ref 4–12)
NEUTROPHILS # BLD MANUAL: 4.09 THOUSAND/UL (ref 1.85–7.62)
NEUTS SEG NFR BLD AUTO: 70 % (ref 43–75)
PHOSPHATE SERPL-MCNC: 3.3 MG/DL (ref 2.3–4.1)
PLATELET # BLD AUTO: 346 THOUSANDS/UL (ref 149–390)
PLATELET BLD QL SMEAR: ADEQUATE
PMV BLD AUTO: 8.9 FL (ref 8.9–12.7)
POLYCHROMASIA BLD QL SMEAR: NORMAL
POTASSIUM SERPL-SCNC: 4.1 MMOL/L (ref 3.5–5.3)
PROT SERPL-MCNC: 6.8 G/DL (ref 6.4–8.4)
RBC # BLD AUTO: 3.04 MILLION/UL (ref 3.88–5.62)
SODIUM SERPL-SCNC: 139 MMOL/L (ref 135–147)
WBC # BLD AUTO: 5.84 THOUSAND/UL (ref 4.31–10.16)

## 2023-09-02 PROCEDURE — 82948 REAGENT STRIP/BLOOD GLUCOSE: CPT

## 2023-09-02 PROCEDURE — 83735 ASSAY OF MAGNESIUM: CPT | Performed by: STUDENT IN AN ORGANIZED HEALTH CARE EDUCATION/TRAINING PROGRAM

## 2023-09-02 PROCEDURE — 84100 ASSAY OF PHOSPHORUS: CPT | Performed by: STUDENT IN AN ORGANIZED HEALTH CARE EDUCATION/TRAINING PROGRAM

## 2023-09-02 PROCEDURE — 80053 COMPREHEN METABOLIC PANEL: CPT | Performed by: STUDENT IN AN ORGANIZED HEALTH CARE EDUCATION/TRAINING PROGRAM

## 2023-09-02 PROCEDURE — C9113 INJ PANTOPRAZOLE SODIUM, VIA: HCPCS | Performed by: PHYSICIAN ASSISTANT

## 2023-09-02 PROCEDURE — 85007 BL SMEAR W/DIFF WBC COUNT: CPT | Performed by: STUDENT IN AN ORGANIZED HEALTH CARE EDUCATION/TRAINING PROGRAM

## 2023-09-02 PROCEDURE — 99232 SBSQ HOSP IP/OBS MODERATE 35: CPT | Performed by: INTERNAL MEDICINE

## 2023-09-02 PROCEDURE — 99024 POSTOP FOLLOW-UP VISIT: CPT | Performed by: STUDENT IN AN ORGANIZED HEALTH CARE EDUCATION/TRAINING PROGRAM

## 2023-09-02 PROCEDURE — 85027 COMPLETE CBC AUTOMATED: CPT | Performed by: STUDENT IN AN ORGANIZED HEALTH CARE EDUCATION/TRAINING PROGRAM

## 2023-09-02 RX ORDER — HYDROXYZINE HYDROCHLORIDE 25 MG/1
25 TABLET, FILM COATED ORAL 3 TIMES DAILY PRN
Status: DISCONTINUED | OUTPATIENT
Start: 2023-09-02 | End: 2023-09-03 | Stop reason: HOSPADM

## 2023-09-02 RX ADMIN — Medication 400 MCG: at 08:29

## 2023-09-02 RX ADMIN — ASPIRIN 81 MG: 81 TABLET, COATED ORAL at 08:29

## 2023-09-02 RX ADMIN — CLOPIDOGREL BISULFATE 75 MG: 75 TABLET ORAL at 08:29

## 2023-09-02 RX ADMIN — HYDROXYZINE HYDROCHLORIDE 25 MG: 25 TABLET ORAL at 14:59

## 2023-09-02 RX ADMIN — INSULIN LISPRO 1 UNITS: 100 INJECTION, SOLUTION INTRAVENOUS; SUBCUTANEOUS at 22:47

## 2023-09-02 RX ADMIN — HEPARIN SODIUM 5000 UNITS: 5000 INJECTION INTRAVENOUS; SUBCUTANEOUS at 22:46

## 2023-09-02 RX ADMIN — AMPICILLIN SODIUM AND SULBACTAM SODIUM 3 G: 100; 50 INJECTION, POWDER, FOR SOLUTION INTRAVENOUS at 22:45

## 2023-09-02 RX ADMIN — PANTOPRAZOLE SODIUM 40 MG: 40 INJECTION, POWDER, FOR SOLUTION INTRAVENOUS at 09:07

## 2023-09-02 RX ADMIN — OXYCODONE HYDROCHLORIDE 5 MG: 5 TABLET ORAL at 09:07

## 2023-09-02 RX ADMIN — INSULIN LISPRO 2 UNITS: 100 INJECTION, SOLUTION INTRAVENOUS; SUBCUTANEOUS at 15:30

## 2023-09-02 RX ADMIN — DOCUSATE SODIUM 100 MG: 100 CAPSULE, LIQUID FILLED ORAL at 08:29

## 2023-09-02 RX ADMIN — TRAMADOL HYDROCHLORIDE 50 MG: 50 TABLET, COATED ORAL at 02:27

## 2023-09-02 RX ADMIN — AMPICILLIN SODIUM AND SULBACTAM SODIUM 3 G: 100; 50 INJECTION, POWDER, FOR SOLUTION INTRAVENOUS at 10:55

## 2023-09-02 RX ADMIN — MIRTAZAPINE 15 MG: 15 TABLET, FILM COATED ORAL at 22:48

## 2023-09-02 RX ADMIN — AMPICILLIN SODIUM AND SULBACTAM SODIUM 3 G: 100; 50 INJECTION, POWDER, FOR SOLUTION INTRAVENOUS at 15:48

## 2023-09-02 RX ADMIN — NICOTINE 1 PATCH: 7 PATCH, EXTENDED RELEASE TRANSDERMAL at 04:59

## 2023-09-02 RX ADMIN — DOCUSATE SODIUM 100 MG: 100 CAPSULE, LIQUID FILLED ORAL at 17:41

## 2023-09-02 RX ADMIN — THIAMINE HCL TAB 100 MG 100 MG: 100 TAB at 08:29

## 2023-09-02 RX ADMIN — HEPARIN SODIUM 5000 UNITS: 5000 INJECTION INTRAVENOUS; SUBCUTANEOUS at 14:59

## 2023-09-02 RX ADMIN — ATORVASTATIN CALCIUM 20 MG: 20 TABLET, FILM COATED ORAL at 08:29

## 2023-09-02 NOTE — PLAN OF CARE
Problem: Potential for Falls  Goal: Patient will remain free of falls  Description: INTERVENTIONS:  - Educate patient/family on patient safety including physical limitations  - Instruct patient to call for assistance with activity   - Consult OT/PT to assist with strengthening/mobility   - Keep Call bell within reach  - Keep bed low and locked with side rails adjusted as appropriate  - Keep care items and personal belongings within reach  - Initiate and maintain comfort rounds  - Make Fall Risk Sign visible to staff    Problem: DISCHARGE PLANNING  Goal: Discharge to home or other facility with appropriate resources  Description: INTERVENTIONS:  - Identify barriers to discharge w/patient and caregiver  - Arrange for needed discharge resources and transportation as appropriate  - Identify discharge learning needs (meds, wound care, etc.)  - Arrange for interpretive services to assist at discharge as needed  - Refer to Case Management Department for coordinating discharge planning if the patient needs post-hospital services based on physician/advanced practitioner order or complex needs related to functional status, cognitive ability, or social support system  Outcome: Progressing     - Apply yellow socks and bracelet for high fall risk patients  - Consider moving patient to room near nurses station  Outcome: Progressing

## 2023-09-02 NOTE — PROGRESS NOTES
[address]  Progress Note  Name: Josee Cope  MRN: [id number]  Unit/Bed#: -02 I Date of Admission: 9/1/2023   Date of Service: 9/2/2023 I Hospital Day: 1    Assessment/Plan   Acute appendicitis  Assessment & Plan  · Management per surgery team    Type 2 diabetes mellitus with diabetic polyneuropathy, without long-term current use of insulin [facility])  Assessment & Plan  Lab Results   Component Value Date    HGBA1C 7.9 (H) 08/31/2023       Recent Labs     09/01/23  1638 09/01/23  2351 09/02/23  0828 09/02/23  1103   POCGLU 162* 97 140 145*       Blood Sugar Average: Last 72 hrs:  (P) [id number]   Blood sugars are stable on current insulin coverage regimen. Depression  Assessment & Plan  • On Remeron. • Has significant anxiety issues and will give Atarax as needed     Peripheral vascular disease ([address])  Assessment & Plan  • On statins.     Essential hypertension  Assessment & Plan  Lisinopril held preoperatively.     Coronary artery disease involving native coronary artery of native heart without angina pectoris  Assessment & Plan  History of coronary artery disease status post PCI  Can resume dual antiplatelet therapy postop  Continue statins.     Alcoholism ([address])  Assessment & Plan  Will give thiamine and folic acid. TE Pharmacologic Prophylaxis: Heparin    Patient Centered Rounds: I have performed bedside rounds with nursing staff today.   Discussions with Specialists or Other Care Team Provider: Surgery  Education and Discussions with Family / Patient: Patient    Current Length of Stay: 1 day(s)  Current Patient Status: Inpatient     Certification Statement: The patient will continue to require additional inpatient hospital stay due to abdominal pain secondary to acute appendicitis  Discharge Plan / Estimated Discharge Date: 1 to 2 days    Code Status: Level 1 - Full Code  ______________________________________________________________________________    Subjective:   Patient seen and examined by me. We were consulted again by surgery as the patient became agitated and anxious overnight and went out and had to come back to the emergency room. No chest pain, palpitations or diaphoresis. States the abdominal pain is better. Having some anxiety issues but is able to tolerate diet well. No nausea or vomiting    Objective:   Vitals: Blood pressure 147/76, pulse 95, temperature 98.1 °F (36.7 °C), resp. rate (!) 33, SpO2 96 %.     Physical Exam:   General appearance: alert, fatigued, appears stated age and cooperative  Constitutional- looks a little weak  HEENT - atraumatic and normocephalic  Neck- supple  Skin - no fresh rash  Extremities no fresh focal deformities  Cardiovascular- S1-S2 heard  Respiratory- bilateral air entry present, no crackles or rhonchi  Skin - no fresh rash  Abdomen - normal bowel sounds present, no rebound tenderness, s/p surgery for acute perforated appendicitis situs  CNS- No fresh focal deficits  Psych- no acute psychosis     Additional Data:   Labs:  Results from last 7 days   Lab Units 09/02/23  0837 09/01/23 2157 09/01/23  0554 08/31/23  1034 08/30/23 0449 08/29/23  2116   WBC Thousand/uL 5.84 8.44 7.03 8.26 6.96 8.80   HEMOGLOBIN g/dL 9.4* 10.1* 9.8* 9.4* 9.6* 10.3*   HEMATOCRIT % 28.1* 30.6* 30.2* 29.0* 28.2* 30.4*   MCV fL 92 93 94 96 92 93   TOTAL NEUT ABS Thousand/uL 4.09  --   --   --   --   --    PLATELETS Thousands/uL 346 364 357 323 309 352     Results from last 7 days   Lab Units 09/02/23  0837 09/01/23  0554 08/31/23  1034 08/30/23  0449 08/29/23  2116   SODIUM mmol/L 139 137 135 138 137   POTASSIUM mmol/L 4.1 4.0 4.2 3.9 3.7   CHLORIDE mmol/L 109* 107 106 108 105   CO2 mmol/L 25 23 23 25 22   ANION GAP mmol/L 5 7 6 5 10   BUN mg/dL 5 7 10 21 23   CREATININE mg/dL 1.15 1.19 1.30 1.33* 1.42*   CALCIUM mg/dL 9.0 8.9 8.3* 8.6 8.8   ALBUMIN g/dL 3.5  --  3.4* 3.3* 3.6   TOTAL BILIRUBIN mg/dL 0.51  --  0.44 0.40 0.33   ALK PHOS U/L 51  --  46 43 51   ALT U/L 9  --  9 12 14   AST U/L 16  --  12* 14 17   EGFR ml/min/1.73sq m 61 58 52 51 47   GLUCOSE RANDOM mg/dL 131 212* 211* 185* 200*     Results from last 7 days   Lab Units 09/02/23  0837 08/30/23  0449   MAGNESIUM mg/dL 1.5* 1.4*   PHOSPHORUS mg/dL 3.3 3.5              Results from last 7 days   Lab Units 09/01/23  2157   LACTIC ACID mmol/L 1.3     Results from last 7 days   Lab Units 09/02/23  1103 09/02/23  0828 09/01/23  2351 09/01/23  1638 09/01/23  1122 09/01/23  0730 08/31/23  2134 08/31/23  1606 08/31/23  1036 08/31/23  0717 08/30/23  1626 08/30/23  1228   POC GLUCOSE mg/dl 145* 140 97 162* 216* 213* 149* 176* 209* 229* 165* 179*     Results from last 7 days   Lab Units 08/31/23  0545   HEMOGLOBIN A1C % 7.9*         * I Have Reviewed All Lab Data Listed Above. Cultures:               Imaging:  Imaging Reports Reviewed Today Include:   No results found.   Scheduled Meds:  Current Facility-Administered Medications   Medication Dose Route Frequency Provider Last Rate   • acetaminophen  650 mg Oral Q6H PRN Arleneyolanda Lang DO     • ampicillin-sulbactam  3 g Intravenous Q6H Marcelle Espinoza DO 3 g (09/02/23 1055)   • aspirin  81 mg Oral Daily Arlene Precious DO     • atorvastatin  20 mg Oral Daily Tasha Hilliard PA-C     • clopidogrel  75 mg Oral Daily Marcelle Peters DO     • docusate sodium  100 mg Oral BID Tasha Hilliard PA-C     • folic acid  124 mcg Oral Daily Tasha Hilliard PA-C     • heparin (porcine)  5,000 Units Subcutaneous Q8H [address] Tasha Hilliard PA-C     • hydrOXYzine HCL  25 mg Oral TID PRN Darcie Bending, MD     • insulin lispro  1-5 Units Subcutaneous TID AC Tasha Hilliard PA-C     • insulin lispro  1-5 Units Subcutaneous HS Tasha Hilliard PA-C     • mirtazapine  15 mg Oral HS Tasha Hilliard PA-C     • morphine injection  2 mg Intravenous Q3H PRN Primus Fried, DO     • nicotine  1 patch Transdermal Daily Rajiv Allen PA-C     • ondansetron  4 mg Intravenous Q6H PRN Primus Fried, DO     • oxyCODONE  5 mg Oral Q6H PRN Primus Fried, DO     • pantoprazole  40 mg Intravenous Q24H [address] Rajiv Allen PA-C     • thiamine  100 mg Oral Daily Rajiv Allen PA-C     • traMADol  50 mg Oral Q6H PRN Primus Fried, DO         Power Thakur MD  [facility]    ** Please Note: This note has been constructed using a voice recognition system.  **

## 2023-09-02 NOTE — NURSING NOTE
[facility] serched with security & nursing staff after obtaining pt verbal consent, one lighter recovered.

## 2023-09-02 NOTE — ED NOTES
Pts family requesting to speak to supervisor regarding pts earlier [facility] and today's treatment status.  House supervisor at [address] [place] [address], [address] [place] [address]  09/01/23 9586

## 2023-09-02 NOTE — PLAN OF CARE
Problem: PAIN - ADULT  Goal: Verbalizes/displays adequate comfort level or baseline comfort level  Description: Interventions:  - Encourage patient to monitor pain and request assistance  - Assess pain using appropriate pain scale  - Administer analgesics based on type and severity of pain and evaluate response  - Implement non-pharmacological measures as appropriate and evaluate response  - Consider cultural and social influences on pain and pain management  - Notify physician/advanced practitioner if interventions unsuccessful or patient reports new pain  Outcome: Progressing     Problem: INFECTION - ADULT  Goal: Absence or prevention of progression during hospitalization  Description: INTERVENTIONS:  - Assess and monitor for signs and symptoms of infection  - Monitor lab/diagnostic results  - Monitor all insertion sites, i.e. indwelling lines, tubes, and drains  - Monitor endotracheal if appropriate and nasal secretions for changes in amount and color  - Sarah Ann appropriate cooling/warming therapies per order  - Administer medications as ordered  - Instruct and encourage patient and family to use good hand hygiene technique  - Identify and instruct in appropriate isolation precautions for identified infection/condition  Outcome: Progressing  Goal: Absence of fever/infection during neutropenic period  Description: INTERVENTIONS:  - Monitor WBC    Outcome: Progressing     Problem: Knowledge Deficit  Goal: Patient/family/caregiver demonstrates understanding of disease process, treatment plan, medications, and discharge instructions  Description: Complete learning assessment and assess knowledge base.   Interventions:  - Provide teaching at level of understanding  - Provide teaching via preferred learning methods  Outcome: Progressing     Problem: DISCHARGE PLANNING  Goal: Discharge to home or other facility with appropriate resources  Description: INTERVENTIONS:  - Identify barriers to discharge w/patient and caregiver  - Arrange for needed discharge resources and transportation as appropriate  - Identify discharge learning needs (meds, wound care, etc.)  - Arrange for interpretive services to assist at discharge as needed  - Refer to Case Management Department for coordinating discharge planning if the patient needs post-hospital services based on physician/advanced practitioner order or complex needs related to functional status, cognitive ability, or social support system  Outcome: Progressing     Problem: SAFETY ADULT  Goal: Patient will remain free of falls  Description: INTERVENTIONS:  - Educate patient/family on patient safety including physical limitations  - Instruct patient to call for assistance with activity   - Consult OT/PT to assist with strengthening/mobility   - Keep Call bell within reach  - Keep bed low and locked with side rails adjusted as appropriate  - Keep care items and personal belongings within reach  - Initiate and maintain comfort rounds  - Make Fall Risk Sign visible to staff  - Offer Toileting every 2 Hours, in advance of need  - Initiate/Maintain bed chair alarm  - Obtain necessary fall risk management equipment:   - Apply yellow socks and bracelet for high fall risk patients  - Consider moving patient to room near nurses station  Outcome: Progressing  Goal: Maintain or return to baseline ADL function  Description: INTERVENTIONS:  -  Assess patient's ability to carry out ADLs; assess patient's baseline for ADL function and identify physical deficits which impact ability to perform ADLs (bathing, care of mouth/teeth, toileting, grooming, dressing, etc.)  - Assess/evaluate cause of self-care deficits   - Assess range of motion  - Assess patient's mobility; develop plan if impaired  - Assess patient's need for assistive devices and provide as appropriate  - Encourage maximum independence but intervene and supervise when necessary  - Involve family in performance of ADLs  - Assess for home care needs following discharge   - Consider OT consult to assist with ADL evaluation and planning for discharge  - Provide patient education as appropriate  Outcome: Progressing  Goal: Maintains/Returns to pre admission functional level  Description: INTERVENTIONS:  - Perform BMAT or MOVE assessment daily.   - Set and communicate daily mobility goal to care team and patient/family/caregiver. - Collaborate with rehabilitation services on mobility goals if consulted  - Perform Range of Motion 2 times a day. - Reposition patient every 2 hours.   - Dangle patient 2 times a day  - Stand patient 2 times a day  - Ambulate patient 2 times a day  - Out of bed to chair 3 times a day   - Out of bed for meals 3 times a day  - Out of bed for toileting  - Record patient progress and toleration of activity level   Outcome: Progressing

## 2023-09-02 NOTE — QUICK NOTE
Notified by RN patient ripped out his IV and got dressed and was walking down the hallway to the elevator to leave despite RN educating him on risks of leaving [facility]. Upon approach patient was seen in the hallway and when asked if he would like to return to his room to speak in private patient refused and started walking towards the elevator. Patient reports he is too anxious and just wants to leave the hospital.  He reports "they told me if everything is good I might leave tomorrow."  Patient was encouraged to stay to speak with team in the morning and continue care. Was also offered medication to help with anxiety tonight, but he refused. Discussed risks of leaving [address] [place] [address] including patient still with TONNY drain in place and will need to coordinate care to remove eventually, worsening infection without completing antibiotic course, sepsis, death. Patient reports if he feels poorly enough that he knows he can return to the hospital and understands he can follow-up outpatient on his own. Patient during conversation is alert and oriented x4 to person, place, time, situation. When asked if patient had a ride home he reports his daughter or grandkids can pick him up and patient was offered by both nurse and this provider that he can wait in his room to wait for his family, however, patient walked away from this provider and got on the elevator and said he will wait for his family downstairs. Patient notified multiple times to return if he starts to feel worse and he understands. Signed AMA paperwork with provider. On-call surgical team to be notified by RN.

## 2023-09-02 NOTE — PROGRESS NOTES
As per note order, patient was accompanied by a family member and staff (myself) to the outside grounds @ [phone]. Patient and family member proceeded to smoke cigarettes by the [facility]. I told the patient and family member that smoking was not permitted and could disrupt the patient's care. They put the cigarettes out and we returned inside around 1830. RN aware.  Glo FOX

## 2023-09-02 NOTE — ED PROVIDER NOTES
History  Chief Complaint   Patient presents with   • Anxiety     Pt reports signing out AMA "just now from upstairs" Pt had an appendectomy and "I just want to go home but now I'm feeling anxious, they told me I could come back"     This is a 51-year-old male that was just discharged from the hospital [address] [place] [address] after recent appendectomy. Patient currently has an abdominal drain in place. As per the patient, patient felt very anxious and they were not treating that anxiety he feels scared so he signed himself out. Before he even got out of the hospital family was able to come in and convince him to get checked back then. He is having abdominal pain which is moderate to severe and constant. No vomiting no diarrhea at this time. Patient at this time is willing to come back into the hospital as long as we treat him for the anxiety. History provided by:  Patient   used: No    Anxiety      Prior to Admission Medications   Prescriptions Last Dose Informant Patient Reported? Taking?    Blood Pressure Monitor MARIELLA  Self No No   Sig: by Does not apply route daily   Lancets (FREESTYLE) lancets  Self No No   Sig: Use as instructed   aspirin 81 mg chewable tablet  Self Yes No   Sig: Chew 81 mg daily   atorvastatin (LIPITOR) 20 mg tablet  Self No No   Sig: Take 1 tablet (20 mg total) by mouth daily   clopidogrel (PLAVIX) 75 mg tablet  Self No No   Sig: Take 1 tablet (75 mg total) by mouth daily   ferrous sulfate 325 (65 Fe) mg tablet  Self Yes No   Sig: Take 325 mg by mouth daily with breakfast   folic acid (FOLVITE) 1 mg tablet  Self No No   Sig: Take 1 tablet (1 mg total) by mouth daily   gabapentin (NEURONTIN) 100 mg capsule   No No   Sig: take 1 capsule by mouth three times a day   glucose blood test strip  Self No No   Sig: Use as instructed   glucose monitoring kit (FREESTYLE) monitoring kit  Self No No   Si each by Does not apply route daily   lisinopril (ZESTRIL) 20 mg tablet  Self No No Sig: Take 1 tablet (20 mg total) by mouth daily   mirtazapine (REMERON) 15 mg tablet  Self No No   Sig: Take 1 tablet (15 mg total) by mouth daily at bedtime   nicotine (NICODERM CQ) 14 mg/24hr TD 24 hr patch   No No   Sig: Place 1 patch on the skin over 24 hours daily Do not start before August 21, 2023. omega-3-acid ethyl esters (LOVAZA) 1 g capsule  Self Yes No   Sig: Take 2 capsules by mouth 2 (two) times a day   omeprazole (PriLOSEC) 20 mg delayed release capsule   No No   Sig: Take 1 capsule (20 mg total) by mouth daily   pantoprazole (PROTONIX) 40 mg tablet   No No   Sig: Take 1 tablet (40 mg total) by mouth 2 (two) times a day for 14 days   pregabalin (LYRICA) 300 MG capsule  Self Yes No   Sig: Take 300 mg by mouth 3 (three) times a day   sitaGLIPtin-metFORMIN (JANUMET)  MG per tablet  Self No No   Sig: Take 1 tablet by mouth 2 (two) times a day with meals   sucralfate (CARAFATE) 1 g/10 mL suspension  Self No No   Sig: Take 10 mL (1 g total) by mouth 4 (four) times a day   thiamine 100 MG tablet  Self No No   Sig: Take 1 tablet (100 mg total) by mouth daily      Facility-Administered Medications: None       Past Medical History:   Diagnosis Date   • Chest pain    • Chronic hepatitis C ([address]) 3/15/2013   • Diabetes mellitus (HCC)    • Heartburn    • Hepatitis C    • Indigestion    • Liver disease    • Myocardial infarction ([address])    • Seizures ([address])        Past Surgical History:   Procedure Laterality Date   • APPENDECTOMY LAPAROSCOPIC N/A 8/30/2023    Procedure: APPENDECTOMY LAPAROSCOPIC;  Surgeon: Adalberto Nuñez DO;  Location: [place] [facility] OR;  Service: General       Family History   Problem Relation Age of Onset   • Heart attack Mother    • Diabetes Mother    • Colon cancer Father    • Diabetes Father    • Alcohol abuse Neg Hx    • Substance Abuse Neg Hx    • Mental illness Neg Hx    • Depression Neg Hx      I have reviewed and agree with the history as documented.     E-Cigarette/Vaping   • E-Cigarette Use Never User      E-Cigarette/Vaping Substances   • Nicotine No    • THC No    • CBD No    • Flavoring No    • Other No    • Unknown No      Social History     Tobacco Use   • Smoking status: Every Day     Packs/day: 0.50     Types: Cigarettes   • Smokeless tobacco: Never   Vaping Use   • Vaping Use: Never used   Substance Use Topics   • Alcohol use:  Yes     Alcohol/week: 21.0 standard drinks of alcohol     Types: 21 Cans of beer per week   • Drug use: Not Currently     Types: Marijuana       Review of Systems    Physical Exam  Physical Exam    Vital Signs  ED Triage Vitals [09/01/23 2052]   Temperature Pulse Respirations Blood Pressure SpO2   (!) 96.8 °F (36 °C) (!) 123 (!) 33 161/84 98 %      Temp Source Heart Rate Source Patient Position - Orthostatic VS BP Location FiO2 (%)   Temporal Monitor Sitting Left arm --      Pain Score       --           Vitals:    09/01/23 2052   BP: 161/84   Pulse: (!) 123   Patient Position - Orthostatic VS: Sitting         Visual Acuity      ED Medications  Medications   sodium chloride 0.9 % bolus 1,000 mL (has no administration in time range)   fentanyl citrate (PF) 100 MCG/2ML 50 mcg (has no administration in time range)   LORazepam (ATIVAN) injection 1 mg (has no administration in time range)       Diagnostic Studies  Results Reviewed     Procedure Component Value Units Date/Time    CBC and differential [[id number]]  (Abnormal) Collected: 09/01/23 2157    Lab Status: Final result Specimen: Blood from Arm, Right Updated: 09/01/23 2205     WBC 8.44 Thousand/uL      RBC 3.29 Million/uL      Hemoglobin 10.1 g/dL      Hematocrit 30.6 %      MCV 93 fL      MCH 30.7 pg      MCHC 33.0 g/dL      RDW 15.4 %      MPV 9.5 fL      Platelets 946 Thousands/uL      nRBC 0 /100 WBCs      Neutrophils Relative 81 %      Immat GRANS % 0 %      Lymphocytes Relative 11 %      Monocytes Relative 7 %      Eosinophils Relative 1 %      Basophils Relative 0 %      Neutrophils Absolute 6.81 Thousands/µL      Immature Grans Absolute 0.02 Thousand/uL      Lymphocytes Absolute 0.95 Thousands/µL      Monocytes Absolute 0.60 Thousand/µL      Eosinophils Absolute 0.05 Thousand/µL      Basophils Absolute 0.01 Thousands/µL     Lactic acid, plasma (w/reflex if result > 2.0) [[id number]] Collected: 09/01/23 2157    Lab Status: In process Specimen: Blood from Arm, Right Updated: 09/01/23 9219    Basic metabolic panel [[id number]] Collected: 09/01/23 2157    Lab Status: In process Specimen: Blood from Arm, Right Updated: 09/01/23 2202                 No orders to display              Procedures  Procedures         ED Course  ED Course as of 09/01/23 2210   Fri Sep 01, 2023   2207 WBC: 8.44   2207 Hemoglobin(!): 10.1   2207 Platelet Count: 449                               SBIRT 20yo+    Flowsheet Row Most Recent Value   Initial Alcohol Screen: US AUDIT-C     1. How often do you have a drink containing alcohol? 0 Filed at: 09/01/2023 2052   2. How many drinks containing alcohol do you have on a typical day you are drinking? 0 Filed at: 09/01/2023 2052   3b. FEMALE Any Age, or MALE 65+: How often do you have 4 or more drinks on one occassion? 0 Filed at: 09/01/2023 2052   Audit-C Score 0 Filed at: 09/01/2023 2052   MICHELLE: How many times in the past year have you. .. Used an illegal drug or used a prescription medication for non-medical reasons? Never Filed at: 09/01/2023 2052                    Medical Decision Making  Case has been discussed with the general surgeon Dr. Doretha Dean to readmit under his service. Anxiety: acute illness or injury  Status post appendectomy: acute illness or injury  Amount and/or Complexity of Data Reviewed  Labs: ordered. Risk  Prescription drug management. Decision regarding hospitalization.           Disposition  Final diagnoses:   Abdominal pain   Status post appendectomy   Anxiety     Time reflects when diagnosis was documented in both MDM as applicable and the Disposition within this note     Time User Action Codes Description Comment    9/1/2023 10:06 PM Melvin Villa [R10.9] Abdominal pain     9/1/2023 10:06 PM Melvin Villa [Z90.49] Status post appendectomy     9/1/2023 10:06 PM Melvin Villa [F41.9] Anxiety       ED Disposition     ED Disposition   Admit    Condition   Stable    Date/Time   Fri Sep 1, 2023 10:06 PM    Comment   Case was discussed with Rupal and the patient's admission status was agreed to be Admission Status: inpatient status to the service of Dr. Linda Palomo . Follow-up Information    None         Patient's Medications   Discharge Prescriptions    No medications on file       No discharge procedures on file.     PDMP Review     None          ED Provider  Electronically Signed by           Arianna Field MD  09/01/23 4561

## 2023-09-02 NOTE — ASSESSMENT & PLAN NOTE
Lab Results   Component Value Date    HGBA1C 7.9 (H) 08/31/2023       Recent Labs     09/01/23  1638 09/01/23  2351 09/02/23  0828 09/02/23  1103   POCGLU 162* 97 140 145*       Blood Sugar Average: Last 72 hrs:  (P) [id number]   Blood sugars are stable on current insulin coverage regimen.

## 2023-09-02 NOTE — NURSING NOTE
Pt left signed out AMA was given multiple explanations of risks associated, IV D/C'd, TONNY drain still in place. Pt A&Ox4.

## 2023-09-02 NOTE — NURSING NOTE
Pt A&Ox4 with no c/o pain or discomfort, no s/s acute distress noted. Pt granddaughter & granddaughters friend present at bedside. Pt continually threatened primary nurse to leave facility without staff knowledge if we didn't allow him to "get some fresh air". Multiple attempts to redirect pt with little affect. Charge nurse, Supervisor & Primary Attending notified. New orders put in place for pt to be escorted outside to the front seating area accompanied by family & a staff member for 20 minutes during visiting hours. Primary nurse educated pt & family at length on [facility] no smoking policy, both within and on hospital property. Pt escorted by PCA & granddaughter to specified area. PCA reports pt attempting to light cigarette but was able to redirect pt successfully. Later after returning to room, PCA reported finding cigarette remanence in pt bathroom. Primary nurse spoke with pt and family in regards to needing to search belongings for contraband. Pt family states "I got rid of the cigarettes & lighter". Nurse unable to find any further items. No further incidents this tour. Will continue to monitor pt for any changes in condition. 18:30pm: Primary nurse notified by PCA pt attempting to leave unit with family members & reiterated that a staff member needs to accompany off unite ats well. Pt disregarded PCA & staff recommendation and proceed with leaving the hospital building and smoking cigarettes with family members by fountain outside hospital.. Charge nurse notified hospital supervisor & security.

## 2023-09-02 NOTE — UTILIZATION REVIEW
Initial Clinical Review    Admission: Date/Time/Statement:   Admission Orders (From admission, onward)     Ordered        09/01/23 2205  INPATIENT ADMISSION  Once                      Orders Placed This Encounter   Procedures   • INPATIENT ADMISSION     Standing Status:   Standing     Number of Occurrences:   1     Order Specific Question:   Level of Care     Answer:   Med Surg [16]     Order Specific Question:   Estimated length of stay     Answer:   More than 2 Midnights     Order Specific Question:   Certification     Answer:   I certify that inpatient services are medically necessary for this patient for a duration of greater than two midnights. See H&P and MD Progress Notes for additional information about the patient's course of treatment. ED Arrival Information     Expected   -    Arrival   9/1/2023 20:41    Acuity   Urgent            Means of arrival   Wheelchair    Escorted by   Self    Service   Surgery-General    Admission type   Emergency            Arrival complaint   Post Op Abdominal pain           Chief Complaint   Patient presents with   • Anxiety     Pt reports signing out AMA "just now from upstairs" Pt had an appendectomy and "I just want to go home but now I'm feeling anxious, they told me I could come back"       Initial Presentation: 68 y.o. male presented to ED . Trino Kelley Now  Is  POD  #  3   Lap Appy for perforated  Appendicitis with abscess. Signed  AMA, has abdominal drain in place. . States patient very anxious, signed himself out. Did not even leave the  Facility, family convinced him to stay. Has  Moderate to severe/constant abdominal pain. Now willing to stay. Admit  Ip  POD  #  3  Lap Appy    And plan is   D/C  IVF, monitor labs,  Advance diet to soft, encourage ambulation  And continue  Current meds. Date: 9/2        Day 2:   Abdominal pain improved. Continue pain control as needed. Has anxiety. Continue current meds/treatment plan.     Date:   9/3    Day 3: Has surpassed a 2nd midnight with active treatments and services, which include   D/C  home.     ED Triage Vitals   Temperature Pulse Respirations Blood Pressure SpO2   09/01/23 2052 09/01/23 2052 09/01/23 2052 09/01/23 2052 09/01/23 2052   (!) 96.8 °F (36 °C) (!) 123 (!) 33 161/84 98 %      Temp Source Heart Rate Source Patient Position - Orthostatic VS BP Location FiO2 (%)   09/01/23 2052 09/01/23 2052 09/01/23 2052 09/01/23 2052 --   Temporal Monitor Sitting Left arm       Pain Score       09/01/23 2305       2          Wt Readings from Last 1 Encounters:   09/01/23 66 kg (145 lb 8.1 oz)     Additional Vital Signs:   97.1 °F (36.2 °C) Abnormal  103 -- 120/65 83 98 % -- --    09/02/23 08:30:07 98.1 °F (36.7 °C) 95 -- 147/76 100 96 % -- --   09/01/23 23:15:47 -- 104 -- -- -- 99 % -- --   09/01/23 23:11:07 98.5 °F (36.9 °C) -- -- 146/73 97 -- -- --   09/01/23 23:09:48 98.5 °F (36.9 °C) -- -- 146/73 97 -- -- --   09/01/23 2052 96.8 °F (36 °C) Abnormal  123 Abnormal  33 Abnormal  161/84 112 98 % None (Room air) Sitting       Pertinent Labs/Diagnostic Test Results:       Results from last 7 days   Lab Units 09/02/23  0837 09/01/23  2157 09/01/23  0554 08/31/23  1034 08/30/23  0449   WBC Thousand/uL 5.84 8.44 7.03 8.26 6.96   HEMOGLOBIN g/dL 9.4* 10.1* 9.8* 9.4* 9.6*   HEMATOCRIT % 28.1* 30.6* 30.2* 29.0* 28.2*   PLATELETS Thousands/uL 346 364 357 323 309   NEUTROS ABS Thousands/µL  --  6.81 4.93 6.47 5.00         Results from last 7 days   Lab Units 09/02/23  0837 09/01/23  0554 08/31/23  1034 08/30/23  0449 08/29/23  2116   SODIUM mmol/L 139 137 135 138 137   POTASSIUM mmol/L 4.1 4.0 4.2 3.9 3.7   CHLORIDE mmol/L 109* 107 106 108 105   CO2 mmol/L 25 23 23 25 22   ANION GAP mmol/L 5 7 6 5 10   BUN mg/dL 5 7 10 21 23   CREATININE mg/dL 1.15 1.19 1.30 1.33* 1.42*   EGFR ml/min/1.73sq m 61 58 52 51 47   CALCIUM mg/dL 9.0 8.9 8.3* 8.6 8.8   MAGNESIUM mg/dL 1.5*  --   --  1.4*  --    PHOSPHORUS mg/dL 3.3  --   --  3.5  -- Results from last 7 days   Lab Units 09/02/23  0837 08/31/23  1034 08/30/23  0449 08/29/23  2116   AST U/L 16 12* 14 17   ALT U/L 9 9 12 14   ALK PHOS U/L 51 46 43 51   TOTAL PROTEIN g/dL 6.8 6.3* 6.1* 6.8   ALBUMIN g/dL 3.5 3.4* 3.3* 3.6   TOTAL BILIRUBIN mg/dL 0.51 0.44 0.40 0.33     Results from last 7 days   Lab Units 09/02/23  1103 09/02/23  0828 09/01/23  2351 09/01/23  1638 09/01/23  1122 09/01/23  0730 08/31/23  2134 08/31/23  1606 08/31/23  1036 08/31/23  0717 08/30/23  1626 08/30/23  1228   POC GLUCOSE mg/dl 145* 140 97 162* 216* 213* 149* 176* 209* 229* 165* 179*     Results from last 7 days   Lab Units 09/02/23  0837 09/01/23  0554 08/31/23  1034 08/30/23  0449 08/29/23  2116   GLUCOSE RANDOM mg/dL 131 212* 211* 185* 200*         Results from last 7 days   Lab Units 08/31/23  0545   HEMOGLOBIN A1C % 7.9*   EAG mg/dl 180           Results from last 7 days   Lab Units 09/01/23  2157   LACTIC ACID mmol/L 1.3                                 Results from last 7 days   Lab Units 08/29/23  2116   LIPASE u/L 17                 Results from last 7 days   Lab Units 08/29/23  2258   CLARITY UA  Clear   COLOR UA  Light Yellow   SPEC GRAV UA  1.023   PH UA  6.5   GLUCOSE UA mg/dl >=1000 (1%)*   KETONES UA mg/dl Negative   BLOOD UA  Negative   PROTEIN UA mg/dl Negative   NITRITE UA  Negative   BILIRUBIN UA  Negative   UROBILINOGEN UA (BE) mg/dl <2.0   LEUKOCYTES UA  Elevated glucose may cause decreased leukocyte values.  See urine microscopic for UWBC result*   WBC UA /hpf 1-2   RBC UA /hpf 1-2   BACTERIA UA /hpf Occasional   EPITHELIAL CELLS WET PREP /hpf None Seen             ED Treatment:   Medication Administration from 09/01/2023 2041 to 09/01/2023 2300       Date/Time Order Dose Route Action Comments     09/01/2023 2221 EDT sodium chloride 0.9 % bolus 1,000 mL 1,000 mL Intravenous New Bag --     09/01/2023 2221 EDT fentanyl citrate (PF) 100 MCG/2ML 50 mcg 50 mcg Intravenous Given --     09/01/2023 2221 EDT LORazepam (ATIVAN) injection 1 mg 1 mg Intravenous Given --        Present on Admission:  • Type 2 diabetes mellitus with diabetic polyneuropathy, without long-term current use of insulin (HCC)  • Acute appendicitis      Admitting Diagnosis: Mixed hyperlipidemia [E78.2]  Alcoholism ([facility]) [F10.20]  Anxiety [F41.9]  Essential hypertension [I10]  Abdominal pain [R10.9]  Smoking [F17.200]  Status post appendectomy [Z90.49]  Other cirrhosis of liver ([facility]) [K74.69]  Coronary artery disease involving native coronary artery of native heart without angina pectoris [I25.10]  Type 2 diabetes mellitus with diabetic polyneuropathy, without long-term current use of insulin (HCC) [E11.42]  Depression, unspecified depression type [F32. A]  Age/Sex: 68 y.o. male  Admission Orders:  Scheduled Medications:  ampicillin-sulbactam, 3 g, Intravenous, Q6H  aspirin, 81 mg, Oral, Daily  atorvastatin, 20 mg, Oral, Daily  clopidogrel, 75 mg, Oral, Daily  docusate sodium, 100 mg, Oral, BID  folic acid, 715 mcg, Oral, Daily  heparin (porcine), 5,000 Units, Subcutaneous, Q8H FLACO  insulin lispro, 1-5 Units, Subcutaneous, TID AC  insulin lispro, 1-5 Units, Subcutaneous, HS  mirtazapine, 15 mg, Oral, HS  nicotine, 1 patch, Transdermal, Daily  pantoprazole, 40 mg, Intravenous, Q24H FLACO  thiamine, 100 mg, Oral, Daily      Continuous IV Infusions:     PRN Meds:  acetaminophen, 650 mg, Oral, Q6H PRN  hydrOXYzine HCL, 25 mg, Oral, TID PRN  morphine injection, 2 mg, Intravenous, Q3H PRN  ondansetron, 4 mg, Intravenous, Q6H PRN  oxyCODONE, 5 mg, Oral, Q6H PRN  traMADol, 50 mg, Oral, Q6H PRN        IP CONSULT TO INTERNAL MEDICINE    Network Utilization Review Department  ATTENTION: Please call with any questions or concerns to [phone] and carefully listen to the prompts so that you are directed to the right person.  All voicemails are confidential.  Bharat Avitia all requests for admission clinical reviews, approved or denied determinations and any other requests to dedicated fax number below belonging to the campus where the patient is receiving treatment.  List of dedicated fax numbers for the Facilities:  [facility] (Administrative/Medical Necessity) [phone] [address] (Maternity/NICU/Pediatrics) [phone]   [address] [place] [address] [phone]   [facility] [address] [facility] [phone] [address] [phone]   [address] [facility] [address] [phone]

## 2023-09-02 NOTE — PLAN OF CARE
Pt became aggressive with unsteady gait after readmission with antianxiety meds and pain meds. Pt threatened to hit staff, witnessed hitting family member, and not able to be directed. Requested pts family be present throughout admission for pt and staff safety, educated family on the safety measures such as restraints that can happen if pt does isn't directible and is a danger to self and others. Pt AOx3-4 all night just anxious, anti-anxiety suspected to have paradoxical effect.            Problem: PAIN - ADULT  Goal: Verbalizes/displays adequate comfort level or baseline comfort level  Description: Interventions:  - Encourage patient to monitor pain and request assistance  - Assess pain using appropriate pain scale  - Administer analgesics based on type and severity of pain and evaluate response  - Implement non-pharmacological measures as appropriate and evaluate response  - Consider cultural and social influences on pain and pain management  - Notify physician/advanced practitioner if interventions unsuccessful or patient reports new pain  Outcome: Progressing     Problem: INFECTION - ADULT  Goal: Absence or prevention of progression during hospitalization  Description: INTERVENTIONS:  - Assess and monitor for signs and symptoms of infection  - Monitor lab/diagnostic results  - Monitor all insertion sites, i.e. indwelling lines, tubes, and drains  - Monitor endotracheal if appropriate and nasal secretions for changes in amount and color  - Resaca appropriate cooling/warming therapies per order  - Administer medications as ordered  - Instruct and encourage patient and family to use good hand hygiene technique  - Identify and instruct in appropriate isolation precautions for identified infection/condition  Outcome: Progressing  Goal: Absence of fever/infection during neutropenic period  Description: INTERVENTIONS:  - Monitor WBC    Outcome: Progressing     Problem: DISCHARGE PLANNING  Goal: Discharge to home or other facility with appropriate resources  Description: INTERVENTIONS:  - Identify barriers to discharge w/patient and caregiver  - Arrange for needed discharge resources and transportation as appropriate  - Identify discharge learning needs (meds, wound care, etc.)  - Arrange for interpretive services to assist at discharge as needed  - Refer to Case Management Department for coordinating discharge planning if the patient needs post-hospital services based on physician/advanced practitioner order or complex needs related to functional status, cognitive ability, or social support system  Outcome: Progressing     Problem: Knowledge Deficit  Goal: Patient/family/caregiver demonstrates understanding of disease process, treatment plan, medications, and discharge instructions  Description: Complete learning assessment and assess knowledge base.   Interventions:  - Provide teaching at level of understanding  - Provide teaching via preferred learning methods  Outcome: Progressing

## 2023-09-02 NOTE — H&P
H&P Exam - General Surgery   Lori Humphreys 68 y.o. male MRN: [id number]  Unit/Bed#: -02 Encounter: [id number]    Assessment/Plan     Lori Humphreys is a 68 y.o. male POD3 Laparoscopic Appendectomy for perforated appendicitis  with abscess. AVSS, no leukocytosis, hemoglobin is stable, remainder of a.m. labs are pending. TONNY drain 70 SS  Passing flatus, no BM. Plan:  • Advance to soft diet  • Discontinue IVF  • Continue IV antibiotics for intra-abdominal infection  • Monitor abdominal exam, labs, vitals  • PRN pain medication and anti-emetics  • Encourage ambulation  • DVT ppx: heparin  • Incentive spirometry 10 times/hour while awake  • Continue home medications as prescribed   •  General surgery is primary. Dwale text with any questions or concerns. • Plan for dc tomorrow if tolerating. History of Present Illness     HPI:  Per Jeniffer Hubbard on admission "Lori Humphreys is a 68y.o. year old male with PMHx of alcoholism, CAD on Plavix and ASA, type 2 diabetes mellitus, and depression. He presented to the emergency department yesterday after being called with a read from a previous CT. He was told that it there was concern for acute appendicitis on his CAT scan from his previous admission 2 weeks ago. Patient at that time was admitted with abdominal pain, nausea and vomiting. 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 Patient reports drinking large amounts daily in the past.  He denies any current alcohol intake since his discharge on 8/20/2023. He denies any abdominal pain. He denies any fevers or chills at home. He has been eating without difficulty and denies any nausea or vomiting. He denies any further abdominal pain. He denies any shortness of breath or chest pain. He denies any previous abdominal surgeries."    Review of Systems   Constitutional: Negative for chills and fever. HENT: Negative for ear pain and sore throat.     Eyes: Negative for pain and visual disturbance. Respiratory: Negative for cough and shortness of breath. Cardiovascular: Negative for chest pain and palpitations. Gastrointestinal: Negative for abdominal pain and vomiting. Genitourinary: Negative for dysuria and hematuria. Musculoskeletal: Negative for arthralgias and back pain. Skin: Negative for color change and rash. Neurological: Negative for seizures and syncope. All other systems reviewed and are negative.       Historical Information   Past Medical History:   Diagnosis Date   • Chest pain    • Chronic hepatitis C ([address]) 3/15/2013   • Diabetes mellitus ([address])    • Heartburn    • Hepatitis C    • Indigestion    • Liver disease    • Myocardial infarction ([address])    • Seizures ([address])      Past Surgical History:   Procedure Laterality Date   • APPENDECTOMY LAPAROSCOPIC N/A 8/30/2023    Procedure: APPENDECTOMY LAPAROSCOPIC;  Surgeon: Lisa Anderson DO;  Location: [facility];  Service: General     Social History   Social History     Substance and Sexual Activity   Alcohol Use Yes   • Alcohol/week: 21.0 standard drinks of alcohol   • Types: 21 Cans of beer per week     Social History     Substance and Sexual Activity   Drug Use Not Currently   • Types: Marijuana     Social History     Tobacco Use   Smoking Status Every Day   • Packs/day: 0.50   • Types: Cigarettes   Smokeless Tobacco Never     Family History: non-contributory    Meds/Allergies   all medications and allergies reviewed  No Known Allergies    Objective   First Vitals:   Blood Pressure: 161/84 (09/01/23 2052)  Pulse: (!) 123 (09/01/23 2052)  Temperature: (!) 96.8 °F (36 °C) (09/01/23 2052)  Temp Source: Temporal (09/01/23 2052)  Respirations: (!) 33 (09/01/23 2052)  SpO2: 98 % (09/01/23 2052)    Current Vitals:   Blood Pressure: 147/76 (09/02/23 0830)  Pulse: 95 (09/02/23 0830)  Temperature: 98.1 °F (36.7 °C) (09/02/23 0830)  Temp Source: Temporal (09/01/23 2052)  Respirations: (!) 33 (09/01/23 2052)  SpO2: 96 % (09/02/23 0830)      Intake/Output Summary (Last 24 hours) at 9/2/2023 0186  Last data filed at 9/2/2023 0201  Gross per 24 hour   Intake --   Output 70 ml   Net -70 ml       Invasive Devices     Peripheral Intravenous Line  Duration           Peripheral IV 09/01/23 Right Antecubital <1 day          Drain  Duration           Closed/Suction Drain Lateral LLQ Bulb 10 Fr. 2 days                Physical Exam  Vitals reviewed. Constitutional:       General: He is not in acute distress. Appearance: Normal appearance. He is obese. He is not ill-appearing or toxic-appearing. HENT:      Head: Normocephalic and atraumatic. Right Ear: External ear normal.      Left Ear: External ear normal.      Nose: Nose normal.      Mouth/Throat:      Mouth: Mucous membranes are moist.   Eyes:      General: No scleral icterus. Right eye: No discharge. Left eye: No discharge. Conjunctiva/sclera: Conjunctivae normal.   Cardiovascular:      Rate and Rhythm: Normal rate and regular rhythm. Pulmonary:      Effort: Pulmonary effort is normal. No respiratory distress. Abdominal:      General: There is no distension. Palpations: Abdomen is soft. Tenderness: There is no abdominal tenderness. There is no guarding. Musculoskeletal:         General: Normal range of motion. Cervical back: Normal range of motion. Skin:     General: Skin is warm. Coloration: Skin is not jaundiced. Neurological:      General: No focal deficit present. Mental Status: He is alert and oriented to person, place, and time. Psychiatric:         Mood and Affect: Mood normal.         Behavior: Behavior normal.         Thought Content: Thought content normal.         Judgment: Judgment normal.         Lab Results: I have personally reviewed pertinent lab results.     Recent Results (from the past 36 hour(s))   Fingerstick Glucose (POCT)    Collection Time: 08/31/23  9:34 PM   Result Value Ref Range    POC Glucose 149 (H) 65 - 140 mg/dl   CBC and differential    Collection Time: 09/01/23  5:54 AM   Result Value Ref Range    WBC 7.03 4.31 - 10.16 Thousand/uL    RBC 3.20 (L) 3.88 - 5.62 Million/uL    Hemoglobin 9.8 (L) 12.0 - 17.0 g/dL    Hematocrit 30.2 (L) 36.5 - 49.3 %    MCV 94 82 - 98 fL    MCH 30.6 26.8 - 34.3 pg    MCHC 32.5 31.4 - 37.4 g/dL    RDW 15.6 (H) 11.6 - 15.1 %    MPV 9.7 8.9 - 12.7 fL    Platelets 094 995 - 095 Thousands/uL    nRBC 0 /100 WBCs    Neutrophils Relative 70 43 - 75 %    Immat GRANS % 0 0 - 2 %    Lymphocytes Relative 21 14 - 44 %    Monocytes Relative 8 4 - 12 %    Eosinophils Relative 1 0 - 6 %    Basophils Relative 0 0 - 1 %    Neutrophils Absolute 4.93 1.85 - 7.62 Thousands/µL    Immature Grans Absolute 0.03 0.00 - 0.20 Thousand/uL    Lymphocytes Absolute 1.46 0.60 - 4.47 Thousands/µL    Monocytes Absolute 0.54 0.17 - 1.22 Thousand/µL    Eosinophils Absolute 0.05 0.00 - 0.61 Thousand/µL    Basophils Absolute 0.02 0.00 - 0.10 Thousands/µL   Basic metabolic panel    Collection Time: 09/01/23  5:54 AM   Result Value Ref Range    Sodium 137 135 - 147 mmol/L    Potassium 4.0 3.5 - 5.3 mmol/L    Chloride 107 96 - 108 mmol/L    CO2 23 21 - 32 mmol/L    ANION GAP 7 mmol/L    BUN 7 5 - 25 mg/dL    Creatinine 1.19 0.60 - 1.30 mg/dL    Glucose 212 (H) 65 - 140 mg/dL    Calcium 8.9 8.4 - 10.2 mg/dL    eGFR 58 ml/min/1.73sq m   Fingerstick Glucose (POCT)    Collection Time: 09/01/23  7:30 AM   Result Value Ref Range    POC Glucose 213 (H) 65 - 140 mg/dl   Fingerstick Glucose (POCT)    Collection Time: 09/01/23 11:22 AM   Result Value Ref Range    POC Glucose 216 (H) 65 - 140 mg/dl   Fingerstick Glucose (POCT)    Collection Time: 09/01/23  4:38 PM   Result Value Ref Range    POC Glucose 162 (H) 65 - 140 mg/dl   CBC and differential    Collection Time: 09/01/23  9:57 PM   Result Value Ref Range    WBC 8.44 4.31 - 10.16 Thousand/uL    RBC 3.29 (L) 3.88 - 5.62 Million/uL    Hemoglobin 10.1 (L) 12.0 - 17.0 g/dL    Hematocrit 30.6 (L) 36.5 - 49.3 %    MCV 93 82 - 98 fL    MCH 30.7 26.8 - 34.3 pg    MCHC 33.0 31.4 - 37.4 g/dL    RDW 15.4 (H) 11.6 - 15.1 %    MPV 9.5 8.9 - 12.7 fL    Platelets 255 734 - 628 Thousands/uL    nRBC 0 /100 WBCs    Neutrophils Relative 81 (H) 43 - 75 %    Immat GRANS % 0 0 - 2 %    Lymphocytes Relative 11 (L) 14 - 44 %    Monocytes Relative 7 4 - 12 %    Eosinophils Relative 1 0 - 6 %    Basophils Relative 0 0 - 1 %    Neutrophils Absolute 6.81 1.85 - 7.62 Thousands/µL    Immature Grans Absolute 0.02 0.00 - 0.20 Thousand/uL    Lymphocytes Absolute 0.95 0.60 - 4.47 Thousands/µL    Monocytes Absolute 0.60 0.17 - 1.22 Thousand/µL    Eosinophils Absolute 0.05 0.00 - 0.61 Thousand/µL    Basophils Absolute 0.01 0.00 - 0.10 Thousands/µL   Lactic acid, plasma (w/reflex if result > 2.0)    Collection Time: 09/01/23  9:57 PM   Result Value Ref Range    LACTIC ACID 1.3 0.5 - 2.0 mmol/L   Fingerstick Glucose (POCT)    Collection Time: 09/01/23 11:51 PM   Result Value Ref Range    POC Glucose 97 65 - 140 mg/dl   Fingerstick Glucose (POCT)    Collection Time: 09/02/23  8:28 AM   Result Value Ref Range    POC Glucose 140 65 - 140 mg/dl   Comprehensive metabolic panel    Collection Time: 09/02/23  8:37 AM   Result Value Ref Range    Sodium 139 135 - 147 mmol/L    Potassium 4.1 3.5 - 5.3 mmol/L    Chloride 109 (H) 96 - 108 mmol/L    CO2 25 21 - 32 mmol/L    ANION GAP 5 mmol/L    BUN 5 5 - 25 mg/dL    Creatinine 1.15 0.60 - 1.30 mg/dL    Glucose 131 65 - 140 mg/dL    Calcium 9.0 8.4 - 10.2 mg/dL    AST 16 13 - 39 U/L    ALT 9 7 - 52 U/L    Alkaline Phosphatase 51 34 - 104 U/L    Total Protein 6.8 6.4 - 8.4 g/dL    Albumin 3.5 3.5 - 5.0 g/dL    Total Bilirubin 0.51 0.20 - 1.00 mg/dL    eGFR 61 ml/min/1.73sq m   Magnesium    Collection Time: 09/02/23  8:37 AM   Result Value Ref Range    Magnesium 1.5 (L) 1.9 - 2.7 mg/dL   Phosphorus    Collection Time: 09/02/23  8:37 AM   Result Value Ref Range Phosphorus 3.3 2.3 - 4.1 mg/dL   CBC and differential    Collection Time: 09/02/23  8:37 AM   Result Value Ref Range    WBC 5.84 4.31 - 10.16 Thousand/uL    RBC 3.04 (L) 3.88 - 5.62 Million/uL    Hemoglobin 9.4 (L) 12.0 - 17.0 g/dL    Hematocrit 28.1 (L) 36.5 - 49.3 %    MCV 92 82 - 98 fL    MCH 30.9 26.8 - 34.3 pg    MCHC 33.5 31.4 - 37.4 g/dL    RDW 15.4 (H) 11.6 - 15.1 %    MPV 8.9 8.9 - 12.7 fL    Platelets 446 133 - 890 Thousands/uL   Manual Differential(PHLEBS Do Not Order)    Collection Time: 09/02/23  8:37 AM   Result Value Ref Range    Segmented % 70 43 - 75 %    Lymphocytes % 22 14 - 44 %    Monocytes % 6 4 - 12 %    Eosinophils, % 2 0 - 6 %    Basophils % 0 0 - 1 %    Absolute Neutrophils 4.09 1.85 - 7.62 Thousand/uL    Lymphocytes Absolute 1.28 0.60 - 4.47 Thousand/uL    Monocytes Absolute 0.35 0.00 - 1.22 Thousand/uL    Eosinophils Absolute 0.12 0.00 - 0.40 Thousand/uL    Basophils Absolute 0.00 0.00 - 0.10 Thousand/uL    Total Counted      Platelet Estimate Adequate Adequate    Hypochromia 1+     Polychromasia 1+      Imaging: I have personally reviewed pertinent reports. No results found. EKG, Pathology, and Other Studies: I have personally reviewed pertinent reports.

## 2023-09-03 VITALS
DIASTOLIC BLOOD PRESSURE: 83 MMHG | RESPIRATION RATE: 18 BRPM | OXYGEN SATURATION: 97 % | HEART RATE: 83 BPM | SYSTOLIC BLOOD PRESSURE: 172 MMHG | TEMPERATURE: 97.9 F

## 2023-09-03 PROBLEM — K35.80 ACUTE APPENDICITIS: Status: RESOLVED | Noted: 2023-08-30 | Resolved: 2023-09-03

## 2023-09-03 LAB
ANION GAP SERPL CALCULATED.3IONS-SCNC: 5 MMOL/L
BUN SERPL-MCNC: 7 MG/DL (ref 5–25)
CALCIUM SERPL-MCNC: 8.4 MG/DL (ref 8.4–10.2)
CHLORIDE SERPL-SCNC: 109 MMOL/L (ref 96–108)
CO2 SERPL-SCNC: 26 MMOL/L (ref 21–32)
CREAT SERPL-MCNC: 1.21 MG/DL (ref 0.6–1.3)
ERYTHROCYTE [DISTWIDTH] IN BLOOD BY AUTOMATED COUNT: 15.3 % (ref 11.6–15.1)
GFR SERPL CREATININE-BSD FRML MDRD: 57 ML/MIN/1.73SQ M
GLUCOSE SERPL-MCNC: 180 MG/DL (ref 65–140)
GLUCOSE SERPL-MCNC: 186 MG/DL (ref 65–140)
HCT VFR BLD AUTO: 24.8 % (ref 36.5–49.3)
HGB BLD-MCNC: 8.3 G/DL (ref 12–17)
MCH RBC QN AUTO: 31.2 PG (ref 26.8–34.3)
MCHC RBC AUTO-ENTMCNC: 33.5 G/DL (ref 31.4–37.4)
MCV RBC AUTO: 93 FL (ref 82–98)
PLATELET # BLD AUTO: 338 THOUSANDS/UL (ref 149–390)
PMV BLD AUTO: 9.2 FL (ref 8.9–12.7)
POTASSIUM SERPL-SCNC: 3.7 MMOL/L (ref 3.5–5.3)
RBC # BLD AUTO: 2.66 MILLION/UL (ref 3.88–5.62)
SODIUM SERPL-SCNC: 140 MMOL/L (ref 135–147)
WBC # BLD AUTO: 5.14 THOUSAND/UL (ref 4.31–10.16)

## 2023-09-03 PROCEDURE — 99024 POSTOP FOLLOW-UP VISIT: CPT | Performed by: STUDENT IN AN ORGANIZED HEALTH CARE EDUCATION/TRAINING PROGRAM

## 2023-09-03 PROCEDURE — 85027 COMPLETE CBC AUTOMATED: CPT

## 2023-09-03 PROCEDURE — 80048 BASIC METABOLIC PNL TOTAL CA: CPT

## 2023-09-03 PROCEDURE — 82948 REAGENT STRIP/BLOOD GLUCOSE: CPT

## 2023-09-03 RX ORDER — DOCUSATE SODIUM 100 MG/1
100 CAPSULE, LIQUID FILLED ORAL 2 TIMES DAILY
Qty: 60 CAPSULE | Refills: 0 | Status: SHIPPED | OUTPATIENT
Start: 2023-09-03 | End: 2023-10-03

## 2023-09-03 RX ORDER — HYDROCODONE BITARTRATE AND ACETAMINOPHEN 5; 325 MG/1; MG/1
1 TABLET ORAL EVERY 6 HOURS PRN
Qty: 10 TABLET | Refills: 0 | Status: SHIPPED | OUTPATIENT
Start: 2023-09-03 | End: 2023-09-13

## 2023-09-03 RX ORDER — AMOXICILLIN AND CLAVULANATE POTASSIUM 875; 125 MG/1; MG/1
1 TABLET, FILM COATED ORAL EVERY 12 HOURS SCHEDULED
Qty: 4 TABLET | Refills: 0 | Status: SHIPPED | OUTPATIENT
Start: 2023-09-03 | End: 2023-09-05

## 2023-09-03 RX ADMIN — OXYCODONE HYDROCHLORIDE 5 MG: 5 TABLET ORAL at 05:01

## 2023-09-03 RX ADMIN — AMPICILLIN SODIUM AND SULBACTAM SODIUM 3 G: 100; 50 INJECTION, POWDER, FOR SOLUTION INTRAVENOUS at 05:02

## 2023-09-03 RX ADMIN — HEPARIN SODIUM 5000 UNITS: 5000 INJECTION INTRAVENOUS; SUBCUTANEOUS at 05:02

## 2023-09-03 NOTE — PLAN OF CARE
Problem: PAIN - ADULT  Goal: Verbalizes/displays adequate comfort level or baseline comfort level  Description: Interventions:  - Encourage patient to monitor pain and request assistance  - Assess pain using appropriate pain scale  - Administer analgesics based on type and severity of pain and evaluate response  - Implement non-pharmacological measures as appropriate and evaluate response  - Consider cultural and social influences on pain and pain management  - Notify physician/advanced practitioner if interventions unsuccessful or patient reports new pain  Outcome: Adequate for Discharge     Problem: INFECTION - ADULT  Goal: Absence or prevention of progression during hospitalization  Description: INTERVENTIONS:  - Assess and monitor for signs and symptoms of infection  - Monitor lab/diagnostic results  - Monitor all insertion sites, i.e. indwelling lines, tubes, and drains  - Monitor endotracheal if appropriate and nasal secretions for changes in amount and color  - [place] appropriate cooling/warming therapies per order  - Administer medications as ordered  - Instruct and encourage patient and family to use good hand hygiene technique  - Identify and instruct in appropriate isolation precautions for identified infection/condition  Outcome: Adequate for Discharge  Goal: Absence of fever/infection during neutropenic period  Description: INTERVENTIONS:  - Monitor WBC    Outcome: Adequate for Discharge     Problem: SAFETY ADULT  Goal: Patient will remain free of falls  Description: INTERVENTIONS:  - Educate patient/family on patient safety including physical limitations  - Instruct patient to call for assistance with activity   - Consult OT/PT to assist with strengthening/mobility   - Keep Call bell within reach  - Keep bed low and locked with side rails adjusted as appropriate  - Keep care items and personal belongings within reach  - Initiate and maintain comfort rounds  - Make Fall Risk Sign visible to staff  - Apply yellow socks and bracelet for high fall risk patients  - Consider moving patient to room near nurses station  Outcome: Adequate for Discharge  Goal: Maintain or return to baseline ADL function  Description: INTERVENTIONS:  -  Assess patient's ability to carry out ADLs; assess patient's baseline for ADL function and identify physical deficits which impact ability to perform ADLs (bathing, care of mouth/teeth, toileting, grooming, dressing, etc.)  - Assess/evaluate cause of self-care deficits   - Assess range of motion  - Assess patient's mobility; develop plan if impaired  - Assess patient's need for assistive devices and provide as appropriate  - Encourage maximum independence but intervene and supervise when necessary  - Involve family in performance of ADLs  - Assess for home care needs following discharge   - Consider OT consult to assist with ADL evaluation and planning for discharge  - Provide patient education as appropriate  Outcome: Adequate for Discharge  Goal: Maintains/Returns to pre admission functional level  Description: INTERVENTIONS:  - Perform BMAT or MOVE assessment daily.   - Set and communicate daily mobility goal to care team and patient/family/caregiver.    - Collaborate with rehabilitation services on mobility goals if consulted  - Out of bed for toileting  - Record patient progress and toleration of activity level   Outcome: Adequate for Discharge     Problem: DISCHARGE PLANNING  Goal: Discharge to home or other facility with appropriate resources  Description: INTERVENTIONS:  - Identify barriers to discharge w/patient and caregiver  - Arrange for needed discharge resources and transportation as appropriate  - Identify discharge learning needs (meds, wound care, etc.)  - Arrange for interpretive services to assist at discharge as needed  - Refer to Case Management Department for coordinating discharge planning if the patient needs post-hospital services based on physician/advanced practitioner order or complex needs related to functional status, cognitive ability, or social support system  Outcome: Adequate for Discharge     Problem: Knowledge Deficit  Goal: Patient/family/caregiver demonstrates understanding of disease process, treatment plan, medications, and discharge instructions  Description: Complete learning assessment and assess knowledge base.   Interventions:  - Provide teaching at level of understanding  - Provide teaching via preferred learning methods  Outcome: Adequate for Discharge

## 2023-09-03 NOTE — DISCHARGE SUMMARY
Discharge Summary - Uday Quiroz 68 y.o. male MRN: [id number]    Unit/Bed#: -02 Encounter: [id number]    Admission Date:   Admission Orders (From admission, onward)     Ordered        09/01/23 2205  INPATIENT ADMISSION  Once                        Admitting Diagnosis: Mixed hyperlipidemia [E78.2]  Alcoholism ([address]) [F10.20]  Anxiety [F41.9]  Essential hypertension [I10]  Abdominal pain [R10.9]  Smoking [F17.200]  Status post appendectomy [Z90.49]  Other cirrhosis of liver ([address]) [K74.69]  Coronary artery disease involving native coronary artery of native heart without angina pectoris [I25.10]  Type 2 diabetes mellitus with diabetic polyneuropathy, without long-term current use of insulin ([address]) [E11.42]  Depression, unspecified depression type [F32. A]    HPI: Benitez Santana is a 66-year-old male who presented with right lower quadrant abdominal pain was diagnosed with acute appendicitis so laparoscopic appendectomy was performed. Today, he denies abdominal pain, nausea, vomiting. He is tolerating regular diet and having normal bowel function. He has no complaints at this time. The patient denies CP, SOB, palpitations, headache, fever, chills, unintentional weight loss, night sweats, nausea, vomiting, constipation, diarrhea. Procedures Performed: No orders of the defined types were placed in this encounter. Summary of Hospital Course: Benitez Santana is a 66-year-old male who presented on 8/30/2023 due to right lower quadrant abdominal pain. CTAP demonstrated acute appendicitis with perforation so laparoscopic appendectomy and TONNY drain placement was performed. The procedure went as planned without complications. The patient recovered well postoperatively and once bowel function returned his diet was slowly advanced. The TONNY drain was monitored daily and removed prior to discharge.   The patient remained on IV antibiotics for the duration of his admission and was discharged on oral equivalents to complete a 7-day antibiotic regimen. Vital signs have remained stable throughout the admission and laboratory work is within normal limits. Tolerating a regular diet and voiding spontaneously. Bowel function present. Ambulating without difficulty. Pain is well controlled on oral medication. We discussed outpatient follow-up with surgery in 2 weeks. The patient was educated on the diagnosis and treatment plan and all questions were adequately answered. The patient was deemed appropriate for discharge in the care of family on 9/3/2023. Significant Findings, Care, Treatment and Services Provided: Laparoscopic appendectomy    Complications: N/A    Discharge Diagnosis: Acute perforated appendicitis status post laparoscopic appendectomy    Medical Problems     Resolved Problems  Date Reviewed: 9/2/2023   None         Condition at Discharge: stable         Discharge instructions/Information to patient and family:   See after visit summary for information provided to patient and family. Provisions for Follow-Up Care:  See after visit summary for information related to follow-up care and any pertinent home health orders. PCP: No primary care provider on file. Disposition: Home    Planned Readmission: No      Discharge Statement   I spent 30 minutes discharging the patient. This time was spent on the day of discharge. I had direct contact with the patient on the day of discharge. Additional documentation is required if more than 30 minutes were spent on discharge. Discharge Medications:  See after visit summary for reconciled discharge medications provided to patient and family.

## 2023-09-03 NOTE — PLAN OF CARE
Problem: INFECTION - ADULT  Goal: Absence or prevention of progression during hospitalization  Description: INTERVENTIONS:  - Assess and monitor for signs and symptoms of infection  - Monitor lab/diagnostic results  - Monitor all insertion sites, i.e. indwelling lines, tubes, and drains  - Monitor endotracheal if appropriate and nasal secretions for changes in amount and color  - [place] appropriate cooling/warming therapies per order  - Administer medications as ordered  - Instruct and encourage patient and family to use good hand hygiene technique  - Identify and instruct in appropriate isolation precautions for identified infection/condition  Outcome: Progressing

## 2023-09-03 NOTE — DISCHARGE INSTR - AVS FIRST PAGE
Follow up: Following discharge from the hospital call the office in 1-2 days to set up a post operative appointment to be seen in 2 weeks. [phone]  Call the office if you have increased pain not relieved with pain medicine. Call the office if you have a fever,redness, the wound opens up, you have pus draining from your incision. AFTER YOU LEAVE: Following discharge from the hospital, you may have some questions about your procedure, your activities or your general condition. These instructions may answer some of your questions and help you adjust during the first few days following your operation. You can expect to be sore and tender mostly around the incisions. This pain should last approximally 5 days and gradually improve daily. Incisions: You may apply ice to the incisions to help with pain. It is normal to have some bruising, swelling or mild discoloration around the incision. If increasing redness or pain develops, call our office immediately. Do not apply any creams, lotions, or ointments. If you have dressings: You may remove the gauze dressing from your incisions 48 hours after surgery. Underneath this dressing is a tape like dressing called Steri Strips. Leave the steri strips in place for 5-7 days. They may fall off on their own. This is okay. Bathing: You may shower daily with soap and water the day after the procedure. It is OK to GENTLY wash the incision with soap and water then pat dry. DO NOT SCRUB. Do NOT soak incision in a tub, pool, or hot tub for 2 weeks. Diet:   Resume your normal diet unless specified otherwise. We recommend you slowly advance your diet. Try to start with softer bland foods and gradually advance as tolerated. Be sure to consume plenty of water. Avoid alcohol. Activity/Restrictions: The evening following the procedure you should rest as much as possible, sitting, lying or reclining.   you should be sure someone remains with you until the next morning. Gradually increase your activity daily. Walking 3-4 times daily is good and stairs are ok. Listen to your body. If you start to get tired or sore then rest.   No strenuous activity or exercise for 3-4 weeks. No heavy lifting, pushing or pulling. No driving for 5 days or while taking narcotics for pain. Return or work: You may return to work or other activities as soon as your pain is controlled and you feel comfortable. For many people, this is 5 to 7 days after surgery. If your job requires heavy lifting you will need to be on light duty for 2-3 weeks. Medication:   If you were given a prescription for Percocet, Norco, or Vicodin for pain be sure to eat prior to taking as these medications as they may cause nausea and vomiting on an empty stomach. If you do not want to take stronger medications for pain, you may take Tylenol, Aleve OR Ibuprofen  DO NOT take Tylenol  (acetaminophen) with these medication for a fever or for further pain control as these medications already contain Tylenol in them. Take one or the other. Do not exceed more than 4000 mg of acetaminophen in 24 hours or 3000 mg if you have liver disease. If you were given an antibiotic take it until it is finished. Constipation:   Patients often experience constipation after surgery. You may take a stool softener (Colace) to help prevent constipation. If you experience significant nausea or vomiting after abdominal surgery, call the office before trying any stronger medications or laxatives  Call the office if you haven't had a Bowel movement in 2-3days after surgery    Contact your healthcare provider if:   You have a fever over 101°F (38°C) or chills. You have pain or nausea that is not relieved by medicine. You have redness and swelling around your incisions, or blood or pus is leaking from your incisions. You are constipated or have diarrhea.     Your skin or eyes are yellow, or your bowel movements are pale. You have questions or concerns about your surgery, condition, or care. Seek care immediately or call 911 if:   You cannot stop vomiting. Your bowel movements are black or bloody. You have pain in your abdomen and it is swollen or hard. Your arm or leg feels warm, tender, and painful. It may look swollen and red. You feel lightheaded, short of breath, and have chest pain. You cough up blood.

## 2023-09-05 PROCEDURE — 88304 TISSUE EXAM BY PATHOLOGIST: CPT | Performed by: STUDENT IN AN ORGANIZED HEALTH CARE EDUCATION/TRAINING PROGRAM

## 2023-09-12 NOTE — DISCHARGE SUMMARY
Discharge Summary - Jovanna Ashton 68 y.o. male MRN: [id number]    Unit/Bed#: -02 Encounter: [id number]        Admitting Diagnosis: Mixed hyperlipidemia [E78.2]  Alcoholism ([address]) [F10.20]  Essential hypertension [I10]  Acute appendicitis [K35.80]  Abnormal CT scan [R93.89]  Acute appendicitis, unspecified acute appendicitis type [K35.80]  Coronary artery disease involving native coronary artery of native heart without angina pectoris [I25.10]  Coronary artery disease involving native heart without angina pectoris, unspecified vessel or lesion type [I25.10]  Type 2 diabetes mellitus with diabetic polyneuropathy, without long-term current use of insulin (HCC) [E11.42]  Depression, unspecified depression type [F32. A]    HPI: Admission Date: 8/29/2023 as per Admit Note by Kathie BARKER   Jovanna Ashton is a 68y.o. year old male with PMHx of alcoholism, CAD on Plavix and ASA, type 2 diabetes mellitus, and depression. He presented to the emergency department yesterday after being called with a read from a previous CT. He was told that it there was concern for acute appendicitis on his CAT scan from his previous admission 2 weeks ago. Patient at that time was admitted with abdominal pain, nausea and vomiting. 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 Patient reports drinking large amounts daily in the past.  He denies any current alcohol intake since his discharge on 8/20/2023. He denies any abdominal pain. He denies any fevers or chills at home. He has been eating without difficulty and denies any nausea or vomiting. He denies any further abdominal   Assessment/Plan   67y M w Abnormal finding of appendix concerning for acute appendicitis  - AVSS, no leukocytosis  CAD on ASA 81mg, and Plavix  HTN  HLD  Cirrhosis of the liver  Type 2 Diabetes Mellitus  Depression     Recently admitted for alcoholic induced gastritis.   Had presented with abdominal pain, nausea and vomiting. EGD showed gastritis. Patient symptoms improved and he was discharged. Presented to the emergency department with call with concern for acute appendicitis not initially read on initial CT scan. Patient reports not drinking since discharge. Denies any abdominal pain. Last dose of Plavix yesterday  Afebrile vital signs stable, no leukocytosis     Seen by cardiology and deemed moderate risk for surgical intervention    Procedures Performed: Procedure(s):  [facility] Course: the patient was admitted with the above plan. He was taken to the OR for Laparoscopic Appendectomy for necrotic and in the midportion which had an abscess. A TONNY drain was placed. He continued on antibiotics; he was started on a clear liquid diet. Per Cardiology antiplatelet therapy was resumed POD 1 as well as his other home meds. POD 2 the patient denied bowel movement and clear liquid diet continued. His leukocytosis had resolved. He signed out AMA with the TONNY drain still in place. The TONNY drain will be tentatively planned to be  removed in the surgery office in 2 weeks. On the day of discharge: the patient was seen in morning rounds. VSS  Lungs clear  RRR  ABD: soft   Incisional tenderness which was appropriate   Dressing C/d/i.   TONNY drain with s/s drainage. No calf tenderness      Pt will follow up in 2 weeks     Discharge instructions were not given verbally and written.  Pt signed out AMA    Complications: none     Discharge Diagnosis: Mixed hyperlipidemia [E78.2]  Alcoholism ([address]) [F10.20]  Essential hypertension [I10]  Acute appendicitis [K35.80]  Abnormal CT scan [R93.89]  Acute appendicitis, unspecified acute appendicitis type [K35.80]  Coronary artery disease involving native coronary artery of native heart without angina pectoris [I25.10]  Coronary artery disease involving native heart without angina pectoris, unspecified vessel or lesion type [I25.10]  Type 2 diabetes mellitus with diabetic polyneuropathy, without long-term current use of insulin (HCC) [E11.42]  Depression, unspecified depression type [F32. A]    Condition at Discharge: fair. Pt was still post op and had not been fully advanced to a soft surgical diet, he was not having bowel function however he signed out [facility]    Discharge instructions/Information to patient and family:   No given       Provisions for Follow-Up Care:  See after visit summary for information related to follow-up care and any pertinent home health orders. Disposition: Left against medical advice    Planned Readmission: No    Discharge Statement   I spent 30  minutes evaluating the chart and writing the discharge summary. . I had direct contact with the patient on the day of discharge. Additional documentation is required if more than 30 minutes were spent on discharge. Discharge Medications:  See after visit summary for reconciled discharge medications provided to patient and family.         Murphy Grijalva

## 2023-09-21 RX ORDER — METOPROLOL SUCCINATE 25 MG/1
TABLET, EXTENDED RELEASE ORAL
COMMUNITY

## 2023-09-26 ENCOUNTER — OFFICE VISIT (OUTPATIENT)
Dept: SURGERY | Facility: CLINIC | Age: 78
End: 2023-09-26

## 2023-09-26 VITALS
SYSTOLIC BLOOD PRESSURE: 124 MMHG | WEIGHT: 149.6 LBS | RESPIRATION RATE: 18 BRPM | OXYGEN SATURATION: 100 % | BODY MASS INDEX: 24.93 KG/M2 | DIASTOLIC BLOOD PRESSURE: 70 MMHG | TEMPERATURE: 97.7 F | HEIGHT: 65 IN | HEART RATE: 70 BPM

## 2023-09-26 DIAGNOSIS — K35.80 ACUTE APPENDICITIS, UNSPECIFIED ACUTE APPENDICITIS TYPE: Primary | ICD-10-CM

## 2023-09-26 DIAGNOSIS — Z76.89 ENCOUNTER TO ESTABLISH CARE: ICD-10-CM

## 2023-09-26 PROCEDURE — 99024 POSTOP FOLLOW-UP VISIT: CPT | Performed by: STUDENT IN AN ORGANIZED HEALTH CARE EDUCATION/TRAINING PROGRAM

## 2023-09-26 NOTE — PROGRESS NOTES
Assessment/Plan:  79-year-old male status post laparoscopic appendectomy on 8/30/2023  -Patient is tolerating a diet and having normal bowel function  -Denies any abdominal pain  -Laparoscopic incisions well-healed without erythema induration or drainage  -Pathology reviewed  -Patient has no restrictions at this time  -Follow-up in office as needed  -Patient states he would like to establish care in the area, referral placed for [facility] practice    Pathology Results:  Final Diagnosis   A. Appendix, Appendectomy:   - Suppurative acute appendicitis and periappendicitis with mural abscess formation.   - Numerous bacterial forms noted. I reviewed the pathology report and discussed the findings with the patient and their accompanying family or caregiver, if present. All questions were answered to satisfaction and the patient along with family or caregiver, if present, voiced understanding. 1. Acute appendicitis, unspecified acute appendicitis type    2. Encounter to establish care  -     Ambulatory Referral to [facility]; Future               Subjective:      Patient ID: Constance Blackman is a 68 y.o. male. Triage Notes:    Patient is a 79-year-old male who presents to office for evaluation status post laparoscopic appendectomy. He is tolerating a diet well and having normal bowel function. He denies any abdominal pain. The following portions of the patient's history were reviewed and updated as appropriate: allergies, current medications, past family history, past medical history, past social history, past surgical history and problem list.    Review of Systems   Constitutional: Negative for chills, fatigue and fever. HENT: Negative for congestion, hearing loss, rhinorrhea and sore throat. Eyes: Negative for pain and discharge. Respiratory: Negative for cough, chest tightness and shortness of breath. Cardiovascular: Negative for chest pain and palpitations.    Gastrointestinal: Negative for abdominal pain, constipation, diarrhea, nausea and vomiting. Endocrine: Negative for cold intolerance and heat intolerance. Genitourinary: Negative for difficulty urinating and dysuria. Musculoskeletal: Negative for back pain and neck pain. Skin: Negative for color change and rash. Allergic/Immunologic: Negative for environmental allergies and food allergies. Neurological: Negative for seizures and headaches. Hematological: Negative for adenopathy. Does not bruise/bleed easily. Psychiatric/Behavioral: Negative for confusion and hallucinations. Objective:      /70 (BP Location: Right arm, Patient Position: Sitting, Cuff Size: Standard)   Pulse 70   Temp 97.7 °F (36.5 °C)   Resp 18   Ht 5' 5" (1.651 m)   Wt 67.9 kg (149 lb 9.6 oz)   SpO2 100%   BMI 24.89 kg/m²     Below is the patient's most recent value for Albumin, ALT, AST, BUN, Calcium, Chloride, Cholesterol, CO2, Creatinine, GFR, Glucose, HDL, Hematocrit, Hemoglobin, Hemoglobin A1C, LDL, Magnesium, Phosphorus, Platelets, Potassium, PSA, Sodium, Triglycerides, and WBC. Lab Results   Component Value Date    ALT 9 09/02/2023    AST 16 09/02/2023    BUN 7 09/03/2023    CALCIUM 8.4 09/03/2023     (H) 09/03/2023    CO2 26 09/03/2023    CREATININE 1.21 09/03/2023    HDL 31 (L) 10/18/2019    HCT 24.8 (L) 09/03/2023    HGB 8.3 (L) 09/03/2023    HGBA1C 7.9 (H) 08/31/2023    MG 1.5 (L) 09/02/2023    PHOS 3.3 09/02/2023     09/03/2023    K 3.7 09/03/2023    TRIG 141 10/18/2019    WBC 5.14 09/03/2023     Note: for a comprehensive list of the patient's lab results, access the Results Review activity. Physical Exam  Constitutional:       Appearance: Normal appearance. HENT:      Head: Normocephalic and atraumatic. Nose: Nose normal.   Eyes:      General: No scleral icterus. Conjunctiva/sclera: Conjunctivae normal.   Cardiovascular:      Rate and Rhythm: Normal rate.    Pulmonary:      Effort: Pulmonary effort is normal.   Abdominal:      General: There is no distension. Palpations: Abdomen is soft. Tenderness: There is no abdominal tenderness. Comments: Laparoscopic incisions well-healed without erythema induration or drainage   Musculoskeletal:         General: No signs of injury. Skin:     General: Skin is warm. Coloration: Skin is not jaundiced. Neurological:      General: No focal deficit present. Mental Status: He is alert and oriented to person, place, and time.    Psychiatric:         Mood and Affect: Mood normal.         Behavior: Behavior normal.

## (undated) DEVICE — NEPTUNE E-SEP SMOKE EVACUATION PENCIL, COATED, 70MM BLADE, PUSH BUTTON SWITCH: Brand: NEPTUNE E-SEP

## (undated) DEVICE — PAD GROUNDING ADULT

## (undated) DEVICE — ELECTRODE LAP L WIRE E-Z CLEAN 33CM -0100

## (undated) DEVICE — TUBE O2 SUPL CRUSH RESIS CONN SOUTHSIDE ONLY

## (undated) DEVICE — TUBING MEDI-VAC W MAXIGRIP CONNECTORS 1/4"X6'

## (undated) DEVICE — TUBING CANNULA SALTER LABS NASAL ADULT 7FT

## (undated) DEVICE — SYR LUER LOK 50CC

## (undated) DEVICE — DRSG GAUZE 4X4"

## (undated) DEVICE — SOL INJ NS 0.9% 500ML 1-PORT

## (undated) DEVICE — SOLIDIFIER CANN EXPRESS 3K

## (undated) DEVICE — CLAMP BX HOT RAD JAW 3

## (undated) DEVICE — TROCAR: Brand: KII® SLEEVE

## (undated) DEVICE — 3M™ STERI-STRIP™ REINFORCED ADHESIVE SKIN CLOSURES, R1542, 1/4 IN X 1-1/2 IN (6 MM X 38 MM), 6 STRIPS/ENVELOPE: Brand: 3M™ STERI-STRIP™

## (undated) DEVICE — IRRIG ENDO FLO TUBING

## (undated) DEVICE — TISSUE RETRIEVAL SYSTEM: Brand: INZII RETRIEVAL SYSTEM

## (undated) DEVICE — CHLORAPREP HI-LITE 26ML ORANGE

## (undated) DEVICE — DRAPE EQUIPMENT RF WAND

## (undated) DEVICE — SNARE LOOP POLY DISP 30MM LOOP

## (undated) DEVICE — INTENDED FOR TISSUE SEPARATION, AND OTHER PROCEDURES THAT REQUIRE A SHARP SURGICAL BLADE TO PUNCTURE OR CUT.: Brand: BARD-PARKER SAFETY BLADES SIZE 15, STERILE

## (undated) DEVICE — LIGHT HANDLE COVER SLEEVE DISP BLUE STELLAR

## (undated) DEVICE — [HIGH FLOW INSUFFLATOR,  DO NOT USE IF PACKAGE IS DAMAGED,  KEEP DRY,  KEEP AWAY FROM SUNLIGHT,  PROTECT FROM HEAT AND RADIOACTIVE SOURCES.]: Brand: PNEUMOSURE

## (undated) DEVICE — SCD SEQUENTIAL COMPRESSION COMFORT SLEEVE MEDIUM KNEE LENGTH: Brand: KENDALL SCD

## (undated) DEVICE — 3M™ TEGADERM™ TRANSPARENT FILM DRESSING FRAME STYLE, 1624W, 2-3/8 IN X 2-3/4 IN (6 CM X 7 CM), 100/CT 4CT/CASE: Brand: 3M™ TEGADERM™

## (undated) DEVICE — TUBING IV SET GRAVITY 3Y 100" MACRO

## (undated) DEVICE — KIT ENDO PROCEDURE CUST W/VLV

## (undated) DEVICE — FORCEP RADIAL JAW 4 W NDL 2.2MM 2.8MM 240CM ORANGE DISP

## (undated) DEVICE — BITE BLOCK SCOPE SAVER 20X27MM ADULT GREEN

## (undated) DEVICE — RETRIEVER ROTH NET PLATINUM-UNIVERSAL

## (undated) DEVICE — GLOVE SRG BIOGEL 7

## (undated) DEVICE — TROCAR: Brand: KII FIOS FIRST ENTRY

## (undated) DEVICE — ENDOPATH ETS-FLEX45 ARTICULATING ENDOSCOPIC LINEAR CUTTER, NO RELOAD: Brand: ENDOPATH

## (undated) DEVICE — ENDOPATH ETS45 2.5MM RELOADS (VASCULAR/THIN): Brand: ENDOPATH

## (undated) DEVICE — 4-PORT MANIFOLD: Brand: NEPTUNE 2

## (undated) DEVICE — CATH ELCTR GLIDE PRB 7FR

## (undated) DEVICE — SENSOR O2 FINGER ADULT 24/CA

## (undated) DEVICE — FORMALIN CUPS 10% BUFFERED

## (undated) DEVICE — ADAPTER ENDO CHNL SINGLE USE

## (undated) DEVICE — WRAP COMPRESSION CALF MED

## (undated) DEVICE — MASK OXYGEN PANORAMIC

## (undated) DEVICE — NDL INJ SCLERO INTERJECT 23G

## (undated) DEVICE — SUT VICRYL 0 UR-6 27 IN J603H

## (undated) DEVICE — LUBE JELLY FOILPACK 36GM STERILE

## (undated) DEVICE — ETS45 RELOAD STANDARD 45MM: Brand: ENDOPATH

## (undated) DEVICE — GAUZE SPONGES,8 PLY: Brand: CURITY

## (undated) DEVICE — FORCEP BIOPSY 2.5MM DISP

## (undated) DEVICE — VALVE ENDOSCOPE DEFENDO SINGLE USE

## (undated) DEVICE — ALLENTOWN LAP CHOLE APP PACK: Brand: CARDINAL HEALTH

## (undated) DEVICE — SUT MONOCRYL 4-0 PS-2 18 IN Y496G

## (undated) DEVICE — 3M™ STERI-STRIP™ REINFORCED ADHESIVE SKIN CLOSURES, R1541, 1/4 IN X 3 IN (6 MM X 75 MM), 3 STRIPS/ENVELOPE: Brand: 3M™ STERI-STRIP™

## (undated) DEVICE — TUBING SMOKE EVAC W/FILTRATION DEVICE PLUMEPORT ACTIV

## (undated) DEVICE — GLOVE INDICATOR PI UNDERGLOVE SZ 7 BLUE